# Patient Record
Sex: FEMALE | Race: WHITE | Employment: OTHER | URBAN - METROPOLITAN AREA
[De-identification: names, ages, dates, MRNs, and addresses within clinical notes are randomized per-mention and may not be internally consistent; named-entity substitution may affect disease eponyms.]

---

## 2017-11-28 PROBLEM — M25.561 CHRONIC PAIN OF BOTH KNEES: Status: ACTIVE | Noted: 2017-11-28

## 2017-11-28 PROBLEM — Z00.00 PHYSICAL EXAM: Status: ACTIVE | Noted: 2017-11-28

## 2017-11-28 PROBLEM — G89.29 CHRONIC PAIN OF BOTH KNEES: Status: ACTIVE | Noted: 2017-11-28

## 2017-11-28 PROBLEM — R53.83 FATIGUE: Status: ACTIVE | Noted: 2017-11-28

## 2017-11-28 PROBLEM — M25.562 CHRONIC PAIN OF BOTH KNEES: Status: ACTIVE | Noted: 2017-11-28

## 2017-11-28 PROBLEM — E78.2 MIXED HYPERLIPIDEMIA: Status: ACTIVE | Noted: 2017-11-28

## 2017-12-12 PROBLEM — E55.9 VITAMIN D DEFICIENCY: Status: ACTIVE | Noted: 2017-12-12

## 2017-12-12 PROBLEM — N39.0 URINARY TRACT INFECTION WITH HEMATURIA: Status: ACTIVE | Noted: 2017-12-12

## 2017-12-12 PROBLEM — R31.9 URINARY TRACT INFECTION WITH HEMATURIA: Status: ACTIVE | Noted: 2017-12-12

## 2018-02-13 PROBLEM — Z12.39 SCREENING FOR BREAST CANCER: Status: ACTIVE | Noted: 2018-02-13

## 2018-03-22 ENCOUNTER — HOSPITAL ENCOUNTER (OUTPATIENT)
Dept: MAMMOGRAPHY | Age: 58
Discharge: HOME | End: 2018-03-24
Payer: MEDICAID

## 2018-03-22 DIAGNOSIS — Z12.39 SCREENING FOR BREAST CANCER: ICD-10-CM

## 2018-04-11 ENCOUNTER — OFFICE VISIT (OUTPATIENT)
Dept: ORTHOPEDIC SURGERY | Age: 58
End: 2018-04-11
Payer: MEDICAID

## 2018-04-11 VITALS — WEIGHT: 290 LBS | BODY MASS INDEX: 51.38 KG/M2 | HEIGHT: 63 IN

## 2018-04-11 DIAGNOSIS — M17.0 BILATERAL PRIMARY OSTEOARTHRITIS OF KNEE: Primary | ICD-10-CM

## 2018-04-11 DIAGNOSIS — E66.01 MORBID OBESITY (HCC): ICD-10-CM

## 2018-04-11 PROBLEM — Z12.39 SCREENING FOR BREAST CANCER: Status: RESOLVED | Noted: 2018-02-13 | Resolved: 2018-04-11

## 2018-04-11 PROCEDURE — 3017F COLORECTAL CA SCREEN DOC REV: CPT | Performed by: ORTHOPAEDIC SURGERY

## 2018-04-11 PROCEDURE — 20610 DRAIN/INJ JOINT/BURSA W/O US: CPT | Performed by: ORTHOPAEDIC SURGERY

## 2018-04-11 PROCEDURE — 1036F TOBACCO NON-USER: CPT | Performed by: ORTHOPAEDIC SURGERY

## 2018-04-11 PROCEDURE — G8417 CALC BMI ABV UP PARAM F/U: HCPCS | Performed by: ORTHOPAEDIC SURGERY

## 2018-04-11 PROCEDURE — 99214 OFFICE O/P EST MOD 30 MIN: CPT | Performed by: ORTHOPAEDIC SURGERY

## 2018-04-11 PROCEDURE — 3014F SCREEN MAMMO DOC REV: CPT | Performed by: ORTHOPAEDIC SURGERY

## 2018-04-11 PROCEDURE — G8427 DOCREV CUR MEDS BY ELIG CLIN: HCPCS | Performed by: ORTHOPAEDIC SURGERY

## 2018-04-11 RX ORDER — TRIAMCINOLONE ACETONIDE 40 MG/ML
40 INJECTION, SUSPENSION INTRA-ARTICULAR; INTRAMUSCULAR ONCE
Status: COMPLETED | OUTPATIENT
Start: 2018-04-11 | End: 2018-04-11

## 2018-04-11 RX ADMIN — TRIAMCINOLONE ACETONIDE 40 MG: 40 INJECTION, SUSPENSION INTRA-ARTICULAR; INTRAMUSCULAR at 15:45

## 2018-04-12 PROBLEM — Z00.00 PHYSICAL EXAM: Status: RESOLVED | Noted: 2017-11-28 | Resolved: 2018-04-12

## 2018-06-14 ENCOUNTER — TELEPHONE (OUTPATIENT)
Dept: ADMINISTRATIVE | Age: 58
End: 2018-06-14

## 2018-08-07 ENCOUNTER — TELEPHONE (OUTPATIENT)
Dept: ADMINISTRATIVE | Age: 58
End: 2018-08-07

## 2018-08-07 NOTE — TELEPHONE ENCOUNTER
Patient called in and is inquiring about wether or not her gel injections are approved. Hasn't heard and was last seen in April. She states he and  did discuss them. Please call her at [phone]. Thanks.

## 2018-08-09 ENCOUNTER — TELEPHONE (OUTPATIENT)
Dept: ORTHOPEDIC SURGERY | Age: 58
End: 2018-08-09

## 2018-08-16 ENCOUNTER — TELEPHONE (OUTPATIENT)
Dept: ORTHOPEDIC SURGERY | Age: 58
End: 2018-08-16

## 2018-08-16 NOTE — TELEPHONE ENCOUNTER
Left a message on voicemail in regards to Bilateral knee Euflexxa is approved and to call the office to schedule an appointment with

## 2018-09-06 ENCOUNTER — OFFICE VISIT (OUTPATIENT)
Dept: ORTHOPEDIC SURGERY | Age: 58
End: 2018-09-06
Payer: MEDICAID

## 2018-09-06 DIAGNOSIS — M17.0 BILATERAL PRIMARY OSTEOARTHRITIS OF KNEE: Primary | ICD-10-CM

## 2018-09-06 PROCEDURE — 20610 DRAIN/INJ JOINT/BURSA W/O US: CPT | Performed by: ORTHOPAEDIC SURGERY

## 2018-09-06 RX ORDER — HYALURONATE SODIUM 10 MG/ML
20 SYRINGE (ML) INTRAARTICULAR ONCE
Status: COMPLETED | OUTPATIENT
Start: 2018-09-06 | End: 2018-09-06

## 2018-09-06 RX ADMIN — Medication 20 MG: at 16:40

## 2018-09-14 ENCOUNTER — OFFICE VISIT (OUTPATIENT)
Dept: ORTHOPEDIC SURGERY | Age: 58
End: 2018-09-14
Payer: MEDICAID

## 2018-09-14 DIAGNOSIS — M17.0 BILATERAL PRIMARY OSTEOARTHRITIS OF KNEE: Primary | ICD-10-CM

## 2018-09-14 PROCEDURE — 20610 DRAIN/INJ JOINT/BURSA W/O US: CPT | Performed by: ORTHOPAEDIC SURGERY

## 2018-09-14 RX ORDER — HYALURONATE SODIUM 10 MG/ML
20 SYRINGE (ML) INTRAARTICULAR ONCE
Status: COMPLETED | OUTPATIENT
Start: 2018-09-14 | End: 2018-09-14

## 2018-09-14 RX ADMIN — Medication 20 MG: at 08:47

## 2018-10-12 DIAGNOSIS — E78.2 MIXED HYPERLIPIDEMIA: ICD-10-CM

## 2018-10-12 DIAGNOSIS — I10 ESSENTIAL HYPERTENSION: Primary | ICD-10-CM

## 2018-10-15 ENCOUNTER — HOSPITAL ENCOUNTER (OUTPATIENT)
Age: 58
Discharge: HOME | End: 2018-10-15
Payer: MEDICAID

## 2018-10-15 LAB
ALBUMIN SERPL-MCNC: 4.2 G/DL (ref 3.5–5.2)
ALP BLD-CCNC: 79 U/L (ref 35–104)
ALT SERPL-CCNC: 48 U/L (ref 0–32)
ANION GAP SERPL CALCULATED.3IONS-SCNC: 14 MMOL/L (ref 7–16)
ANISOCYTOSIS: ABNORMAL
AST SERPL-CCNC: 49 U/L (ref 0–31)
BASOPHILS ABSOLUTE: 0.15 E9/L (ref 0–0.2)
BASOPHILS RELATIVE PERCENT: 1.7 % (ref 0–2)
BILIRUB SERPL-MCNC: 0.4 MG/DL (ref 0–1.2)
BUN BLDV-MCNC: 20 MG/DL (ref 6–20)
CALCIUM SERPL-MCNC: 9.8 MG/DL (ref 8.6–10.2)
CHLORIDE BLD-SCNC: 103 MMOL/L (ref 98–107)
CHOLESTEROL, FASTING: 192 MG/DL (ref 0–199)
CO2: 24 MMOL/L (ref 22–29)
CREAT SERPL-MCNC: 0.8 MG/DL (ref 0.5–1)
EOSINOPHILS ABSOLUTE: 0.23 E9/L (ref 0.05–0.5)
EOSINOPHILS RELATIVE PERCENT: 2.6 % (ref 0–6)
GFR AFRICAN AMERICAN: >60
GFR NON-AFRICAN AMERICAN: >60 ML/MIN/1.73
GLUCOSE FASTING: 96 MG/DL (ref 74–109)
HBA1C MFR BLD: 5.5 % (ref 4–5.6)
HCT VFR BLD CALC: 41 % (ref 34–48)
HDLC SERPL-MCNC: 37 MG/DL
HEMOGLOBIN: 12.8 G/DL (ref 11.5–15.5)
LDL CHOLESTEROL CALCULATED: 106 MG/DL (ref 0–99)
LYMPHOCYTES ABSOLUTE: 2.07 E9/L (ref 1.5–4)
LYMPHOCYTES RELATIVE PERCENT: 22.6 % (ref 20–42)
MCH RBC QN AUTO: 27.2 PG (ref 26–35)
MCHC RBC AUTO-ENTMCNC: 31.2 % (ref 32–34.5)
MCV RBC AUTO: 87 FL (ref 80–99.9)
MONOCYTES ABSOLUTE: 0.45 E9/L (ref 0.1–0.95)
MONOCYTES RELATIVE PERCENT: 5.2 % (ref 2–12)
NEUTROPHILS ABSOLUTE: 6.12 E9/L (ref 1.8–7.3)
NEUTROPHILS RELATIVE PERCENT: 67.8 % (ref 43–80)
PDW BLD-RTO: 14.6 FL (ref 11.5–15)
PLATELET # BLD: 164 E9/L (ref 130–450)
PMV BLD AUTO: 11.4 FL (ref 7–12)
POLYCHROMASIA: ABNORMAL
POTASSIUM SERPL-SCNC: 4 MMOL/L (ref 3.5–5)
RBC # BLD: 4.71 E12/L (ref 3.5–5.5)
SODIUM BLD-SCNC: 141 MMOL/L (ref 132–146)
TOTAL PROTEIN: 7.7 G/DL (ref 6.4–8.3)
TRIGLYCERIDE, FASTING: 245 MG/DL (ref 0–149)
TSH SERPL DL<=0.05 MIU/L-ACNC: 3.52 UIU/ML (ref 0.27–4.2)
VLDLC SERPL CALC-MCNC: 49 MG/DL
WBC # BLD: 9 E9/L (ref 4.5–11.5)

## 2018-10-15 PROCEDURE — 36415 COLL VENOUS BLD VENIPUNCTURE: CPT

## 2018-10-15 PROCEDURE — 84443 ASSAY THYROID STIM HORMONE: CPT

## 2018-10-15 PROCEDURE — 83036 HEMOGLOBIN GLYCOSYLATED A1C: CPT

## 2018-10-15 PROCEDURE — 85025 COMPLETE CBC W/AUTO DIFF WBC: CPT

## 2018-10-15 PROCEDURE — 80061 LIPID PANEL: CPT

## 2018-10-15 PROCEDURE — 80053 COMPREHEN METABOLIC PANEL: CPT

## 2018-10-16 ENCOUNTER — HOSPITAL ENCOUNTER (OUTPATIENT)
Age: 58
Discharge: HOME | End: 2018-10-16
Payer: MEDICAID

## 2018-10-16 LAB
EKG ATRIAL RATE: 60 BPM
EKG P AXIS: 32 DEGREES
EKG P-R INTERVAL: 158 MS
EKG Q-T INTERVAL: 438 MS
EKG QRS DURATION: 96 MS
EKG QTC CALCULATION (BAZETT): 438 MS
EKG R AXIS: 29 DEGREES
EKG T AXIS: 37 DEGREES
EKG VENTRICULAR RATE: 60 BPM

## 2018-10-16 PROCEDURE — 93005 ELECTROCARDIOGRAM TRACING: CPT

## 2018-10-17 ENCOUNTER — OFFICE VISIT (OUTPATIENT)
Dept: FAMILY MEDICINE CLINIC | Age: 58
End: 2018-10-17
Payer: MEDICAID

## 2018-10-17 VITALS
WEIGHT: 290 LBS | DIASTOLIC BLOOD PRESSURE: 74 MMHG | HEART RATE: 76 BPM | OXYGEN SATURATION: 98 % | HEIGHT: 63 IN | SYSTOLIC BLOOD PRESSURE: 132 MMHG | BODY MASS INDEX: 51.38 KG/M2

## 2018-10-17 DIAGNOSIS — M17.11 OSTEOARTHRITIS OF RIGHT KNEE, UNSPECIFIED OSTEOARTHRITIS TYPE: ICD-10-CM

## 2018-10-17 DIAGNOSIS — F32.A DEPRESSION, UNSPECIFIED DEPRESSION TYPE: ICD-10-CM

## 2018-10-17 DIAGNOSIS — E78.2 MIXED HYPERLIPIDEMIA: ICD-10-CM

## 2018-10-17 DIAGNOSIS — M25.562 CHRONIC PAIN OF BOTH KNEES: ICD-10-CM

## 2018-10-17 DIAGNOSIS — G89.29 CHRONIC PAIN OF BOTH KNEES: ICD-10-CM

## 2018-10-17 DIAGNOSIS — Z23 IMMUNIZATION DUE: ICD-10-CM

## 2018-10-17 DIAGNOSIS — I10 ESSENTIAL HYPERTENSION: ICD-10-CM

## 2018-10-17 DIAGNOSIS — Z01.818 PRE-OP EXAM: Primary | ICD-10-CM

## 2018-10-17 DIAGNOSIS — M25.561 CHRONIC PAIN OF BOTH KNEES: ICD-10-CM

## 2018-10-17 PROCEDURE — 3017F COLORECTAL CA SCREEN DOC REV: CPT | Performed by: FAMILY MEDICINE

## 2018-10-17 PROCEDURE — 99214 OFFICE O/P EST MOD 30 MIN: CPT | Performed by: FAMILY MEDICINE

## 2018-10-17 PROCEDURE — 1036F TOBACCO NON-USER: CPT | Performed by: FAMILY MEDICINE

## 2018-10-17 PROCEDURE — G8417 CALC BMI ABV UP PARAM F/U: HCPCS | Performed by: FAMILY MEDICINE

## 2018-10-17 PROCEDURE — G8482 FLU IMMUNIZE ORDER/ADMIN: HCPCS | Performed by: FAMILY MEDICINE

## 2018-10-17 PROCEDURE — G8427 DOCREV CUR MEDS BY ELIG CLIN: HCPCS | Performed by: FAMILY MEDICINE

## 2018-10-17 RX ORDER — ALLOPURINOL 100 MG/1
200 TABLET ORAL
COMMUNITY
Start: 2018-06-19 | End: 2021-02-12 | Stop reason: ALTCHOICE

## 2018-10-17 RX ORDER — FERROUS SULFATE 325(65) MG
TABLET ORAL
COMMUNITY
Start: 2018-08-27 | End: 2019-05-01 | Stop reason: CLARIF

## 2018-10-17 NOTE — PATIENT INSTRUCTIONS
for heart disease, heart attack, and stroke. HDL is the \"good\" cholesterol. High HDL is linked with a lower risk for heart disease, heart attack, and stroke. Your cholesterol levels help your doctor find out your risk for having a heart attack or stroke. How can you prevent high cholesterol? A heart-healthy lifestyle can help you prevent high cholesterol. This lifestyle helps lower your risk for a heart attack and stroke. · Eat heart-healthy foods. ¨ Eat fruits, vegetables, whole grains (like oatmeal), dried beans and peas, nuts and seeds, soy products (like tofu), and fat-free or low-fat dairy products. ¨ Replace butter, margarine, and hydrogenated or partially hydrogenated oils with olive and canola oils. (Canola oil margarine without trans fat is fine.)  ¨ Replace red meat with fish, poultry, and soy protein (like tofu). ¨ Limit processed and packaged foods like chips, crackers, and cookies. · Be active. Exercise can improve your cholesterol level. Get at least 30 minutes of exercise on most days of the week. Walking is a good choice. You also may want to do other activities, such as running, swimming, cycling, or playing tennis or team sports. · Stay at a healthy weight. Lose weight if you need to. · Don't smoke. If you need help quitting, talk to your doctor about stop-smoking programs and medicines. These can increase your chances of quitting for good. How is high cholesterol treated? The goal of treatment is to reduce your chances of having a heart attack or stroke. The goal is not to lower your cholesterol numbers only. · You may make lifestyle changes, such as eating healthy foods, not smoking, losing weight, and being more active. · You may have to take medicine. Follow-up care is a key part of your treatment and safety. Be sure to make and go to all appointments, and call your doctor if you are having problems.  It's also a good idea to know your test results and keep a list of the medicines

## 2018-10-23 ASSESSMENT — ENCOUNTER SYMPTOMS
STRIDOR: 0
HEARTBURN: 0
EYE PAIN: 0
BLOOD IN STOOL: 0
SHORTNESS OF BREATH: 0
SINUS PAIN: 0
EYE DISCHARGE: 0
ABDOMINAL PAIN: 0
DIARRHEA: 0
BACK PAIN: 0
VOMITING: 0
WHEEZING: 0
SPUTUM PRODUCTION: 0
BLURRED VISION: 0
GASTROINTESTINAL NEGATIVE: 1
ORTHOPNEA: 0
HEMOPTYSIS: 0
NAUSEA: 0
EYES NEGATIVE: 1
PHOTOPHOBIA: 0
EYE REDNESS: 0
SORE THROAT: 0
CONSTIPATION: 0
COUGH: 0
DOUBLE VISION: 0
RESPIRATORY NEGATIVE: 1

## 2018-10-23 NOTE — PROGRESS NOTES
file as of 10/17/2018.         Return for after specialist.        Reviewed recent labs related to Sharla's current problems      Discussed importance of regular Health Maintenance follow up  Health Maintenance   Topic    Hepatitis C screen     HIV screen     Cervical cancer screen     Breast cancer screen     Shingles Vaccine (1 of 2 - 2 Dose Series)    Colon cancer screen colonoscopy     Flu vaccine (1)    Potassium monitoring     Creatinine monitoring     Diabetes screen     Lipid screen     DTaP/Tdap/Td vaccine (2 - Td)

## 2018-10-24 PROCEDURE — 90471 IMMUNIZATION ADMIN: CPT | Performed by: FAMILY MEDICINE

## 2018-10-24 PROCEDURE — 90686 IIV4 VACC NO PRSV 0.5 ML IM: CPT | Performed by: FAMILY MEDICINE

## 2018-11-19 DIAGNOSIS — I10 ESSENTIAL HYPERTENSION: ICD-10-CM

## 2018-11-19 RX ORDER — LISINOPRIL 40 MG/1
40 TABLET ORAL DAILY
Qty: 90 TABLET | Refills: 1 | Status: SHIPPED | OUTPATIENT
Start: 2018-11-19 | End: 2019-06-05 | Stop reason: SDUPTHER

## 2018-11-19 RX ORDER — METOPROLOL SUCCINATE 100 MG/1
100 TABLET, EXTENDED RELEASE ORAL DAILY
Qty: 90 TABLET | Refills: 1 | Status: SHIPPED | OUTPATIENT
Start: 2018-11-19 | End: 2019-06-05 | Stop reason: SDUPTHER

## 2019-02-11 DIAGNOSIS — F32.A DEPRESSION, UNSPECIFIED DEPRESSION TYPE: ICD-10-CM

## 2019-02-11 DIAGNOSIS — I10 ESSENTIAL HYPERTENSION: ICD-10-CM

## 2019-02-11 RX ORDER — CITALOPRAM 20 MG/1
20 TABLET ORAL DAILY
Qty: 30 TABLET | Refills: 1 | Status: SHIPPED | OUTPATIENT
Start: 2019-02-11 | End: 2019-02-11 | Stop reason: SDUPTHER

## 2019-02-11 RX ORDER — HYDROCHLOROTHIAZIDE 25 MG/1
25 TABLET ORAL DAILY
Qty: 30 TABLET | Refills: 1 | Status: SHIPPED | OUTPATIENT
Start: 2019-02-11 | End: 2019-04-23 | Stop reason: SDUPTHER

## 2019-02-11 RX ORDER — HYDROCHLOROTHIAZIDE 25 MG/1
25 TABLET ORAL DAILY
Qty: 30 TABLET | Refills: 1 | Status: SHIPPED | OUTPATIENT
Start: 2019-02-11 | End: 2019-02-11 | Stop reason: SDUPTHER

## 2019-02-11 RX ORDER — CITALOPRAM 20 MG/1
20 TABLET ORAL DAILY
Qty: 30 TABLET | Refills: 1 | Status: SHIPPED | OUTPATIENT
Start: 2019-02-11 | End: 2019-04-23 | Stop reason: SDUPTHER

## 2019-05-01 ENCOUNTER — OFFICE VISIT (OUTPATIENT)
Dept: FAMILY MEDICINE CLINIC | Age: 59
End: 2019-05-01
Payer: MEDICAID

## 2019-05-01 VITALS
HEIGHT: 63 IN | TEMPERATURE: 97.5 F | DIASTOLIC BLOOD PRESSURE: 78 MMHG | RESPIRATION RATE: 18 BRPM | SYSTOLIC BLOOD PRESSURE: 136 MMHG | BODY MASS INDEX: 48.73 KG/M2 | WEIGHT: 275 LBS | OXYGEN SATURATION: 94 % | HEART RATE: 61 BPM

## 2019-05-01 DIAGNOSIS — M17.11 OSTEOARTHRITIS OF RIGHT KNEE, UNSPECIFIED OSTEOARTHRITIS TYPE: ICD-10-CM

## 2019-05-01 DIAGNOSIS — M25.561 CHRONIC PAIN OF BOTH KNEES: ICD-10-CM

## 2019-05-01 DIAGNOSIS — M25.562 CHRONIC PAIN OF BOTH KNEES: ICD-10-CM

## 2019-05-01 DIAGNOSIS — G89.29 CHRONIC PAIN OF BOTH KNEES: ICD-10-CM

## 2019-05-01 DIAGNOSIS — Z01.818 PRE-OP EXAM: Primary | ICD-10-CM

## 2019-05-01 DIAGNOSIS — I10 ESSENTIAL HYPERTENSION: ICD-10-CM

## 2019-05-01 DIAGNOSIS — Z12.11 SCREENING FOR COLON CANCER: ICD-10-CM

## 2019-05-01 DIAGNOSIS — Z12.39 SCREENING FOR BREAST CANCER: ICD-10-CM

## 2019-05-01 PROCEDURE — 3017F COLORECTAL CA SCREEN DOC REV: CPT | Performed by: FAMILY MEDICINE

## 2019-05-01 PROCEDURE — G8427 DOCREV CUR MEDS BY ELIG CLIN: HCPCS | Performed by: FAMILY MEDICINE

## 2019-05-01 PROCEDURE — G8417 CALC BMI ABV UP PARAM F/U: HCPCS | Performed by: FAMILY MEDICINE

## 2019-05-01 PROCEDURE — 1036F TOBACCO NON-USER: CPT | Performed by: FAMILY MEDICINE

## 2019-05-01 PROCEDURE — 99214 OFFICE O/P EST MOD 30 MIN: CPT | Performed by: FAMILY MEDICINE

## 2019-05-01 RX ORDER — IBUPROFEN 800 MG/1
TABLET ORAL
Refills: 0 | COMMUNITY
Start: 2019-03-21 | End: 2020-01-15 | Stop reason: SDUPTHER

## 2019-05-02 ASSESSMENT — ENCOUNTER SYMPTOMS
BACK PAIN: 0
CONSTIPATION: 0
DIARRHEA: 0
ORTHOPNEA: 0
SORE THROAT: 0
EYE DISCHARGE: 0
SPUTUM PRODUCTION: 0
ABDOMINAL PAIN: 0
DOUBLE VISION: 0
BLOOD IN STOOL: 0
EYE REDNESS: 0
EYES NEGATIVE: 1
PHOTOPHOBIA: 0
EYE PAIN: 0
HEARTBURN: 0
HEMOPTYSIS: 0
WHEEZING: 0
RESPIRATORY NEGATIVE: 1
COUGH: 0
STRIDOR: 0
VOMITING: 0
SINUS PAIN: 0
GASTROINTESTINAL NEGATIVE: 1
BLURRED VISION: 0
NAUSEA: 0
SHORTNESS OF BREATH: 0

## 2019-05-03 NOTE — PROGRESS NOTES
Mckenzie Hamilton is a 59 y.o. female. HPI/Chief C/O:  Chief Complaint   Patient presents with    Pre-op Exam     Pt here today for pre-op exam for knee replacement 5/14     No Known Allergies  This 59year old female presents for pre-op. Pt has chronic pain pamela. Knees, to have total knee replacement right knee on 5/14/2019 by Dr. Kumar Giron. Pt has hypertension, and hyperlipidemia. Pt denies SI and HI.  ROS:  Review of Systems   Constitutional: Positive for malaise/fatigue. Negative for chills, diaphoresis, fever and weight loss. HENT: Negative. Negative for congestion, ear discharge, ear pain, hearing loss, nosebleeds, sinus pain, sore throat and tinnitus. Eyes: Negative. Negative for blurred vision, double vision, photophobia, pain, discharge and redness. Respiratory: Negative. Negative for cough, hemoptysis, sputum production, shortness of breath, wheezing and stridor. Cardiovascular: Negative for chest pain, palpitations, orthopnea, claudication, leg swelling and PND. Pt has hypertension, and hyperlipidemia. Gastrointestinal: Negative. Negative for abdominal pain, blood in stool, constipation, diarrhea, heartburn, melena, nausea and vomiting. Genitourinary: Negative. Negative for dysuria, flank pain, frequency, hematuria and urgency. Musculoskeletal: Positive for joint pain and myalgias. Negative for back pain, falls and neck pain. Skin: Negative. Negative for itching and rash. Neurological: Positive for tingling and focal weakness. Negative for dizziness, tremors, sensory change, speech change, seizures, loss of consciousness, weakness and headaches. Endo/Heme/Allergies: Negative. Negative for environmental allergies and polydipsia. Does not bruise/bleed easily. Psychiatric/Behavioral: Positive for depression. Negative for hallucinations, memory loss, substance abuse and suicidal ideas. The patient is nervous/anxious. The patient does not have insomnia. Kristopher Gordillo is a 59 y.o. female. HPI/Chief C/O:  Chief Complaint   Patient presents with    Pre-op Exam     Pt here today for pre-op exam for knee replacement 5/14     No Known Allergies  This 59year old female presents for pre-op. Pt has chronic pain pamela. Knees, to have total knee replacement right knee on 5/14/2019 by Dr. Gretchen Parham. Pt has hypertension, and hyperlipidemia. Pt denies SI and HI.  ROS:  Review of Systems   Constitutional: Positive for malaise/fatigue. Negative for chills, diaphoresis, fever and weight loss. HENT: Negative. Negative for congestion, ear discharge, ear pain, hearing loss, nosebleeds, sinus pain, sore throat and tinnitus. Eyes: Negative. Negative for blurred vision, double vision, photophobia, pain, discharge and redness. Respiratory: Negative. Negative for cough, hemoptysis, sputum production, shortness of breath, wheezing and stridor. Cardiovascular: Negative for chest pain, palpitations, orthopnea, claudication, leg swelling and PND. Pt has hypertension, and hyperlipidemia. Gastrointestinal: Negative. Negative for abdominal pain, blood in stool, constipation, diarrhea, heartburn, melena, nausea and vomiting. Genitourinary: Negative. Negative for dysuria, flank pain, frequency, hematuria and urgency. Musculoskeletal: Positive for joint pain and myalgias. Negative for back pain, falls and neck pain. Skin: Negative. Negative for itching and rash. Neurological: Positive for tingling and focal weakness. Negative for dizziness, tremors, sensory change, speech change, seizures, loss of consciousness, weakness and headaches. Endo/Heme/Allergies: Negative. Negative for environmental allergies and polydipsia. Does not bruise/bleed easily. Psychiatric/Behavioral: Positive for depression. Negative for hallucinations, memory loss, substance abuse and suicidal ideas. The patient is not nervous/anxious and does not have insomnia. Past Medical/Surgical Hx;  Reviewed with patient      Diagnosis Date    Hypertension      Past Surgical History:   Procedure Laterality Date    HYSTERECTOMY         Past Family Hx:  Reviewed with patient      Family history unknown: Yes       Social Hx:  Reviewed with patient  Social History     Tobacco Use    Smoking status: Never Smoker    Smokeless tobacco: Never Used   Substance Use Topics    Alcohol use: Yes       OBJECTIVE  /78   Pulse 61   Temp 97.5 °F (36.4 °C)   Resp 18   Ht 5' 2.99\" (1.6 m)   Wt 275 lb (124.7 kg)   SpO2 94%   Breastfeeding? No   BMI 48.73 kg/m²     Problem List:  Sharla does not have any pertinent problems on file. PHYS EX:  Physical Exam   Constitutional: She is oriented to person, place, and time. She appears well-developed and well-nourished. No distress. HENT:   Head: Normocephalic and atraumatic. Right Ear: External ear normal.   Left Ear: External ear normal.   Nose: Nose normal.   Mouth/Throat: Oropharynx is clear and moist. No oropharyngeal exudate. Eyes: Pupils are equal, round, and reactive to light. Conjunctivae and EOM are normal. Right eye exhibits no discharge. Left eye exhibits no discharge. No scleral icterus. Neck: Normal range of motion. Neck supple. No JVD present. No tracheal deviation present. No thyromegaly present. Cardiovascular: Normal rate, regular rhythm, normal heart sounds and intact distal pulses. Exam reveals no gallop and no friction rub. No murmur heard. Pulmonary/Chest: Effort normal and breath sounds normal. No stridor. No respiratory distress. She has no wheezes. She has no rales. She exhibits no tenderness. Abdominal: Soft. Bowel sounds are normal. She exhibits no distension and no mass. There is no tenderness. There is no rebound and no guarding. No hernia. Musculoskeletal: She exhibits tenderness. She exhibits no edema or deformity. Pain and decreased ROM pamela. Knees.    Lymphadenopathy:     She has no cervical adenopathy. Neurological: She is alert and oriented to person, place, and time. She has normal reflexes. She displays normal reflexes. No cranial nerve deficit. She exhibits normal muscle tone. Coordination normal.   Skin: Skin is warm. No rash noted. She is not diaphoretic. No erythema. No pallor. Psychiatric: She has a normal mood and affect. Her behavior is normal. Judgment and thought content normal.   Nursing note and vitals reviewed. ASSESSMENT/PLAN  Sharla was seen today for knee pain. Diagnoses and all orders for this visit:    Pre-op exam  Pre-op exam. Lab, EKG. Chronic pain of both knees  Not controlled. Pt states specialist will go over medication. Mixed hyperlipidemia  Not controlled. Low chol. Diet. Osteoarthritis of right knee, unspecified osteoarthritis type  Not controlled. Specialist.  Essential hypertension  Controlled. HCTZ, ace, BB, low salt diet. Depression. Controlled. Celexa. Pt instructed if any worse go ED ASAP. Outpatient Encounter Medications as of 5/1/2019   Medication Sig Dispense Refill    citalopram (CELEXA) 20 MG tablet Take 1 tablet by mouth daily 90 tablet 0    hydrochlorothiazide (HYDRODIURIL) 25 MG tablet Take 1 tablet by mouth daily 90 tablet 0    lisinopril (PRINIVIL;ZESTRIL) 40 MG tablet Take 1 tablet by mouth daily 90 tablet 1    metoprolol succinate (TOPROL XL) 100 MG extended release tablet Take 1 tablet by mouth daily 90 tablet 1    allopurinol (ZYLOPRIM) 100 MG tablet Take 100 mg by mouth      vitamin D (ERGOCALCIFEROL) 87213 units CAPS capsule Take 1 capsule by mouth once a week 4 capsule 3    ibuprofen (ADVIL;MOTRIN) 800 MG tablet take 1 tablet by mouth three times a day with food  0    [DISCONTINUED] meloxicam (MOBIC) 15 MG tablet take 1 tablet by mouth once daily with food 30 tablet 2    [DISCONTINUED] ferrous sulfate 325 (65 Fe) MG tablet        No facility-administered encounter medications on file as of 5/1/2019. Return for recheck after surgery. Reviewed recent labs related to Shrala's current problems      Discussed importance of regular Health Maintenance follow up  Health Maintenance   Topic    Hepatitis C screen     HIV screen     Cervical cancer screen     Breast cancer screen     Shingles Vaccine (1 of 2)    Colon cancer screen colonoscopy     Potassium monitoring     Creatinine monitoring     Diabetes screen     Lipid screen     DTaP/Tdap/Td vaccine (2 - Td)    Flu vaccine     Pneumococcal 0-64 years Vaccine                                              SUBJECTIVE  Sharla Mir is a 59 y.o. female. HPI/Chief C/O:  Chief Complaint   Patient presents with    Pre-op Exam     Pt here today for pre-op exam for knee replacement 5/14     No Known Allergies  This 59year old female presents for pre-op. Pt has chronic pain pamela. Knees, to have total knee replacement right knee on 5/14/2019 by Dr, Sheryle Poke. Pt has hypertension, and hyperlipidemia. Pt denies SI and HI.  ROS:  Review of Systems   Constitutional: Positive for malaise/fatigue. Negative for chills, diaphoresis, fever and weight loss. HENT: Negative. Negative for congestion, ear discharge, ear pain, hearing loss, nosebleeds, sinus pain, sore throat and tinnitus. Eyes: Negative. Negative for blurred vision, double vision, photophobia, pain, discharge and redness. Respiratory: Negative. Negative for cough, hemoptysis, sputum production, shortness of breath, wheezing and stridor. Cardiovascular: Negative for chest pain, palpitations, orthopnea, claudication, leg swelling and PND. Pt has hypertension, and hyperlipidemia. Gastrointestinal: Negative. Negative for abdominal pain, blood in stool, constipation, diarrhea, heartburn, melena, nausea and vomiting. Genitourinary: Negative. Negative for dysuria, flank pain, frequency, hematuria and urgency. Musculoskeletal: Positive for joint pain and myalgias.  Negative for release tablet Take 1 tablet by mouth daily 90 tablet 1    allopurinol (ZYLOPRIM) 100 MG tablet Take 100 mg by mouth      vitamin D (ERGOCALCIFEROL) 04819 units CAPS capsule Take 1 capsule by mouth once a week 4 capsule 3    ibuprofen (ADVIL;MOTRIN) 800 MG tablet take 1 tablet by mouth three times a day with food  0    [DISCONTINUED] meloxicam (MOBIC) 15 MG tablet take 1 tablet by mouth once daily with food 30 tablet 2    [DISCONTINUED] ferrous sulfate 325 (65 Fe) MG tablet        No facility-administered encounter medications on file as of 5/1/2019. Return for recheck after surgery. Reviewed recent labs related to Sharla's current problems      Discussed importance of regular Health Maintenance follow up  Health Maintenance   Topic    Hepatitis C screen     HIV screen     Cervical cancer screen     Breast cancer screen     Shingles Vaccine (1 of 2)    Colon cancer screen colonoscopy     Potassium monitoring     Creatinine monitoring     Diabetes screen     Lipid screen     DTaP/Tdap/Td vaccine (2 - Td)    Flu vaccine     Pneumococcal 0-64 years Vaccine                                                    Past Medical/Surgical Hx;  Reviewed with patient      Diagnosis Date    Hypertension      Past Surgical History:   Procedure Laterality Date    HYSTERECTOMY         Past Family Hx:  Reviewed with patient      Family history unknown: Yes       Social Hx:  Reviewed with patient  Social History     Tobacco Use    Smoking status: Never Smoker    Smokeless tobacco: Never Used   Substance Use Topics    Alcohol use: Yes       OBJECTIVE  /78   Pulse 61   Temp 97.5 °F (36.4 °C)   Resp 18   Ht 5' 2.99\" (1.6 m)   Wt 275 lb (124.7 kg)   SpO2 94%   Breastfeeding? No   BMI 48.73 kg/m²     Problem List:  Sharla does not have any pertinent problems on file. PHYS EX:  Physical Exam   Constitutional: She is oriented to person, place, and time.  She appears well-developed and well-nourished. No distress. HENT:   Head: Normocephalic and atraumatic. Right Ear: External ear normal.   Left Ear: External ear normal.   Nose: Nose normal.   Mouth/Throat: Oropharynx is clear and moist. No oropharyngeal exudate. Eyes: Pupils are equal, round, and reactive to light. Conjunctivae and EOM are normal. Right eye exhibits no discharge. Left eye exhibits no discharge. No scleral icterus. Neck: Normal range of motion. Neck supple. No JVD present. No tracheal deviation present. No thyromegaly present. Cardiovascular: Normal rate, regular rhythm, normal heart sounds and intact distal pulses. Exam reveals no gallop and no friction rub. No murmur heard. Pulmonary/Chest: Effort normal and breath sounds normal. No stridor. No respiratory distress. She has no wheezes. She has no rales. She exhibits no tenderness. Abdominal: Soft. Bowel sounds are normal. She exhibits no distension and no mass. There is no tenderness. There is no rebound and no guarding. No hernia. Musculoskeletal: She exhibits tenderness. She exhibits no edema or deformity. Pain and decreased ROM pamela. Knees. Pt in wheelchair. Lymphadenopathy:     She has no cervical adenopathy. Neurological: She is alert and oriented to person, place, and time. She has normal reflexes. She displays normal reflexes. No cranial nerve deficit. She exhibits normal muscle tone. Coordination normal.   Skin: Skin is warm. No rash noted. She is not diaphoretic. No erythema. No pallor. Psychiatric: She has a normal mood and affect. Her behavior is normal. Judgment and thought content normal.   Nursing note and vitals reviewed. ASSESSMENT/PLAN  Sharla was seen today for knee pain. Diagnoses and all orders for this visit:    Pre-op exam  Pre-op exam. Lab, EKG. Chronic pain of both knees  Not controlled. Pt states specialist will go over medication. Mixed hyperlipidemia  Not controlled. Low chol. Diet.   Osteoarthritis of right knee, unspecified osteoarthritis type  Not controlled. Specialist.  Essential hypertension  Controlled. HCTZ, ace, BB, low salt diet. Depression. Controlled. Celexa. Pt instructed if any worse go ED ASAP. Outpatient Encounter Medications as of 5/1/2019   Medication Sig Dispense Refill    citalopram (CELEXA) 20 MG tablet Take 1 tablet by mouth daily 90 tablet 0    hydrochlorothiazide (HYDRODIURIL) 25 MG tablet Take 1 tablet by mouth daily 90 tablet 0    lisinopril (PRINIVIL;ZESTRIL) 40 MG tablet Take 1 tablet by mouth daily 90 tablet 1    metoprolol succinate (TOPROL XL) 100 MG extended release tablet Take 1 tablet by mouth daily 90 tablet 1    allopurinol (ZYLOPRIM) 100 MG tablet Take 100 mg by mouth      vitamin D (ERGOCALCIFEROL) 26063 units CAPS capsule Take 1 capsule by mouth once a week 4 capsule 3    ibuprofen (ADVIL;MOTRIN) 800 MG tablet take 1 tablet by mouth three times a day with food  0    [DISCONTINUED] meloxicam (MOBIC) 15 MG tablet take 1 tablet by mouth once daily with food 30 tablet 2    [DISCONTINUED] ferrous sulfate 325 (65 Fe) MG tablet        No facility-administered encounter medications on file as of 5/1/2019. Return for recheck after surgery. Reviewed recent labs related to Sharla's current problems      Discussed importance of regular Health Maintenance follow up  Health Maintenance   Topic    Hepatitis C screen     HIV screen     Cervical cancer screen     Breast cancer screen     Shingles Vaccine (1 of 2)    Colon cancer screen colonoscopy     Potassium monitoring     Creatinine monitoring     Diabetes screen     Lipid screen     DTaP/Tdap/Td vaccine (2 - Td)    Flu vaccine     Pneumococcal 0-64 years Vaccine                                                Review of Systems   Constitutional: Positive for malaise/fatigue. Negative for chills, diaphoresis, fever and weight loss. HENT: Negative.   Negative for congestion, ear discharge, ear pain, hearing loss, nosebleeds, sinus pain, sore throat and tinnitus. Eyes: Negative. Negative for blurred vision, double vision, photophobia, pain, discharge and redness. Respiratory: Negative. Negative for cough, hemoptysis, sputum production, shortness of breath, wheezing and stridor. Cardiovascular: Negative for chest pain, palpitations, orthopnea, claudication, leg swelling and PND. Pt has hypertension, and hyperlipidemia. Gastrointestinal: Negative. Negative for abdominal pain, blood in stool, constipation, diarrhea, heartburn, melena, nausea and vomiting. Endocrine: Negative for polydipsia. Genitourinary: Negative. Negative for dysuria, flank pain, frequency, hematuria and urgency. Musculoskeletal: Positive for joint pain and myalgias. Negative for back pain, falls and neck pain. Skin: Negative. Negative for itching and rash. Allergic/Immunologic: Negative for environmental allergies. Neurological: Positive for tingling and focal weakness. Negative for dizziness, tremors, sensory change, speech change, seizures, loss of consciousness, weakness and headaches. Hematological: Does not bruise/bleed easily. Psychiatric/Behavioral: Positive for depression. Negative for hallucinations, memory loss, substance abuse and suicidal ideas. The patient is nervous/anxious. The patient does not have insomnia.

## 2019-06-05 DIAGNOSIS — I10 ESSENTIAL HYPERTENSION: ICD-10-CM

## 2019-06-05 RX ORDER — METOPROLOL SUCCINATE 100 MG/1
TABLET, EXTENDED RELEASE ORAL
Qty: 90 TABLET | Refills: 1 | Status: SHIPPED | OUTPATIENT
Start: 2019-06-05 | End: 2019-12-26 | Stop reason: SDUPTHER

## 2019-06-05 RX ORDER — LISINOPRIL 40 MG/1
TABLET ORAL
Qty: 90 TABLET | Refills: 1 | Status: SHIPPED | OUTPATIENT
Start: 2019-06-05 | End: 2019-12-26 | Stop reason: SDUPTHER

## 2019-07-18 DIAGNOSIS — Z12.11 SCREENING FOR COLON CANCER: ICD-10-CM

## 2019-07-18 LAB
CONTROL: NORMAL
HEMOCCULT STL QL: POSITIVE

## 2019-07-18 PROCEDURE — 82274 ASSAY TEST FOR BLOOD FECAL: CPT | Performed by: FAMILY MEDICINE

## 2019-07-19 DIAGNOSIS — R19.5 POSITIVE FIT (FECAL IMMUNOCHEMICAL TEST): Primary | ICD-10-CM

## 2019-08-02 DIAGNOSIS — K62.5 BLOOD ON RECTAL EXAM: Primary | ICD-10-CM

## 2019-10-07 ENCOUNTER — OFFICE VISIT (OUTPATIENT)
Dept: FAMILY MEDICINE CLINIC | Age: 59
End: 2019-10-07
Payer: MEDICAID

## 2019-10-07 VITALS
BODY MASS INDEX: 53.92 KG/M2 | SYSTOLIC BLOOD PRESSURE: 122 MMHG | HEIGHT: 62 IN | DIASTOLIC BLOOD PRESSURE: 74 MMHG | OXYGEN SATURATION: 96 % | TEMPERATURE: 98.6 F | HEART RATE: 61 BPM | WEIGHT: 293 LBS | RESPIRATION RATE: 18 BRPM

## 2019-10-07 DIAGNOSIS — Z23 IMMUNIZATION DUE: ICD-10-CM

## 2019-10-07 DIAGNOSIS — R53.83 FATIGUE, UNSPECIFIED TYPE: ICD-10-CM

## 2019-10-07 DIAGNOSIS — N20.0 NEPHROLITHIASIS: ICD-10-CM

## 2019-10-07 DIAGNOSIS — R73.01 IFG (IMPAIRED FASTING GLUCOSE): ICD-10-CM

## 2019-10-07 DIAGNOSIS — R73.09 ELEVATED HEMOGLOBIN A1C: ICD-10-CM

## 2019-10-07 DIAGNOSIS — F32.A DEPRESSION, UNSPECIFIED DEPRESSION TYPE: ICD-10-CM

## 2019-10-07 DIAGNOSIS — E55.9 VITAMIN D DEFICIENCY: ICD-10-CM

## 2019-10-07 DIAGNOSIS — E78.2 MIXED HYPERLIPIDEMIA: ICD-10-CM

## 2019-10-07 DIAGNOSIS — I10 ESSENTIAL HYPERTENSION: Primary | ICD-10-CM

## 2019-10-07 LAB
CHP ED QC CHECK: NORMAL
GLUCOSE BLD-MCNC: 121 MG/DL
HBA1C MFR BLD: 5.9 %

## 2019-10-07 PROCEDURE — 82962 GLUCOSE BLOOD TEST: CPT | Performed by: FAMILY MEDICINE

## 2019-10-07 PROCEDURE — 83036 HEMOGLOBIN GLYCOSYLATED A1C: CPT | Performed by: FAMILY MEDICINE

## 2019-10-07 PROCEDURE — G8417 CALC BMI ABV UP PARAM F/U: HCPCS | Performed by: FAMILY MEDICINE

## 2019-10-07 PROCEDURE — 1036F TOBACCO NON-USER: CPT | Performed by: FAMILY MEDICINE

## 2019-10-07 PROCEDURE — G8482 FLU IMMUNIZE ORDER/ADMIN: HCPCS | Performed by: FAMILY MEDICINE

## 2019-10-07 PROCEDURE — 99214 OFFICE O/P EST MOD 30 MIN: CPT | Performed by: FAMILY MEDICINE

## 2019-10-07 PROCEDURE — 90686 IIV4 VACC NO PRSV 0.5 ML IM: CPT | Performed by: FAMILY MEDICINE

## 2019-10-07 PROCEDURE — 3017F COLORECTAL CA SCREEN DOC REV: CPT | Performed by: FAMILY MEDICINE

## 2019-10-07 PROCEDURE — 90471 IMMUNIZATION ADMIN: CPT | Performed by: FAMILY MEDICINE

## 2019-10-07 PROCEDURE — G8427 DOCREV CUR MEDS BY ELIG CLIN: HCPCS | Performed by: FAMILY MEDICINE

## 2019-10-07 RX ORDER — GLUCOSAMINE HCL/CHONDROITIN SU 500-400 MG
CAPSULE ORAL
Qty: 200 STRIP | Refills: 3 | Status: SHIPPED
Start: 2019-10-07 | End: 2021-02-12 | Stop reason: ALTCHOICE

## 2019-10-07 RX ORDER — BLOOD-GLUCOSE METER
KIT MISCELLANEOUS
Qty: 1 KIT | Refills: 0 | Status: SHIPPED
Start: 2019-10-07 | End: 2021-02-12 | Stop reason: ALTCHOICE

## 2019-10-07 RX ORDER — CITALOPRAM 20 MG/1
20 TABLET ORAL DAILY
Qty: 90 TABLET | Refills: 1 | Status: SHIPPED
Start: 2019-10-07 | End: 2020-04-11 | Stop reason: SDUPTHER

## 2019-10-07 RX ORDER — HYDROCHLOROTHIAZIDE 25 MG/1
25 TABLET ORAL DAILY
Qty: 90 TABLET | Refills: 1 | Status: SHIPPED
Start: 2019-10-07 | End: 2020-04-11 | Stop reason: SDUPTHER

## 2019-10-07 RX ORDER — LANCETS 30 GAUGE
EACH MISCELLANEOUS
Qty: 200 EACH | Refills: 3 | Status: SHIPPED
Start: 2019-10-07 | End: 2021-02-12 | Stop reason: ALTCHOICE

## 2019-10-07 RX ORDER — POTASSIUM CITRATE 10 MEQ/1
1 TABLET, EXTENDED RELEASE ORAL 2 TIMES DAILY
Refills: 0 | COMMUNITY
Start: 2019-09-20 | End: 2021-02-19

## 2019-10-08 ENCOUNTER — HOSPITAL ENCOUNTER (OUTPATIENT)
Age: 59
Discharge: HOME | End: 2019-10-10
Payer: MEDICAID

## 2019-10-08 DIAGNOSIS — R73.01 IFG (IMPAIRED FASTING GLUCOSE): ICD-10-CM

## 2019-10-08 DIAGNOSIS — E55.9 VITAMIN D DEFICIENCY: ICD-10-CM

## 2019-10-08 DIAGNOSIS — I10 ESSENTIAL HYPERTENSION: ICD-10-CM

## 2019-10-08 DIAGNOSIS — Z23 IMMUNIZATION DUE: ICD-10-CM

## 2019-10-08 DIAGNOSIS — E78.2 MIXED HYPERLIPIDEMIA: ICD-10-CM

## 2019-10-08 DIAGNOSIS — R53.83 FATIGUE, UNSPECIFIED TYPE: ICD-10-CM

## 2019-10-08 DIAGNOSIS — R73.09 ELEVATED HEMOGLOBIN A1C: ICD-10-CM

## 2019-10-08 LAB
ALBUMIN SERPL-MCNC: 4.5 G/DL (ref 3.5–5.2)
ALP BLD-CCNC: 105 U/L (ref 35–104)
ALT SERPL-CCNC: 61 U/L (ref 0–32)
ANION GAP SERPL CALCULATED.3IONS-SCNC: 17 MMOL/L (ref 7–16)
AST SERPL-CCNC: 87 U/L (ref 0–31)
BASOPHILS ABSOLUTE: 0.07 E9/L (ref 0–0.2)
BASOPHILS RELATIVE PERCENT: 0.9 % (ref 0–2)
BILIRUB SERPL-MCNC: 0.5 MG/DL (ref 0–1.2)
BUN BLDV-MCNC: 23 MG/DL (ref 8–23)
BURR CELLS: ABNORMAL
CALCIUM SERPL-MCNC: 9.9 MG/DL (ref 8.6–10.2)
CHLORIDE BLD-SCNC: 106 MMOL/L (ref 98–107)
CHOLESTEROL, TOTAL: 245 MG/DL (ref 0–199)
CO2: 23 MMOL/L (ref 22–29)
CREAT SERPL-MCNC: 1 MG/DL (ref 0.5–1)
CREATININE URINE: 503 MG/DL (ref 29–226)
EOSINOPHILS ABSOLUTE: 0.21 E9/L (ref 0.05–0.5)
EOSINOPHILS RELATIVE PERCENT: 2.6 % (ref 0–6)
GFR AFRICAN AMERICAN: >60
GFR NON-AFRICAN AMERICAN: 56 ML/MIN/1.73
GLUCOSE BLD-MCNC: 123 MG/DL (ref 74–99)
HCT VFR BLD CALC: 45.9 % (ref 34–48)
HDLC SERPL-MCNC: 33 MG/DL
HEMOGLOBIN: 13 G/DL (ref 11.5–15.5)
LDL CHOLESTEROL CALCULATED: 168 MG/DL (ref 0–99)
LYMPHOCYTES ABSOLUTE: 3.03 E9/L (ref 1.5–4)
LYMPHOCYTES RELATIVE PERCENT: 36.5 % (ref 20–42)
MCH RBC QN AUTO: 28.4 PG (ref 26–35)
MCHC RBC AUTO-ENTMCNC: 28.3 % (ref 32–34.5)
MCV RBC AUTO: 100.2 FL (ref 80–99.9)
METAMYELOCYTES RELATIVE PERCENT: 0.9 % (ref 0–1)
MICROALBUMIN UR-MCNC: 41.2 MG/L
MICROALBUMIN/CREAT UR-RTO: 8.2 (ref 0–30)
MONOCYTES ABSOLUTE: 0.41 E9/L (ref 0.1–0.95)
MONOCYTES RELATIVE PERCENT: 5.2 % (ref 2–12)
MYELOCYTE PERCENT: 0.9 % (ref 0–0)
NEUTROPHILS ABSOLUTE: 4.51 E9/L (ref 1.8–7.3)
NEUTROPHILS RELATIVE PERCENT: 53 % (ref 43–80)
PDW BLD-RTO: 16.6 FL (ref 11.5–15)
PLATELET # BLD: 194 E9/L (ref 130–450)
PMV BLD AUTO: 12.1 FL (ref 7–12)
POIKILOCYTES: ABNORMAL
POLYCHROMASIA: ABNORMAL
POTASSIUM SERPL-SCNC: 4.8 MMOL/L (ref 3.5–5)
RBC # BLD: 4.58 E12/L (ref 3.5–5.5)
SODIUM BLD-SCNC: 146 MMOL/L (ref 132–146)
TOTAL PROTEIN: 8.3 G/DL (ref 6.4–8.3)
TRIGL SERPL-MCNC: 221 MG/DL (ref 0–149)
TSH SERPL DL<=0.05 MIU/L-ACNC: 7.5 UIU/ML (ref 0.27–4.2)
VITAMIN D 25-HYDROXY: 16 NG/ML (ref 30–100)
VLDLC SERPL CALC-MCNC: 44 MG/DL
WBC # BLD: 8.2 E9/L (ref 4.5–11.5)

## 2019-10-08 PROCEDURE — 80061 LIPID PANEL: CPT

## 2019-10-08 PROCEDURE — 82570 ASSAY OF URINE CREATININE: CPT

## 2019-10-08 PROCEDURE — 82306 VITAMIN D 25 HYDROXY: CPT

## 2019-10-08 PROCEDURE — 85025 COMPLETE CBC W/AUTO DIFF WBC: CPT

## 2019-10-08 PROCEDURE — 84443 ASSAY THYROID STIM HORMONE: CPT

## 2019-10-08 PROCEDURE — 82044 UR ALBUMIN SEMIQUANTITATIVE: CPT

## 2019-10-08 PROCEDURE — 80053 COMPREHEN METABOLIC PANEL: CPT

## 2019-10-10 PROBLEM — R73.09 ELEVATED HEMOGLOBIN A1C: Status: ACTIVE | Noted: 2019-10-10

## 2019-10-10 ASSESSMENT — ENCOUNTER SYMPTOMS
BACK PAIN: 0
WHEEZING: 0
ANAL BLEEDING: 0
COUGH: 0
SINUS PRESSURE: 0
ABDOMINAL DISTENTION: 0
PHOTOPHOBIA: 0
CHOKING: 0
EYES NEGATIVE: 1
EYE DISCHARGE: 0
CONSTIPATION: 0
RESPIRATORY NEGATIVE: 1
FACIAL SWELLING: 0
COLOR CHANGE: 0
EYE ITCHING: 0
SINUS PAIN: 0
SHORTNESS OF BREATH: 0
CHEST TIGHTNESS: 0
VOICE CHANGE: 0
DIARRHEA: 0
EYE REDNESS: 0
NAUSEA: 0
EYE PAIN: 0
SORE THROAT: 0
BLOOD IN STOOL: 1
ABDOMINAL PAIN: 0
VOMITING: 0
TROUBLE SWALLOWING: 0
RHINORRHEA: 0
RECTAL PAIN: 0
ALLERGIC/IMMUNOLOGIC NEGATIVE: 1
STRIDOR: 0

## 2019-11-04 ENCOUNTER — TELEPHONE (OUTPATIENT)
Dept: FAMILY MEDICINE CLINIC | Age: 59
End: 2019-11-04

## 2019-12-05 ENCOUNTER — APPOINTMENT (OUTPATIENT)
Dept: GENERAL RADIOLOGY | Age: 59
End: 2019-12-05
Payer: MEDICAID

## 2019-12-05 ENCOUNTER — HOSPITAL ENCOUNTER (OUTPATIENT)
Age: 59
Setting detail: OBSERVATION
Discharge: HOME | End: 2019-12-07
Attending: EMERGENCY MEDICINE | Admitting: INTERNAL MEDICINE
Payer: MEDICAID

## 2019-12-05 ENCOUNTER — TELEPHONE (OUTPATIENT)
Dept: ADMINISTRATIVE | Age: 59
End: 2019-12-05

## 2019-12-05 DIAGNOSIS — R07.9 CHEST PAIN, UNSPECIFIED TYPE: ICD-10-CM

## 2019-12-05 DIAGNOSIS — R00.2 PALPITATIONS: Primary | ICD-10-CM

## 2019-12-05 LAB
ANION GAP SERPL CALCULATED.3IONS-SCNC: 17 MMOL/L (ref 7–16)
BASOPHILS ABSOLUTE: 0.03 E9/L (ref 0–0.2)
BASOPHILS RELATIVE PERCENT: 0.3 % (ref 0–2)
BUN BLDV-MCNC: 21 MG/DL (ref 8–23)
CALCIUM SERPL-MCNC: 9.8 MG/DL (ref 8.6–10.2)
CHLORIDE BLD-SCNC: 99 MMOL/L (ref 98–107)
CO2: 24 MMOL/L (ref 22–29)
CREAT SERPL-MCNC: 0.9 MG/DL (ref 0.5–1)
EOSINOPHILS ABSOLUTE: 0.13 E9/L (ref 0.05–0.5)
EOSINOPHILS RELATIVE PERCENT: 1.2 % (ref 0–6)
GFR AFRICAN AMERICAN: >60
GFR NON-AFRICAN AMERICAN: >60 ML/MIN/1.73
GLUCOSE BLD-MCNC: 127 MG/DL (ref 74–99)
HCT VFR BLD CALC: 41.8 % (ref 34–48)
HEMOGLOBIN: 13.3 G/DL (ref 11.5–15.5)
IMMATURE GRANULOCYTES #: 0.06 E9/L
IMMATURE GRANULOCYTES %: 0.6 % (ref 0–5)
LYMPHOCYTES ABSOLUTE: 2.6 E9/L (ref 1.5–4)
LYMPHOCYTES RELATIVE PERCENT: 24.9 % (ref 20–42)
MAGNESIUM: 2.2 MG/DL (ref 1.6–2.6)
MCH RBC QN AUTO: 28.6 PG (ref 26–35)
MCHC RBC AUTO-ENTMCNC: 31.8 % (ref 32–34.5)
MCV RBC AUTO: 89.9 FL (ref 80–99.9)
MONOCYTES ABSOLUTE: 0.79 E9/L (ref 0.1–0.95)
MONOCYTES RELATIVE PERCENT: 7.6 % (ref 2–12)
NEUTROPHILS ABSOLUTE: 6.85 E9/L (ref 1.8–7.3)
NEUTROPHILS RELATIVE PERCENT: 65.4 % (ref 43–80)
PDW BLD-RTO: 15.1 FL (ref 11.5–15)
PLATELET # BLD: 195 E9/L (ref 130–450)
PMV BLD AUTO: 10.9 FL (ref 7–12)
POTASSIUM SERPL-SCNC: 4.1 MMOL/L (ref 3.5–5)
RBC # BLD: 4.65 E12/L (ref 3.5–5.5)
SODIUM BLD-SCNC: 140 MMOL/L (ref 132–146)
TROPONIN: <0.01 NG/ML (ref 0–0.03)
TSH SERPL DL<=0.05 MIU/L-ACNC: 5.79 UIU/ML (ref 0.27–4.2)
WBC # BLD: 10.5 E9/L (ref 4.5–11.5)

## 2019-12-05 PROCEDURE — 6370000000 HC RX 637 (ALT 250 FOR IP): Performed by: INTERNAL MEDICINE

## 2019-12-05 PROCEDURE — G0378 HOSPITAL OBSERVATION PER HR: HCPCS

## 2019-12-05 PROCEDURE — 84484 ASSAY OF TROPONIN QUANT: CPT

## 2019-12-05 PROCEDURE — 80048 BASIC METABOLIC PNL TOTAL CA: CPT

## 2019-12-05 PROCEDURE — 84443 ASSAY THYROID STIM HORMONE: CPT

## 2019-12-05 PROCEDURE — 71045 X-RAY EXAM CHEST 1 VIEW: CPT

## 2019-12-05 PROCEDURE — 36415 COLL VENOUS BLD VENIPUNCTURE: CPT

## 2019-12-05 PROCEDURE — 99285 EMERGENCY DEPT VISIT HI MDM: CPT

## 2019-12-05 PROCEDURE — 83735 ASSAY OF MAGNESIUM: CPT

## 2019-12-05 PROCEDURE — 85025 COMPLETE CBC W/AUTO DIFF WBC: CPT

## 2019-12-05 PROCEDURE — 93005 ELECTROCARDIOGRAM TRACING: CPT | Performed by: STUDENT IN AN ORGANIZED HEALTH CARE EDUCATION/TRAINING PROGRAM

## 2019-12-05 PROCEDURE — 6370000000 HC RX 637 (ALT 250 FOR IP): Performed by: STUDENT IN AN ORGANIZED HEALTH CARE EDUCATION/TRAINING PROGRAM

## 2019-12-05 RX ORDER — IBUPROFEN 800 MG/1
800 TABLET ORAL 3 TIMES DAILY PRN
Status: DISCONTINUED | OUTPATIENT
Start: 2019-12-05 | End: 2019-12-07 | Stop reason: HOSPADM

## 2019-12-05 RX ORDER — ALLOPURINOL 100 MG/1
200 TABLET ORAL DAILY
Status: DISCONTINUED | OUTPATIENT
Start: 2019-12-06 | End: 2019-12-07 | Stop reason: HOSPADM

## 2019-12-05 RX ORDER — ASPIRIN 81 MG/1
324 TABLET, CHEWABLE ORAL ONCE
Status: COMPLETED | OUTPATIENT
Start: 2019-12-05 | End: 2019-12-05

## 2019-12-05 RX ORDER — LISINOPRIL 20 MG/1
40 TABLET ORAL DAILY
Status: DISCONTINUED | OUTPATIENT
Start: 2019-12-06 | End: 2019-12-07 | Stop reason: HOSPADM

## 2019-12-05 RX ORDER — POTASSIUM CITRATE 10 MEQ/1
10 TABLET, EXTENDED RELEASE ORAL 2 TIMES DAILY
Status: DISCONTINUED | OUTPATIENT
Start: 2019-12-05 | End: 2019-12-07 | Stop reason: HOSPADM

## 2019-12-05 RX ORDER — CITALOPRAM 20 MG/1
20 TABLET ORAL DAILY
Status: DISCONTINUED | OUTPATIENT
Start: 2019-12-06 | End: 2019-12-07 | Stop reason: HOSPADM

## 2019-12-05 RX ORDER — HYDROCHLOROTHIAZIDE 25 MG/1
25 TABLET ORAL DAILY
Status: DISCONTINUED | OUTPATIENT
Start: 2019-12-06 | End: 2019-12-07 | Stop reason: HOSPADM

## 2019-12-05 RX ORDER — METOPROLOL SUCCINATE 100 MG/1
100 TABLET, EXTENDED RELEASE ORAL DAILY
Status: DISCONTINUED | OUTPATIENT
Start: 2019-12-06 | End: 2019-12-07 | Stop reason: HOSPADM

## 2019-12-05 RX ADMIN — POTASSIUM CITRATE 10 MEQ: 10 TABLET ORAL at 23:15

## 2019-12-05 RX ADMIN — ASPIRIN 81 MG 324 MG: 81 TABLET ORAL at 20:45

## 2019-12-05 ASSESSMENT — ENCOUNTER SYMPTOMS
NAUSEA: 0
WHEEZING: 0
SINUS PRESSURE: 0
BACK PAIN: 0
COUGH: 0
DIARRHEA: 0
SORE THROAT: 0
EYE REDNESS: 0
ABDOMINAL DISTENTION: 0
SHORTNESS OF BREATH: 0
VOMITING: 0
EYE PAIN: 0
EYE DISCHARGE: 0

## 2019-12-05 ASSESSMENT — PAIN SCALES - GENERAL: PAINLEVEL_OUTOF10: 0

## 2019-12-06 LAB
ALBUMIN SERPL-MCNC: 3.9 G/DL (ref 3.5–5.2)
ALP BLD-CCNC: 99 U/L (ref 35–104)
ALT SERPL-CCNC: 83 U/L (ref 0–32)
ANION GAP SERPL CALCULATED.3IONS-SCNC: 13 MMOL/L (ref 7–16)
AST SERPL-CCNC: 110 U/L (ref 0–31)
BASOPHILS ABSOLUTE: 0.04 E9/L (ref 0–0.2)
BASOPHILS RELATIVE PERCENT: 0.4 % (ref 0–2)
BILIRUB SERPL-MCNC: 0.8 MG/DL (ref 0–1.2)
BUN BLDV-MCNC: 24 MG/DL (ref 8–23)
CALCIUM SERPL-MCNC: 9.6 MG/DL (ref 8.6–10.2)
CHLORIDE BLD-SCNC: 98 MMOL/L (ref 98–107)
CHOLESTEROL, TOTAL: 201 MG/DL (ref 0–199)
CK MB: 1.1 NG/ML (ref 0–4.3)
CK MB: 1.4 NG/ML (ref 0–4.3)
CK MB: <1 NG/ML (ref 0–4.3)
CO2: 26 MMOL/L (ref 22–29)
CREAT SERPL-MCNC: 0.9 MG/DL (ref 0.5–1)
EKG ATRIAL RATE: 53 BPM
EKG ATRIAL RATE: 67 BPM
EKG P AXIS: 36 DEGREES
EKG P AXIS: 62 DEGREES
EKG P-R INTERVAL: 164 MS
EKG P-R INTERVAL: 200 MS
EKG Q-T INTERVAL: 412 MS
EKG Q-T INTERVAL: 434 MS
EKG QRS DURATION: 88 MS
EKG QRS DURATION: 90 MS
EKG QTC CALCULATION (BAZETT): 407 MS
EKG QTC CALCULATION (BAZETT): 435 MS
EKG R AXIS: -6 DEGREES
EKG R AXIS: 88 DEGREES
EKG T AXIS: -9 DEGREES
EKG T AXIS: 18 DEGREES
EKG VENTRICULAR RATE: 53 BPM
EKG VENTRICULAR RATE: 67 BPM
EOSINOPHILS ABSOLUTE: 0.12 E9/L (ref 0.05–0.5)
EOSINOPHILS RELATIVE PERCENT: 1.3 % (ref 0–6)
GFR AFRICAN AMERICAN: >60
GFR NON-AFRICAN AMERICAN: >60 ML/MIN/1.73
GLUCOSE BLD-MCNC: 136 MG/DL (ref 74–99)
HBA1C MFR BLD: 6.1 % (ref 4–5.6)
HCT VFR BLD CALC: 39.7 % (ref 34–48)
HDLC SERPL-MCNC: 31 MG/DL
HEMOGLOBIN: 12.7 G/DL (ref 11.5–15.5)
IMMATURE GRANULOCYTES #: 0.05 E9/L
IMMATURE GRANULOCYTES %: 0.5 % (ref 0–5)
LDL CHOLESTEROL CALCULATED: 125 MG/DL (ref 0–99)
LV EF: 57 %
LVEF MODALITY: NORMAL
LYMPHOCYTES ABSOLUTE: 2.14 E9/L (ref 1.5–4)
LYMPHOCYTES RELATIVE PERCENT: 23.5 % (ref 20–42)
MAGNESIUM: 1.9 MG/DL (ref 1.6–2.6)
MCH RBC QN AUTO: 28.8 PG (ref 26–35)
MCHC RBC AUTO-ENTMCNC: 32 % (ref 32–34.5)
MCV RBC AUTO: 90 FL (ref 80–99.9)
MONOCYTES ABSOLUTE: 0.73 E9/L (ref 0.1–0.95)
MONOCYTES RELATIVE PERCENT: 8 % (ref 2–12)
NEUTROPHILS ABSOLUTE: 6.02 E9/L (ref 1.8–7.3)
NEUTROPHILS RELATIVE PERCENT: 66.3 % (ref 43–80)
PDW BLD-RTO: 15.2 FL (ref 11.5–15)
PHOSPHORUS: 4.5 MG/DL (ref 2.5–4.5)
PLATELET # BLD: 187 E9/L (ref 130–450)
PMV BLD AUTO: 11 FL (ref 7–12)
POTASSIUM SERPL-SCNC: 4 MMOL/L (ref 3.5–5)
RBC # BLD: 4.41 E12/L (ref 3.5–5.5)
SEDIMENTATION RATE, ERYTHROCYTE: 27 MM/HR (ref 0–20)
SODIUM BLD-SCNC: 137 MMOL/L (ref 132–146)
T4 FREE: 0.84 NG/DL (ref 0.93–1.7)
TOTAL CK: 46 U/L (ref 20–180)
TOTAL CK: 49 U/L (ref 20–180)
TOTAL CK: 56 U/L (ref 20–180)
TOTAL PROTEIN: 7.5 G/DL (ref 6.4–8.3)
TRIGL SERPL-MCNC: 223 MG/DL (ref 0–149)
TROPONIN: <0.01 NG/ML (ref 0–0.03)
VLDLC SERPL CALC-MCNC: 45 MG/DL
WBC # BLD: 9.1 E9/L (ref 4.5–11.5)

## 2019-12-06 PROCEDURE — G0378 HOSPITAL OBSERVATION PER HR: HCPCS

## 2019-12-06 PROCEDURE — 36415 COLL VENOUS BLD VENIPUNCTURE: CPT

## 2019-12-06 PROCEDURE — 84100 ASSAY OF PHOSPHORUS: CPT

## 2019-12-06 PROCEDURE — 6370000000 HC RX 637 (ALT 250 FOR IP): Performed by: INTERNAL MEDICINE

## 2019-12-06 PROCEDURE — C8929 TTE W OR WO FOL WCON,DOPPLER: HCPCS

## 2019-12-06 PROCEDURE — 80061 LIPID PANEL: CPT

## 2019-12-06 PROCEDURE — 83036 HEMOGLOBIN GLYCOSYLATED A1C: CPT

## 2019-12-06 PROCEDURE — 93005 ELECTROCARDIOGRAM TRACING: CPT | Performed by: INTERNAL MEDICINE

## 2019-12-06 PROCEDURE — 85025 COMPLETE CBC W/AUTO DIFF WBC: CPT

## 2019-12-06 PROCEDURE — 96372 THER/PROPH/DIAG INJ SC/IM: CPT

## 2019-12-06 PROCEDURE — 6360000004 HC RX CONTRAST MEDICATION: Performed by: INTERNAL MEDICINE

## 2019-12-06 PROCEDURE — 82550 ASSAY OF CK (CPK): CPT

## 2019-12-06 PROCEDURE — 84439 ASSAY OF FREE THYROXINE: CPT

## 2019-12-06 PROCEDURE — 80053 COMPREHEN METABOLIC PANEL: CPT

## 2019-12-06 PROCEDURE — 82553 CREATINE MB FRACTION: CPT

## 2019-12-06 PROCEDURE — 84484 ASSAY OF TROPONIN QUANT: CPT

## 2019-12-06 PROCEDURE — 83735 ASSAY OF MAGNESIUM: CPT

## 2019-12-06 PROCEDURE — 85651 RBC SED RATE NONAUTOMATED: CPT

## 2019-12-06 PROCEDURE — 6360000002 HC RX W HCPCS: Performed by: INTERNAL MEDICINE

## 2019-12-06 RX ORDER — LEVOTHYROXINE SODIUM 0.07 MG/1
75 TABLET ORAL DAILY
Status: DISCONTINUED | OUTPATIENT
Start: 2019-12-06 | End: 2019-12-07 | Stop reason: HOSPADM

## 2019-12-06 RX ORDER — ASPIRIN 81 MG/1
81 TABLET, CHEWABLE ORAL DAILY
Status: DISCONTINUED | OUTPATIENT
Start: 2019-12-06 | End: 2019-12-07 | Stop reason: HOSPADM

## 2019-12-06 RX ADMIN — HYDROCHLOROTHIAZIDE 25 MG: 25 TABLET ORAL at 09:35

## 2019-12-06 RX ADMIN — POTASSIUM CITRATE 10 MEQ: 10 TABLET ORAL at 19:50

## 2019-12-06 RX ADMIN — ENOXAPARIN SODIUM 40 MG: 40 INJECTION SUBCUTANEOUS at 09:36

## 2019-12-06 RX ADMIN — IBUPROFEN 800 MG: 800 TABLET, FILM COATED ORAL at 16:28

## 2019-12-06 RX ADMIN — PERFLUTREN 2.2 MG: 6.52 INJECTION, SUSPENSION INTRAVENOUS at 12:36

## 2019-12-06 RX ADMIN — ASPIRIN 81 MG 81 MG: 81 TABLET ORAL at 09:32

## 2019-12-06 RX ADMIN — LISINOPRIL 40 MG: 20 TABLET ORAL at 09:32

## 2019-12-06 RX ADMIN — CITALOPRAM HYDROBROMIDE 20 MG: 20 TABLET ORAL at 09:35

## 2019-12-06 RX ADMIN — ALLOPURINOL 200 MG: 100 TABLET ORAL at 09:38

## 2019-12-06 RX ADMIN — METOPROLOL SUCCINATE 100 MG: 100 TABLET, EXTENDED RELEASE ORAL at 09:32

## 2019-12-06 RX ADMIN — POTASSIUM CITRATE 10 MEQ: 10 TABLET ORAL at 09:32

## 2019-12-06 ASSESSMENT — PAIN SCALES - GENERAL
PAINLEVEL_OUTOF10: 2
PAINLEVEL_OUTOF10: 0
PAINLEVEL_OUTOF10: 0

## 2019-12-07 ENCOUNTER — APPOINTMENT (OUTPATIENT)
Dept: NUCLEAR MEDICINE | Age: 59
End: 2019-12-07
Payer: MEDICAID

## 2019-12-07 VITALS
RESPIRATION RATE: 18 BRPM | SYSTOLIC BLOOD PRESSURE: 151 MMHG | BODY MASS INDEX: 51.51 KG/M2 | DIASTOLIC BLOOD PRESSURE: 82 MMHG | HEART RATE: 63 BPM | TEMPERATURE: 97.8 F | OXYGEN SATURATION: 96 % | HEIGHT: 63 IN | WEIGHT: 290.7 LBS

## 2019-12-07 LAB
ANION GAP SERPL CALCULATED.3IONS-SCNC: 12 MMOL/L (ref 7–16)
BASOPHILS ABSOLUTE: 0.05 E9/L (ref 0–0.2)
BASOPHILS RELATIVE PERCENT: 0.5 % (ref 0–2)
BUN BLDV-MCNC: 25 MG/DL (ref 8–23)
CALCIUM SERPL-MCNC: 9.7 MG/DL (ref 8.6–10.2)
CHLORIDE BLD-SCNC: 99 MMOL/L (ref 98–107)
CO2: 29 MMOL/L (ref 22–29)
CREAT SERPL-MCNC: 1 MG/DL (ref 0.5–1)
EOSINOPHILS ABSOLUTE: 0.11 E9/L (ref 0.05–0.5)
EOSINOPHILS RELATIVE PERCENT: 1.2 % (ref 0–6)
GFR AFRICAN AMERICAN: >60
GFR NON-AFRICAN AMERICAN: 56 ML/MIN/1.73
GLUCOSE BLD-MCNC: 131 MG/DL (ref 74–99)
HCT VFR BLD CALC: 40 % (ref 34–48)
HEMOGLOBIN: 13.1 G/DL (ref 11.5–15.5)
IMMATURE GRANULOCYTES #: 0.06 E9/L
IMMATURE GRANULOCYTES %: 0.6 % (ref 0–5)
LV EF: 79 %
LVEF MODALITY: NORMAL
LYMPHOCYTES ABSOLUTE: 1.73 E9/L (ref 1.5–4)
LYMPHOCYTES RELATIVE PERCENT: 18.2 % (ref 20–42)
MCH RBC QN AUTO: 29.4 PG (ref 26–35)
MCHC RBC AUTO-ENTMCNC: 32.8 % (ref 32–34.5)
MCV RBC AUTO: 89.9 FL (ref 80–99.9)
MONOCYTES ABSOLUTE: 0.63 E9/L (ref 0.1–0.95)
MONOCYTES RELATIVE PERCENT: 6.6 % (ref 2–12)
NEUTROPHILS ABSOLUTE: 6.94 E9/L (ref 1.8–7.3)
NEUTROPHILS RELATIVE PERCENT: 72.9 % (ref 43–80)
PDW BLD-RTO: 14.9 FL (ref 11.5–15)
PLATELET # BLD: 184 E9/L (ref 130–450)
PMV BLD AUTO: 11.2 FL (ref 7–12)
POTASSIUM SERPL-SCNC: 4.1 MMOL/L (ref 3.5–5)
RBC # BLD: 4.45 E12/L (ref 3.5–5.5)
SODIUM BLD-SCNC: 140 MMOL/L (ref 132–146)
WBC # BLD: 9.5 E9/L (ref 4.5–11.5)

## 2019-12-07 PROCEDURE — 80048 BASIC METABOLIC PNL TOTAL CA: CPT

## 2019-12-07 PROCEDURE — 36415 COLL VENOUS BLD VENIPUNCTURE: CPT

## 2019-12-07 PROCEDURE — 6360000002 HC RX W HCPCS: Performed by: INTERNAL MEDICINE

## 2019-12-07 PROCEDURE — A9500 TC99M SESTAMIBI: HCPCS | Performed by: RADIOLOGY

## 2019-12-07 PROCEDURE — 85025 COMPLETE CBC W/AUTO DIFF WBC: CPT

## 2019-12-07 PROCEDURE — G0378 HOSPITAL OBSERVATION PER HR: HCPCS

## 2019-12-07 PROCEDURE — 3430000000 HC RX DIAGNOSTIC RADIOPHARMACEUTICAL: Performed by: RADIOLOGY

## 2019-12-07 PROCEDURE — 93017 CV STRESS TEST TRACING ONLY: CPT

## 2019-12-07 PROCEDURE — 78452 HT MUSCLE IMAGE SPECT MULT: CPT

## 2019-12-07 RX ORDER — LEVOTHYROXINE SODIUM 0.07 MG/1
75 TABLET ORAL DAILY
Qty: 30 TABLET | Refills: 3 | Status: SHIPPED | OUTPATIENT
Start: 2019-12-08 | End: 2020-04-13 | Stop reason: SDUPTHER

## 2019-12-07 RX ADMIN — Medication 30 MILLICURIE: at 11:24

## 2019-12-07 RX ADMIN — REGADENOSON 0.4 MG: 0.08 INJECTION, SOLUTION INTRAVENOUS at 10:10

## 2019-12-07 RX ADMIN — Medication 10 MILLICURIE: at 08:18

## 2019-12-07 ASSESSMENT — PAIN SCALES - GENERAL: PAINLEVEL_OUTOF10: 0

## 2020-01-13 ENCOUNTER — OFFICE VISIT (OUTPATIENT)
Dept: FAMILY MEDICINE CLINIC | Age: 60
End: 2020-01-13
Payer: MEDICAID

## 2020-01-13 ENCOUNTER — HOSPITAL ENCOUNTER (OUTPATIENT)
Age: 60
Discharge: HOME | End: 2020-01-15
Payer: MEDICAID

## 2020-01-13 VITALS
RESPIRATION RATE: 18 BRPM | TEMPERATURE: 98.7 F | HEART RATE: 64 BPM | BODY MASS INDEX: 51.21 KG/M2 | WEIGHT: 289 LBS | SYSTOLIC BLOOD PRESSURE: 124 MMHG | OXYGEN SATURATION: 94 % | DIASTOLIC BLOOD PRESSURE: 84 MMHG | HEIGHT: 63 IN

## 2020-01-13 LAB
CHP ED QC CHECK: NORMAL
GLUCOSE BLD-MCNC: 142 MG/DL
TSH SERPL DL<=0.05 MIU/L-ACNC: 2.98 UIU/ML (ref 0.27–4.2)

## 2020-01-13 PROCEDURE — 1036F TOBACCO NON-USER: CPT | Performed by: FAMILY MEDICINE

## 2020-01-13 PROCEDURE — 99214 OFFICE O/P EST MOD 30 MIN: CPT | Performed by: FAMILY MEDICINE

## 2020-01-13 PROCEDURE — G8482 FLU IMMUNIZE ORDER/ADMIN: HCPCS | Performed by: FAMILY MEDICINE

## 2020-01-13 PROCEDURE — 3017F COLORECTAL CA SCREEN DOC REV: CPT | Performed by: FAMILY MEDICINE

## 2020-01-13 PROCEDURE — G8417 CALC BMI ABV UP PARAM F/U: HCPCS | Performed by: FAMILY MEDICINE

## 2020-01-13 PROCEDURE — G8427 DOCREV CUR MEDS BY ELIG CLIN: HCPCS | Performed by: FAMILY MEDICINE

## 2020-01-13 PROCEDURE — 84443 ASSAY THYROID STIM HORMONE: CPT

## 2020-01-13 PROCEDURE — 80074 ACUTE HEPATITIS PANEL: CPT

## 2020-01-13 PROCEDURE — 82962 GLUCOSE BLOOD TEST: CPT | Performed by: FAMILY MEDICINE

## 2020-01-13 RX ORDER — IBUPROFEN 800 MG/1
TABLET ORAL
Qty: 120 TABLET | Refills: 0 | Status: CANCELLED | OUTPATIENT
Start: 2020-01-13

## 2020-01-13 RX ORDER — ERGOCALCIFEROL 1.25 MG/1
50000 CAPSULE ORAL WEEKLY
Qty: 4 CAPSULE | Refills: 3 | Status: SHIPPED
Start: 2020-01-13 | End: 2020-06-13 | Stop reason: SDUPTHER

## 2020-01-14 ENCOUNTER — HOSPITAL ENCOUNTER (OUTPATIENT)
Dept: ULTRASOUND IMAGING | Age: 60
Discharge: HOME | End: 2020-01-14
Payer: MEDICAID

## 2020-01-14 LAB
HAV IGM SER IA-ACNC: NORMAL
HEPATITIS B CORE IGM ANTIBODY: NORMAL
HEPATITIS B SURFACE ANTIGEN INTERPRETATION: NORMAL
HEPATITIS C ANTIBODY INTERPRETATION: NORMAL

## 2020-01-14 PROCEDURE — 76705 ECHO EXAM OF ABDOMEN: CPT

## 2020-01-15 NOTE — TELEPHONE ENCOUNTER
Pt called asking if IBU 800mg may be refilled. Please advise.     Electronically signed by Georges Raya MA on 1/15/20 at 4:01 PM

## 2020-01-16 RX ORDER — IBUPROFEN 800 MG/1
TABLET ORAL
Qty: 120 TABLET | Refills: 0 | Status: SHIPPED
Start: 2020-01-16 | End: 2020-04-11 | Stop reason: SDUPTHER

## 2020-01-17 ENCOUNTER — TELEPHONE (OUTPATIENT)
Dept: FAMILY MEDICINE CLINIC | Age: 60
End: 2020-01-17

## 2020-01-17 PROBLEM — E66.01 MORBID OBESITY (HCC): Status: ACTIVE | Noted: 2020-01-17

## 2020-01-17 PROBLEM — M19.90 OSTEOARTHRITIS: Status: ACTIVE | Noted: 2020-01-17

## 2020-01-17 PROBLEM — E03.9 ACQUIRED HYPOTHYROIDISM: Status: ACTIVE | Noted: 2020-01-17

## 2020-01-17 PROBLEM — R74.8 ELEVATED LIVER ENZYMES: Status: ACTIVE | Noted: 2020-01-17

## 2020-01-17 ASSESSMENT — ENCOUNTER SYMPTOMS
VOMITING: 0
RESPIRATORY NEGATIVE: 1
NAUSEA: 0
WHEEZING: 0
EYE ITCHING: 0
SINUS PRESSURE: 0
CHEST TIGHTNESS: 0
EYES NEGATIVE: 1
TROUBLE SWALLOWING: 0
ABDOMINAL PAIN: 0
SORE THROAT: 0
COLOR CHANGE: 0
EYE DISCHARGE: 0
BACK PAIN: 0
BLOOD IN STOOL: 1
FACIAL SWELLING: 0
ANAL BLEEDING: 0
DIARRHEA: 0
COUGH: 0
EYE REDNESS: 0
SINUS PAIN: 0
RHINORRHEA: 0
CONSTIPATION: 0
VOICE CHANGE: 0
RECTAL PAIN: 0
PHOTOPHOBIA: 0
EYE PAIN: 0
CHOKING: 0
ABDOMINAL DISTENTION: 0
ALLERGIC/IMMUNOLOGIC NEGATIVE: 1
STRIDOR: 0
SHORTNESS OF BREATH: 0

## 2020-01-17 NOTE — PROGRESS NOTES
diarrhea, nausea, rectal pain and vomiting. Endocrine: Negative. Negative for cold intolerance, heat intolerance, polydipsia, polyphagia and polyuria. Genitourinary: Positive for frequency and urgency. Negative for decreased urine volume, difficulty urinating, dysuria, flank pain, genital sores, hematuria, menstrual problem and pelvic pain. Musculoskeletal: Positive for arthralgias and myalgias. Negative for back pain, gait problem, joint swelling, neck pain and neck stiffness. Skin: Negative. Negative for color change, pallor, rash and wound. Allergic/Immunologic: Negative. Neurological: Negative. Negative for dizziness, tremors, seizures, syncope, facial asymmetry, speech difficulty, weakness, light-headedness, numbness and headaches. Hematological: Negative. Negative for adenopathy. Does not bruise/bleed easily. Psychiatric/Behavioral: Positive for sleep disturbance. Negative for agitation, behavioral problems, confusion, decreased concentration, dysphoric mood, hallucinations, self-injury and suicidal ideas. The patient is nervous/anxious. The patient is not hyperactive. Past Medical/Surgical Hx;  Reviewed with patient      Diagnosis Date    Hx of cardiovascular stress test 12/17/2019    Lexiscan stress test    Hypertension      Past Surgical History:   Procedure Laterality Date    HYSTERECTOMY         Past Family Hx:  Reviewed with patient      Family history unknown: Yes       Social Hx:  Reviewed with patient  Social History     Tobacco Use    Smoking status: Never Smoker    Smokeless tobacco: Never Used   Substance Use Topics    Alcohol use: Yes       OBJECTIVE  /84   Pulse 64   Temp 98.7 °F (37.1 °C) (Temporal)   Resp 18   Ht 5' 3\" (1.6 m)   Wt 289 lb (131.1 kg)   LMP  (LMP Unknown)   SpO2 94%   Breastfeeding? No   BMI 51.19 kg/m²     Problem List:  Sharla does not have any pertinent problems on file.     PHYS EX:  Physical Exam  Vitals signs and nursing note symptoms of irregular blood glucose. (Patient not taking: Reported on 1/13/2020) 200 each 3    blood glucose monitor strips Test 2 times a day & as needed for symptoms of irregular blood glucose. (Patient not taking: Reported on 1/13/2020) 200 strip 3    glucose monitoring kit (FREESTYLE) monitoring kit Test 2 times a day & as needed for symptoms of irregular blood glucose. (Patient not taking: Reported on 1/13/2020) 1 kit 0     No facility-administered encounter medications on file as of 1/13/2020.         Return in about 6 weeks (around 2/24/2020) for after specialist.        Reviewed recent labs related to Sharla's current problems      Discussed importance of regular Health Maintenance follow up  Health Maintenance   Topic    HIV screen     Cervical cancer screen     Breast cancer screen     Shingles Vaccine (1 of 2)    Colon Cancer Screen FIT/FOBT     A1C test (Diabetic or Prediabetic)     Potassium monitoring     Creatinine monitoring     TSH testing     Lipid screen     DTaP/Tdap/Td vaccine (3 - Td)    Flu vaccine     Hepatitis C screen     Pneumococcal 0-64 years Vaccine

## 2020-01-20 NOTE — TELEPHONE ENCOUNTER
Patient called back to the office. Advised she needs to see a surgeon regarding gallbladder. Patient is amenable. Referral placed. Also advised Dr. Dupont Officer is not able to sign off on her plasma donation due to her not following with GI for her positive FIT test.  Patient states she will call the GI specialist now and reschedule an appointment. This MA offered the phone number. Patient declined stating she has the number.   Electronically signed by Angelique Cordova on 1/20/2020 at 11:38 AM

## 2020-01-20 NOTE — TELEPHONE ENCOUNTER
Left message on voicemail for patient to return call to the office regarding forms and test results.   Electronically signed by Ki Rivera on 1/20/2020 at 9:46 AM

## 2020-01-27 ENCOUNTER — TELEPHONE (OUTPATIENT)
Dept: FAMILY MEDICINE CLINIC | Age: 60
End: 2020-01-27

## 2020-01-27 ENCOUNTER — PATIENT MESSAGE (OUTPATIENT)
Dept: FAMILY MEDICINE CLINIC | Age: 60
End: 2020-01-27

## 2020-01-27 NOTE — TELEPHONE ENCOUNTER
From: Jonnie Mir  To: Wendelyn Bumpers, DO  Sent: 1/27/2020 11:02 AM EST  Subject: Test Results Question    On my creatinine,Ur mine was 503mg and standard range is 226 is there any concern this being that high?   Thank you

## 2020-01-27 NOTE — TELEPHONE ENCOUNTER
Referral placed. Patient has been notified by my chart.   Electronically signed by Yolis Douglass on 1/27/2020 at 2:51 PM

## 2020-01-27 NOTE — TELEPHONE ENCOUNTER
----- Message from Minesh España DO sent at 1/27/2020  1:40 PM EST -----  Set up nephrology abnormal kidney tests

## 2020-02-10 ENCOUNTER — TELEPHONE (OUTPATIENT)
Dept: SURGERY | Age: 60
End: 2020-02-10

## 2020-02-10 ENCOUNTER — OFFICE VISIT (OUTPATIENT)
Dept: SURGERY | Age: 60
End: 2020-02-10
Payer: MEDICAID

## 2020-02-10 VITALS
WEIGHT: 280 LBS | HEIGHT: 63 IN | TEMPERATURE: 96.9 F | SYSTOLIC BLOOD PRESSURE: 192 MMHG | BODY MASS INDEX: 49.61 KG/M2 | HEART RATE: 57 BPM | OXYGEN SATURATION: 93 % | DIASTOLIC BLOOD PRESSURE: 84 MMHG

## 2020-02-10 PROCEDURE — 3017F COLORECTAL CA SCREEN DOC REV: CPT | Performed by: SURGERY

## 2020-02-10 PROCEDURE — 1036F TOBACCO NON-USER: CPT | Performed by: SURGERY

## 2020-02-10 PROCEDURE — G8482 FLU IMMUNIZE ORDER/ADMIN: HCPCS | Performed by: SURGERY

## 2020-02-10 PROCEDURE — 99204 OFFICE O/P NEW MOD 45 MIN: CPT | Performed by: SURGERY

## 2020-02-10 PROCEDURE — G8427 DOCREV CUR MEDS BY ELIG CLIN: HCPCS | Performed by: SURGERY

## 2020-02-10 PROCEDURE — G8417 CALC BMI ABV UP PARAM F/U: HCPCS | Performed by: SURGERY

## 2020-02-10 NOTE — TELEPHONE ENCOUNTER
Per Dr. Jasson Loco, patient is scheduled for laparoscopic possible robotic cholecystectomy with intraoperative cholangiogram at [facility] on 3/5/20. Surgery scheduling form faxed with confirmation, surgeon's calendar updated. Dr. Jasson Loco to enter orders. Follow up appointment scheduled. Per [facility] provider portal,no prior authorization is required for scheduled outpatient procedure (cpt 56545). Decision ID: [id number].   Electronically signed by Price Malone RN on 2/10/2020 at 2:07 PM

## 2020-02-24 RX ORDER — SODIUM CHLORIDE 0.9 % (FLUSH) 0.9 %
10 SYRINGE (ML) INJECTION EVERY 12 HOURS SCHEDULED
Status: CANCELLED | OUTPATIENT
Start: 2020-02-24

## 2020-02-24 RX ORDER — SODIUM CHLORIDE 9 MG/ML
INJECTION, SOLUTION INTRAVENOUS CONTINUOUS
Status: CANCELLED | OUTPATIENT
Start: 2020-02-24

## 2020-02-24 RX ORDER — SODIUM CHLORIDE 0.9 % (FLUSH) 0.9 %
10 SYRINGE (ML) INJECTION PRN
Status: CANCELLED | OUTPATIENT
Start: 2020-02-24

## 2020-02-28 ENCOUNTER — TELEPHONE (OUTPATIENT)
Dept: SURGERY | Age: 60
End: 2020-02-28

## 2020-02-28 NOTE — TELEPHONE ENCOUNTER
Patient phoned the office to reschedule her surgery. Patient is currently on antibiotics for two infected teeth. Patient will be having teeth pulled on 3.6.2020 and has rescheduled to 3.26.2020. Call placed to surgery scheduling to reschedule surgery, I spoke with Riri Rodriguez. Patients post op follow up appointment also reschedule.   Electronically signed by Howard Yanes on 2/28/20 at 2:46 PM

## 2020-03-17 ENCOUNTER — HOSPITAL ENCOUNTER (EMERGENCY)
Age: 60
Discharge: HOME | End: 2020-03-17
Attending: EMERGENCY MEDICINE
Payer: MEDICAID

## 2020-03-17 ENCOUNTER — APPOINTMENT (OUTPATIENT)
Dept: GENERAL RADIOLOGY | Age: 60
End: 2020-03-17
Payer: MEDICAID

## 2020-03-17 VITALS
BODY MASS INDEX: 49.88 KG/M2 | HEIGHT: 63 IN | OXYGEN SATURATION: 96 % | HEART RATE: 60 BPM | WEIGHT: 281.5 LBS | RESPIRATION RATE: 18 BRPM | DIASTOLIC BLOOD PRESSURE: 88 MMHG | TEMPERATURE: 98.4 F | SYSTOLIC BLOOD PRESSURE: 145 MMHG

## 2020-03-17 LAB
ANION GAP SERPL CALCULATED.3IONS-SCNC: 16 MMOL/L (ref 7–16)
BASOPHILS ABSOLUTE: 0.05 E9/L (ref 0–0.2)
BASOPHILS RELATIVE PERCENT: 0.6 % (ref 0–2)
BUN BLDV-MCNC: 21 MG/DL (ref 8–23)
CALCIUM SERPL-MCNC: 9.7 MG/DL (ref 8.6–10.2)
CHLORIDE BLD-SCNC: 98 MMOL/L (ref 98–107)
CO2: 23 MMOL/L (ref 22–29)
CREAT SERPL-MCNC: 0.8 MG/DL (ref 0.5–1)
EKG ATRIAL RATE: 78 BPM
EKG P AXIS: 63 DEGREES
EKG P-R INTERVAL: 196 MS
EKG Q-T INTERVAL: 392 MS
EKG QRS DURATION: 86 MS
EKG QTC CALCULATION (BAZETT): 446 MS
EKG R AXIS: -20 DEGREES
EKG T AXIS: 1 DEGREES
EKG VENTRICULAR RATE: 78 BPM
EOSINOPHILS ABSOLUTE: 0.18 E9/L (ref 0.05–0.5)
EOSINOPHILS RELATIVE PERCENT: 2.1 % (ref 0–6)
GFR AFRICAN AMERICAN: >60
GFR NON-AFRICAN AMERICAN: >60 ML/MIN/1.73
GLUCOSE BLD-MCNC: 210 MG/DL (ref 74–99)
HCT VFR BLD CALC: 39.5 % (ref 34–48)
HEMOGLOBIN: 12.7 G/DL (ref 11.5–15.5)
IMMATURE GRANULOCYTES #: 0.06 E9/L
IMMATURE GRANULOCYTES %: 0.7 % (ref 0–5)
LYMPHOCYTES ABSOLUTE: 2.75 E9/L (ref 1.5–4)
LYMPHOCYTES RELATIVE PERCENT: 31.9 % (ref 20–42)
MCH RBC QN AUTO: 29.3 PG (ref 26–35)
MCHC RBC AUTO-ENTMCNC: 32.2 % (ref 32–34.5)
MCV RBC AUTO: 91 FL (ref 80–99.9)
MONOCYTES ABSOLUTE: 0.81 E9/L (ref 0.1–0.95)
MONOCYTES RELATIVE PERCENT: 9.4 % (ref 2–12)
NEUTROPHILS ABSOLUTE: 4.76 E9/L (ref 1.8–7.3)
NEUTROPHILS RELATIVE PERCENT: 55.3 % (ref 43–80)
PDW BLD-RTO: 15 FL (ref 11.5–15)
PLATELET # BLD: 193 E9/L (ref 130–450)
PMV BLD AUTO: 11.3 FL (ref 7–12)
POTASSIUM REFLEX MAGNESIUM: 3.9 MMOL/L (ref 3.5–5)
RBC # BLD: 4.34 E12/L (ref 3.5–5.5)
SODIUM BLD-SCNC: 137 MMOL/L (ref 132–146)
TROPONIN: <0.01 NG/ML (ref 0–0.03)
TROPONIN: <0.01 NG/ML (ref 0–0.03)
WBC # BLD: 8.6 E9/L (ref 4.5–11.5)

## 2020-03-17 PROCEDURE — 80048 BASIC METABOLIC PNL TOTAL CA: CPT

## 2020-03-17 PROCEDURE — 2500000003 HC RX 250 WO HCPCS: Performed by: EMERGENCY MEDICINE

## 2020-03-17 PROCEDURE — 84484 ASSAY OF TROPONIN QUANT: CPT

## 2020-03-17 PROCEDURE — 36415 COLL VENOUS BLD VENIPUNCTURE: CPT

## 2020-03-17 PROCEDURE — 96374 THER/PROPH/DIAG INJ IV PUSH: CPT

## 2020-03-17 PROCEDURE — 93005 ELECTROCARDIOGRAM TRACING: CPT | Performed by: EMERGENCY MEDICINE

## 2020-03-17 PROCEDURE — 99283 EMERGENCY DEPT VISIT LOW MDM: CPT

## 2020-03-17 PROCEDURE — 71046 X-RAY EXAM CHEST 2 VIEWS: CPT

## 2020-03-17 PROCEDURE — 85025 COMPLETE CBC W/AUTO DIFF WBC: CPT

## 2020-03-17 RX ORDER — METOPROLOL TARTRATE 5 MG/5ML
5 INJECTION INTRAVENOUS ONCE
Status: COMPLETED | OUTPATIENT
Start: 2020-03-17 | End: 2020-03-17

## 2020-03-17 RX ADMIN — METOPROLOL TARTRATE 5 MG: 5 INJECTION INTRAVENOUS at 04:43

## 2020-03-17 NOTE — ED PROVIDER NOTES
WBC 8.6 4.5 - 11.5 E9/L    RBC 4.34 3.50 - 5.50 E12/L    Hemoglobin 12.7 11.5 - 15.5 g/dL    Hematocrit 39.5 34.0 - 48.0 %    MCV 91.0 80.0 - 99.9 fL    MCH 29.3 26.0 - 35.0 pg    MCHC 32.2 32.0 - 34.5 %    RDW 15.0 11.5 - 15.0 fL    Platelets 070 912 - 410 E9/L    MPV 11.3 7.0 - 12.0 fL    Neutrophils % 55.3 43.0 - 80.0 %    Immature Granulocytes % 0.7 0.0 - 5.0 %    Lymphocytes % 31.9 20.0 - 42.0 %    Monocytes % 9.4 2.0 - 12.0 %    Eosinophils % 2.1 0.0 - 6.0 %    Basophils % 0.6 0.0 - 2.0 %    Neutrophils Absolute 4.76 1.80 - 7.30 E9/L    Immature Granulocytes # 0.06 E9/L    Lymphocytes Absolute 2.75 1.50 - 4.00 E9/L    Monocytes Absolute 0.81 0.10 - 0.95 E9/L    Eosinophils Absolute 0.18 0.05 - 0.50 E9/L    Basophils Absolute 0.05 0.00 - 0.20 O8/J   Basic Metabolic Panel w/ Reflex to MG   Result Value Ref Range    Sodium 137 132 - 146 mmol/L    Potassium reflex Magnesium 3.9 3.5 - 5.0 mmol/L    Chloride 98 98 - 107 mmol/L    CO2 23 22 - 29 mmol/L    Anion Gap 16 7 - 16 mmol/L    Glucose 210 (H) 74 - 99 mg/dL    BUN 21 8 - 23 mg/dL    CREATININE 0.8 0.5 - 1.0 mg/dL    GFR Non-African American >60 >=60 mL/min/1.73    GFR African American >60     Calcium 9.7 8.6 - 10.2 mg/dL   Troponin   Result Value Ref Range    Troponin <0.01 0.00 - 0.03 ng/mL   Troponin   Result Value Ref Range    Troponin <0.01 0.00 - 0.03 ng/mL   EKG 12 Lead   Result Value Ref Range    Ventricular Rate 78 BPM    Atrial Rate 78 BPM    P-R Interval 196 ms    QRS Duration 86 ms    Q-T Interval 392 ms    QTc Calculation (Bazett) 446 ms    P Axis 63 degrees    R Axis -20 degrees    T Axis 1 degrees       RADIOLOGY:  Interpreted by Radiologist.  XR CHEST STANDARD (2 VW)   Final Result   1. No acute findings.                EKG Interpretation  Interpreted by emergency department physician    Rhythm: normal sinus   Rate: normal  Axis: normal  Conduction: normal  ST Segments: normal  T Waves: no acute change    Clinical Impression: no acute

## 2020-04-11 ENCOUNTER — PATIENT MESSAGE (OUTPATIENT)
Dept: FAMILY MEDICINE CLINIC | Age: 60
End: 2020-04-11

## 2020-04-13 RX ORDER — LEVOTHYROXINE SODIUM 0.07 MG/1
75 TABLET ORAL DAILY
Qty: 90 TABLET | Refills: 0 | Status: SHIPPED
Start: 2020-04-13 | End: 2020-07-09 | Stop reason: SDUPTHER

## 2020-04-28 ENCOUNTER — TELEPHONE (OUTPATIENT)
Dept: SURGERY | Age: 60
End: 2020-04-28

## 2020-05-04 ENCOUNTER — TELEPHONE (OUTPATIENT)
Dept: SURGERY | Age: 60
End: 2020-05-04

## 2020-05-04 ENCOUNTER — HOSPITAL ENCOUNTER (OUTPATIENT)
Dept: PREADMISSION TESTING | Age: 60
Discharge: HOME | End: 2020-05-04
Payer: MEDICAID

## 2020-05-04 VITALS
BODY MASS INDEX: 51.91 KG/M2 | WEIGHT: 293 LBS | HEIGHT: 63 IN | OXYGEN SATURATION: 95 % | HEART RATE: 77 BPM | TEMPERATURE: 96.8 F | SYSTOLIC BLOOD PRESSURE: 159 MMHG | RESPIRATION RATE: 20 BRPM | DIASTOLIC BLOOD PRESSURE: 77 MMHG

## 2020-05-04 LAB
ALBUMIN SERPL-MCNC: 4.3 G/DL (ref 3.5–5.2)
ALP BLD-CCNC: 125 U/L (ref 35–104)
ALT SERPL-CCNC: 82 U/L (ref 0–32)
ANION GAP SERPL CALCULATED.3IONS-SCNC: 14 MMOL/L (ref 7–16)
AST SERPL-CCNC: 100 U/L (ref 0–31)
BILIRUB SERPL-MCNC: 0.5 MG/DL (ref 0–1.2)
BUN BLDV-MCNC: 23 MG/DL (ref 8–23)
CALCIUM SERPL-MCNC: 9.9 MG/DL (ref 8.6–10.2)
CHLORIDE BLD-SCNC: 99 MMOL/L (ref 98–107)
CO2: 24 MMOL/L (ref 22–29)
CREAT SERPL-MCNC: 0.9 MG/DL (ref 0.5–1)
GFR AFRICAN AMERICAN: >60
GFR NON-AFRICAN AMERICAN: >60 ML/MIN/1.73
GLUCOSE BLD-MCNC: 201 MG/DL (ref 74–99)
HCT VFR BLD CALC: 41 % (ref 34–48)
HEMOGLOBIN: 13.3 G/DL (ref 11.5–15.5)
MCH RBC QN AUTO: 29.2 PG (ref 26–35)
MCHC RBC AUTO-ENTMCNC: 32.4 % (ref 32–34.5)
MCV RBC AUTO: 89.9 FL (ref 80–99.9)
PDW BLD-RTO: 14.7 FL (ref 11.5–15)
PLATELET # BLD: 196 E9/L (ref 130–450)
PMV BLD AUTO: 11.6 FL (ref 7–12)
POTASSIUM REFLEX MAGNESIUM: 4.2 MMOL/L (ref 3.5–5)
RBC # BLD: 4.56 E12/L (ref 3.5–5.5)
SODIUM BLD-SCNC: 137 MMOL/L (ref 132–146)
TOTAL PROTEIN: 8.1 G/DL (ref 6.4–8.3)
WBC # BLD: 9.3 E9/L (ref 4.5–11.5)

## 2020-05-04 PROCEDURE — 80053 COMPREHEN METABOLIC PANEL: CPT

## 2020-05-04 PROCEDURE — 36415 COLL VENOUS BLD VENIPUNCTURE: CPT

## 2020-05-04 PROCEDURE — 85027 COMPLETE CBC AUTOMATED: CPT

## 2020-05-04 NOTE — PROGRESS NOTES
Returned call to pt. She missed her Covid testing on Saturday 5/2/2020. Informed her that her surgery will have to be rescheduled. Called [facility] at Dr Elías Powell and notified of this. They will reschedule and contact mine Robles  5/4/2020  8:41 AM

## 2020-05-05 ENCOUNTER — HOSPITAL ENCOUNTER (OUTPATIENT)
Age: 60
Setting detail: OUTPATIENT SURGERY
Discharge: HOME | End: 2020-05-18
Attending: SURGERY
Payer: MEDICAID

## 2020-05-05 ENCOUNTER — HOSPITAL ENCOUNTER (OUTPATIENT)
Age: 60
Discharge: HOME | End: 2020-05-07
Payer: MEDICAID

## 2020-05-05 PROCEDURE — U0003 INFECTIOUS AGENT DETECTION BY NUCLEIC ACID (DNA OR RNA); SEVERE ACUTE RESPIRATORY SYNDROME CORONAVIRUS 2 (SARS-COV-2) (CORONAVIRUS DISEASE [COVID-19]), AMPLIFIED PROBE TECHNIQUE, MAKING USE OF HIGH THROUGHPUT TECHNOLOGIES AS DESCRIBED BY CMS-2020-01-R: HCPCS

## 2020-05-08 ENCOUNTER — TELEPHONE (OUTPATIENT)
Dept: SURGERY | Age: 60
End: 2020-05-08

## 2020-05-09 LAB
SARS-COV-2: NOT DETECTED
SOURCE: NORMAL

## 2020-05-11 NOTE — TELEPHONE ENCOUNTER
Third call placed to patient to discuss rescheduling procedure. Patient not available, message left for patient to please return my call to discuss. Letter also mailed to patient.   Electronically signed by Malka Marte on 5/11/20 at 2:52 PM EDT

## 2020-05-11 NOTE — TELEPHONE ENCOUNTER
Per the order of Dr. Farhan Minor, patient has been scheduled for [facility] on 5.18.2020 with Dr. Yvette Bernabe. Patient provided with verbal instructions over the phone and also informed of the possible need to have COVID repeat testing done. Patient instructed to please contact our office with any questions. Patient scheduled for post op follow up on 6. 3.2020 with Dr. Tami Castle. Surgery scheduling form faxed to [facility] surgery scheduling and fax confirmation received. Dr. Tami Castle to enter orders. Patient has [facility] insurance and pre cert is not required for CPT [id number].   Electronically signed by Ana Murray on 5/11/20 at 3:26 PM EDT

## 2020-05-13 ENCOUNTER — TELEPHONE (OUTPATIENT)
Dept: SURGERY | Age: 60
End: 2020-05-13

## 2020-05-14 NOTE — TELEPHONE ENCOUNTER
Patient returned call. Patient verified her phone number and is not sure why it is not receiving calls. Patient will check with her phone provider. Patient informed that she must have COVID testing repeated for surgery. Patient aware that this must be done tomorrow between the hours of 6-10 am and what location she is to go to. Patient verbalized understanding and has no further questions. Call placed to PAT to inform them that she has been informed.   Electronically signed by Nancy Montes De Oca on 5/14/20 at 1:11 PM EDT

## 2020-05-15 ENCOUNTER — ANESTHESIA EVENT (OUTPATIENT)
Dept: OPERATING ROOM | Age: 60
End: 2020-05-15
Payer: MEDICAID

## 2020-05-15 ENCOUNTER — HOSPITAL ENCOUNTER (OUTPATIENT)
Age: 60
Setting detail: SPECIMEN
Discharge: HOME | End: 2020-05-15
Payer: MEDICAID

## 2020-05-15 PROCEDURE — U0003 INFECTIOUS AGENT DETECTION BY NUCLEIC ACID (DNA OR RNA); SEVERE ACUTE RESPIRATORY SYNDROME CORONAVIRUS 2 (SARS-COV-2) (CORONAVIRUS DISEASE [COVID-19]), AMPLIFIED PROBE TECHNIQUE, MAKING USE OF HIGH THROUGHPUT TECHNOLOGIES AS DESCRIBED BY CMS-2020-01-R: HCPCS

## 2020-05-17 LAB
SARS-COV-2: NOT DETECTED
SOURCE: NORMAL

## 2020-05-18 ENCOUNTER — ANESTHESIA (OUTPATIENT)
Dept: OPERATING ROOM | Age: 60
End: 2020-05-18
Payer: MEDICAID

## 2020-05-18 ENCOUNTER — APPOINTMENT (OUTPATIENT)
Dept: GENERAL RADIOLOGY | Age: 60
End: 2020-05-18
Attending: SURGERY
Payer: MEDICAID

## 2020-05-18 VITALS
WEIGHT: 280.38 LBS | DIASTOLIC BLOOD PRESSURE: 57 MMHG | OXYGEN SATURATION: 90 % | SYSTOLIC BLOOD PRESSURE: 122 MMHG | TEMPERATURE: 96.8 F | RESPIRATION RATE: 16 BRPM | HEIGHT: 63 IN | HEART RATE: 61 BPM | BODY MASS INDEX: 49.68 KG/M2

## 2020-05-18 VITALS
OXYGEN SATURATION: 91 % | RESPIRATION RATE: 7 BRPM | TEMPERATURE: 98.1 F | DIASTOLIC BLOOD PRESSURE: 91 MMHG | SYSTOLIC BLOOD PRESSURE: 162 MMHG

## 2020-05-18 PROCEDURE — 2580000003 HC RX 258: Performed by: SURGERY

## 2020-05-18 PROCEDURE — 6360000002 HC RX W HCPCS: Performed by: ANESTHESIOLOGY

## 2020-05-18 PROCEDURE — 2500000003 HC RX 250 WO HCPCS: Performed by: SURGERY

## 2020-05-18 PROCEDURE — 6360000002 HC RX W HCPCS: Performed by: SURGERY

## 2020-05-18 PROCEDURE — 74300 X-RAY BILE DUCTS/PANCREAS: CPT

## 2020-05-18 PROCEDURE — 3700000000 HC ANESTHESIA ATTENDED CARE: Performed by: SURGERY

## 2020-05-18 PROCEDURE — 47563 LAPARO CHOLECYSTECTOMY/GRAPH: CPT | Performed by: SURGERY

## 2020-05-18 PROCEDURE — C1894 INTRO/SHEATH, NON-LASER: HCPCS | Performed by: SURGERY

## 2020-05-18 PROCEDURE — 3700000001 HC ADD 15 MINUTES (ANESTHESIA): Performed by: SURGERY

## 2020-05-18 PROCEDURE — 6360000004 HC RX CONTRAST MEDICATION: Performed by: SURGERY

## 2020-05-18 PROCEDURE — 6370000000 HC RX 637 (ALT 250 FOR IP): Performed by: ANESTHESIOLOGY

## 2020-05-18 PROCEDURE — 2500000003 HC RX 250 WO HCPCS

## 2020-05-18 PROCEDURE — 7100000000 HC PACU RECOVERY - FIRST 15 MIN: Performed by: SURGERY

## 2020-05-18 PROCEDURE — 6370000000 HC RX 637 (ALT 250 FOR IP)

## 2020-05-18 PROCEDURE — 7100000010 HC PHASE II RECOVERY - FIRST 15 MIN: Performed by: SURGERY

## 2020-05-18 PROCEDURE — 2709999900 HC NON-CHARGEABLE SUPPLY: Performed by: SURGERY

## 2020-05-18 PROCEDURE — 88304 TISSUE EXAM BY PATHOLOGIST: CPT

## 2020-05-18 PROCEDURE — 7100000011 HC PHASE II RECOVERY - ADDTL 15 MIN: Performed by: SURGERY

## 2020-05-18 PROCEDURE — 3600000014 HC SURGERY LEVEL 4 ADDTL 15MIN: Performed by: SURGERY

## 2020-05-18 PROCEDURE — 6360000002 HC RX W HCPCS

## 2020-05-18 PROCEDURE — 7100000001 HC PACU RECOVERY - ADDTL 15 MIN: Performed by: SURGERY

## 2020-05-18 PROCEDURE — 3600000004 HC SURGERY LEVEL 4 BASE: Performed by: SURGERY

## 2020-05-18 RX ORDER — ROCURONIUM BROMIDE 10 MG/ML
INJECTION, SOLUTION INTRAVENOUS PRN
Status: DISCONTINUED | OUTPATIENT
Start: 2020-05-18 | End: 2020-05-18 | Stop reason: SDUPTHER

## 2020-05-18 RX ORDER — OXYCODONE HYDROCHLORIDE AND ACETAMINOPHEN 5; 325 MG/1; MG/1
1 TABLET ORAL ONCE
Status: COMPLETED | OUTPATIENT
Start: 2020-05-18 | End: 2020-05-18

## 2020-05-18 RX ORDER — ALBUTEROL SULFATE 90 UG/1
AEROSOL, METERED RESPIRATORY (INHALATION) PRN
Status: DISCONTINUED | OUTPATIENT
Start: 2020-05-18 | End: 2020-05-18 | Stop reason: SDUPTHER

## 2020-05-18 RX ORDER — SODIUM CHLORIDE 0.9 % (FLUSH) 0.9 %
10 SYRINGE (ML) INJECTION PRN
Status: DISCONTINUED | OUTPATIENT
Start: 2020-05-18 | End: 2020-05-18 | Stop reason: HOSPADM

## 2020-05-18 RX ORDER — GLYCOPYRROLATE 1 MG/5 ML
SYRINGE (ML) INTRAVENOUS PRN
Status: DISCONTINUED | OUTPATIENT
Start: 2020-05-18 | End: 2020-05-18 | Stop reason: SDUPTHER

## 2020-05-18 RX ORDER — FENTANYL CITRATE 50 UG/ML
25 INJECTION, SOLUTION INTRAMUSCULAR; INTRAVENOUS EVERY 5 MIN PRN
Status: DISCONTINUED | OUTPATIENT
Start: 2020-05-18 | End: 2020-05-18 | Stop reason: HOSPADM

## 2020-05-18 RX ORDER — MEPERIDINE HYDROCHLORIDE 25 MG/ML
25 INJECTION INTRAMUSCULAR; INTRAVENOUS; SUBCUTANEOUS EVERY 5 MIN PRN
Status: DISCONTINUED | OUTPATIENT
Start: 2020-05-18 | End: 2020-05-18 | Stop reason: HOSPADM

## 2020-05-18 RX ORDER — ONDANSETRON 2 MG/ML
4 INJECTION INTRAMUSCULAR; INTRAVENOUS
Status: DISCONTINUED | OUTPATIENT
Start: 2020-05-18 | End: 2020-05-18 | Stop reason: HOSPADM

## 2020-05-18 RX ORDER — SODIUM CHLORIDE 0.9 % (FLUSH) 0.9 %
10 SYRINGE (ML) INJECTION EVERY 12 HOURS SCHEDULED
Status: DISCONTINUED | OUTPATIENT
Start: 2020-05-18 | End: 2020-05-18 | Stop reason: HOSPADM

## 2020-05-18 RX ORDER — PROPOFOL 10 MG/ML
INJECTION, EMULSION INTRAVENOUS PRN
Status: DISCONTINUED | OUTPATIENT
Start: 2020-05-18 | End: 2020-05-18 | Stop reason: SDUPTHER

## 2020-05-18 RX ORDER — IBUPROFEN 800 MG/1
800 TABLET ORAL EVERY 6 HOURS PRN
Qty: 90 TABLET | Refills: 1 | Status: SHIPPED | OUTPATIENT
Start: 2020-05-18 | End: 2020-11-18 | Stop reason: SDUPTHER

## 2020-05-18 RX ORDER — MIDAZOLAM HYDROCHLORIDE 1 MG/ML
INJECTION INTRAMUSCULAR; INTRAVENOUS PRN
Status: DISCONTINUED | OUTPATIENT
Start: 2020-05-18 | End: 2020-05-18 | Stop reason: SDUPTHER

## 2020-05-18 RX ORDER — NEOSTIGMINE METHYLSULFATE 1 MG/ML
INJECTION, SOLUTION INTRAVENOUS PRN
Status: DISCONTINUED | OUTPATIENT
Start: 2020-05-18 | End: 2020-05-18 | Stop reason: SDUPTHER

## 2020-05-18 RX ORDER — DEXAMETHASONE SODIUM PHOSPHATE 10 MG/ML
INJECTION, SOLUTION INTRAMUSCULAR; INTRAVENOUS PRN
Status: DISCONTINUED | OUTPATIENT
Start: 2020-05-18 | End: 2020-05-18 | Stop reason: SDUPTHER

## 2020-05-18 RX ORDER — OXYCODONE HYDROCHLORIDE AND ACETAMINOPHEN 5; 325 MG/1; MG/1
1 TABLET ORAL EVERY 6 HOURS PRN
Qty: 18 TABLET | Refills: 0 | Status: SHIPPED | OUTPATIENT
Start: 2020-05-18 | End: 2020-05-23

## 2020-05-18 RX ORDER — DOCUSATE SODIUM 100 MG/1
100 CAPSULE, LIQUID FILLED ORAL 2 TIMES DAILY
Qty: 30 CAPSULE | Refills: 0 | Status: SHIPPED | OUTPATIENT
Start: 2020-05-18 | End: 2020-06-02

## 2020-05-18 RX ORDER — SODIUM CHLORIDE 9 MG/ML
INJECTION, SOLUTION INTRAVENOUS CONTINUOUS
Status: DISCONTINUED | OUTPATIENT
Start: 2020-05-18 | End: 2020-05-18 | Stop reason: HOSPADM

## 2020-05-18 RX ORDER — LIDOCAINE HYDROCHLORIDE 20 MG/ML
INJECTION, SOLUTION INFILTRATION; PERINEURAL PRN
Status: DISCONTINUED | OUTPATIENT
Start: 2020-05-18 | End: 2020-05-18 | Stop reason: SDUPTHER

## 2020-05-18 RX ORDER — BUPIVACAINE HYDROCHLORIDE AND EPINEPHRINE 2.5; 5 MG/ML; UG/ML
INJECTION, SOLUTION EPIDURAL; INFILTRATION; INTRACAUDAL; PERINEURAL PRN
Status: DISCONTINUED | OUTPATIENT
Start: 2020-05-18 | End: 2020-05-18 | Stop reason: ALTCHOICE

## 2020-05-18 RX ORDER — FENTANYL CITRATE 50 UG/ML
50 INJECTION, SOLUTION INTRAMUSCULAR; INTRAVENOUS EVERY 5 MIN PRN
Status: DISCONTINUED | OUTPATIENT
Start: 2020-05-18 | End: 2020-05-18 | Stop reason: HOSPADM

## 2020-05-18 RX ORDER — FENTANYL CITRATE 50 UG/ML
INJECTION, SOLUTION INTRAMUSCULAR; INTRAVENOUS
Status: DISPENSED
Start: 2020-05-18 | End: 2020-05-18

## 2020-05-18 RX ORDER — FENTANYL CITRATE 50 UG/ML
INJECTION, SOLUTION INTRAMUSCULAR; INTRAVENOUS PRN
Status: DISCONTINUED | OUTPATIENT
Start: 2020-05-18 | End: 2020-05-18 | Stop reason: SDUPTHER

## 2020-05-18 RX ORDER — ONDANSETRON 2 MG/ML
INJECTION INTRAMUSCULAR; INTRAVENOUS PRN
Status: DISCONTINUED | OUTPATIENT
Start: 2020-05-18 | End: 2020-05-18 | Stop reason: SDUPTHER

## 2020-05-18 RX ADMIN — Medication 0.6 MG: at 08:32

## 2020-05-18 RX ADMIN — LIDOCAINE HYDROCHLORIDE 80 MG: 20 INJECTION, SOLUTION INFILTRATION; PERINEURAL at 07:50

## 2020-05-18 RX ADMIN — MIDAZOLAM 2 MG: 1 INJECTION INTRAMUSCULAR; INTRAVENOUS at 07:43

## 2020-05-18 RX ADMIN — ONDANSETRON 4 MG: 2 INJECTION INTRAMUSCULAR; INTRAVENOUS at 08:06

## 2020-05-18 RX ADMIN — ALBUTEROL SULFATE 4 PUFF: 90 AEROSOL, METERED RESPIRATORY (INHALATION) at 08:14

## 2020-05-18 RX ADMIN — DEXTROSE MONOHYDRATE 3 G: 50 INJECTION, SOLUTION INTRAVENOUS at 07:56

## 2020-05-18 RX ADMIN — Medication 3 MG: at 08:32

## 2020-05-18 RX ADMIN — FENTANYL CITRATE 100 MCG: 50 INJECTION, SOLUTION INTRAMUSCULAR; INTRAVENOUS at 07:50

## 2020-05-18 RX ADMIN — ROCURONIUM BROMIDE 40 MG: 10 SOLUTION INTRAVENOUS at 07:50

## 2020-05-18 RX ADMIN — FENTANYL CITRATE 50 MCG: 50 INJECTION, SOLUTION INTRAMUSCULAR; INTRAVENOUS at 08:52

## 2020-05-18 RX ADMIN — DEXAMETHASONE SODIUM PHOSPHATE 10 MG: 10 INJECTION, SOLUTION INTRAMUSCULAR; INTRAVENOUS at 07:50

## 2020-05-18 RX ADMIN — OXYCODONE HYDROCHLORIDE AND ACETAMINOPHEN 1 TABLET: 5; 325 TABLET ORAL at 10:47

## 2020-05-18 RX ADMIN — PROPOFOL 200 MG: 10 INJECTION, EMULSION INTRAVENOUS at 07:50

## 2020-05-18 RX ADMIN — SODIUM CHLORIDE: 9 INJECTION, SOLUTION INTRAVENOUS at 07:42

## 2020-05-18 RX ADMIN — FENTANYL CITRATE 50 MCG: 50 INJECTION, SOLUTION INTRAMUSCULAR; INTRAVENOUS at 09:01

## 2020-05-18 ASSESSMENT — PULMONARY FUNCTION TESTS
PIF_VALUE: 25
PIF_VALUE: 40
PIF_VALUE: 35
PIF_VALUE: 31
PIF_VALUE: 11
PIF_VALUE: 35
PIF_VALUE: 29
PIF_VALUE: 35
PIF_VALUE: 12
PIF_VALUE: 1
PIF_VALUE: 35
PIF_VALUE: 4
PIF_VALUE: 35
PIF_VALUE: 28
PIF_VALUE: 8
PIF_VALUE: 28
PIF_VALUE: 33
PIF_VALUE: 35
PIF_VALUE: 35
PIF_VALUE: 0
PIF_VALUE: 35
PIF_VALUE: 1
PIF_VALUE: 35
PIF_VALUE: 2
PIF_VALUE: 37
PIF_VALUE: 35
PIF_VALUE: 35
PIF_VALUE: 28
PIF_VALUE: 35
PIF_VALUE: 28
PIF_VALUE: 35
PIF_VALUE: 36
PIF_VALUE: 28
PIF_VALUE: 35
PIF_VALUE: 35
PIF_VALUE: 1
PIF_VALUE: 9
PIF_VALUE: 35
PIF_VALUE: 35
PIF_VALUE: 30
PIF_VALUE: 2
PIF_VALUE: 36

## 2020-05-18 ASSESSMENT — PAIN DESCRIPTION - ONSET: ONSET: GRADUAL

## 2020-05-18 ASSESSMENT — PAIN SCALES - GENERAL
PAINLEVEL_OUTOF10: 5
PAINLEVEL_OUTOF10: 7
PAINLEVEL_OUTOF10: 6
PAINLEVEL_OUTOF10: 6
PAINLEVEL_OUTOF10: 4
PAINLEVEL_OUTOF10: 8
PAINLEVEL_OUTOF10: 3

## 2020-05-18 ASSESSMENT — PAIN DESCRIPTION - DESCRIPTORS
DESCRIPTORS: ACHING
DESCRIPTORS: ACHING;PRESSURE
DESCRIPTORS: ACHING;DISCOMFORT
DESCRIPTORS: DISCOMFORT

## 2020-05-18 ASSESSMENT — PAIN - FUNCTIONAL ASSESSMENT: PAIN_FUNCTIONAL_ASSESSMENT: 0-10

## 2020-05-18 ASSESSMENT — PAIN DESCRIPTION - PAIN TYPE
TYPE: SURGICAL PAIN

## 2020-05-18 ASSESSMENT — PAIN DESCRIPTION - FREQUENCY
FREQUENCY: CONTINUOUS
FREQUENCY: CONTINUOUS

## 2020-05-18 ASSESSMENT — PAIN DESCRIPTION - LOCATION
LOCATION: ABDOMEN

## 2020-05-18 ASSESSMENT — PAIN DESCRIPTION - ORIENTATION: ORIENTATION: RIGHT

## 2020-05-18 NOTE — ANESTHESIA PRE PROCEDURE
Current medications:    Current Facility-Administered Medications   Medication Dose Route Frequency Provider Last Rate Last Dose    0.9 % sodium chloride infusion   Intravenous Continuous Megan Valdes MD        ceFAZolin (ANCEF) 3 g in dextrose 5 % 100 mL IVPB  3 g Intravenous On Call to Lisa Kwong MD        sodium chloride flush 0.9 % injection 10 mL  10 mL Intravenous 2 times per day Megan Valdes MD        sodium chloride flush 0.9 % injection 10 mL  10 mL Intravenous PRN Megan Valdes MD           Allergies:  No Known Allergies    Problem List:    Patient Active Problem List   Diagnosis Code    Hypertension I10    Depressed F32.9    Mixed hyperlipidemia E78.2    Fatigue R53.83    Chronic pain of both knees M25.561, M25.562, G89.29    Vitamin D deficiency E55.9    Urinary tract infection with hematuria N39.0, R31.9    Elevated hemoglobin A1c R73.09    Chest pain R07.9    Elevated liver enzymes R74.8    Osteoarthritis M19.90    Acquired hypothyroidism E03.9    Morbid obesity ([address] E66.01       Past Medical History:        Diagnosis Date    History of blood transfusion 5589    at [facility] complication after kidney stone surgery    History of kidney stones     Hx of cardiovascular stress test 12/17/2019    Lexiscan stress test    Hypertension     Thyroid disease        Past Surgical History:        Procedure Laterality Date    HERNIA REPAIR      mesh    HYSTERECTOMY      JOINT REPLACEMENT Right 2019    knee  [facility]    KIDNEY STONE SURGERY      incisional removal       Social History:    Social History     Tobacco Use    Smoking status: Never Smoker    Smokeless tobacco: Never Used   Substance Use Topics    Alcohol use: Not Currently                                Counseling given: Not Answered      Vital Signs (Current):   Vitals:    05/18/20 0655   BP: (!) 159/86   Pulse: 68   Resp: 16   Temp: 95 °F (35 °C)   TempSrc: Oral   SpO2: 95%   Weight: 280 lb 6 oz (127.2 hypertension:, hyperlipidemia      ECG reviewed  Rhythm: regular  Rate: normal  Echocardiogram reviewed         Beta Blocker:  Dose within 24 Hrs         Neuro/Psych:   (+) psychiatric history:            GI/Hepatic/Renal:   (+) renal disease: kidney stones, morbid obesity          Endo/Other:    (+) DiabetesType II DM, , hypothyroidism: arthritis:., .                 Abdominal:   (+) obese,     Abdomen: soft. Vascular:                                        Anesthesia Plan      general     ASA 3       Induction: intravenous. Anesthetic plan and risks discussed with patient. Plan discussed with CRNA.                   Jose J Burleson MD   5/18/2020

## 2020-05-18 NOTE — H&P
negative obtundation, AMS  ENT ROS: negative rhinorrhea, epistaxis  Allergy and Immunology ROS: negative itchy/watery eyes or nasal congestion  Hematological and Lymphatic ROS: negative spontaneous bleeding or bruising  Endocrine ROS: negative  lethargy, mood swings, palpitations or polydipsia/polyuria  Respiratory ROS: negative sputum changes, stridor, tachypnea or wheezing  Cardiovascular ROS: negative for - loss of consciousness, murmur or orthopnea  Gastrointestinal ROS: negative for - hematochezia or hematemesis  Genito-Urinary ROS: negative for -  genital discharge or hematuria  Musculoskeletal ROS: negative for - focal weakness, gangrene  Psych/Neuro ROS: negative for - visual or auditory hallucinations, suicidal ideation    Physical exam:   BP (!) 159/86   Pulse 68   Temp 95 °F (35 °C) (Oral)   Resp 16   Ht 5' 3\" (1.6 m)   Wt 280 lb 6 oz (127.2 kg)   LMP  (LMP Unknown)   SpO2 95%   BMI 49.67 kg/m²   General appearance:  NAD, appears stated age  Head: NCAT, PERRLA, EOMI, red conjunctiva  Neck: supple, no masses, trachea midline  Lungs: Equal chest rise bilateral, no retractions, no wheezing  Heart: Reg rate  Abdomen: soft, obese  Skin; warm and dry, no cyanosis  Gu: no cva tenderness  Extremities: atraumatic, no focal motor deficits, no open wounds  Psych: No tremor, visual hallucinations        Radiology: I reviewed relevant abdominal imaging from this admission and that available in the EMR including RUQ US. My assessment is gallstones, fatty liver disease      Assessment:  62 y.o. female with symptomatic cholelithiasis    Plan: To OR for laparoscopic possible robotic cholecystectomy with intraoperative cholangiogram  Discussed risks of injury to liver, common bile duct, hepatic duct, surrounding vascular structures, small bowel, stomach. Risk for further surgery to correct complications.   Plan for laparoscopic, possible open cholecystectomy with possible intraoperative cholangiogram. Patient agrees and all questions answered to their and family's satisfaction    The patient was counseled at length about the risks of andi Covid-19 during their perioperative period and any recovery window from their procedure. The patient was made aware that andi Covid-19  may worsen their prognosis for recovering from their procedure  and lend to a higher morbidity and/or mortality risk. All material risks, benefits, and reasonable alternatives including postponing the procedure were discussed. The patient does wish to proceed with the procedure at this time.         Lian Ba MD  7:39 AM  5/18/2020

## 2020-06-03 ENCOUNTER — VIRTUAL VISIT (OUTPATIENT)
Dept: SURGERY | Age: 60
End: 2020-06-03

## 2020-06-03 PROCEDURE — 99024 POSTOP FOLLOW-UP VISIT: CPT | Performed by: SURGERY

## 2020-06-13 ENCOUNTER — PATIENT MESSAGE (OUTPATIENT)
Dept: FAMILY MEDICINE CLINIC | Age: 60
End: 2020-06-13

## 2020-06-15 RX ORDER — ERGOCALCIFEROL 1.25 MG/1
50000 CAPSULE ORAL WEEKLY
Qty: 4 CAPSULE | Refills: 0 | Status: SHIPPED
Start: 2020-06-15 | End: 2021-01-27 | Stop reason: CLARIF

## 2020-06-15 NOTE — TELEPHONE ENCOUNTER
From: Chen Mir  To: Denny Kehr, DO  Sent: 6/13/2020 11:37 AM EDT  Subject: Non-Urgent Medical Question    I'm in need of a shower chair prescription I'm having back pain and having trouble standing in shower  Thank you

## 2020-06-30 RX ORDER — LISINOPRIL 40 MG/1
TABLET ORAL
Qty: 90 TABLET | Refills: 1 | Status: SHIPPED
Start: 2020-06-30 | End: 2021-01-04 | Stop reason: SDUPTHER

## 2020-06-30 RX ORDER — METOPROLOL SUCCINATE 100 MG/1
TABLET, EXTENDED RELEASE ORAL
Qty: 90 TABLET | Refills: 1 | Status: SHIPPED
Start: 2020-06-30 | End: 2021-01-04 | Stop reason: SDUPTHER

## 2020-07-09 ENCOUNTER — VIRTUAL VISIT (OUTPATIENT)
Dept: FAMILY MEDICINE CLINIC | Age: 60
End: 2020-07-09
Payer: MEDICAID

## 2020-07-09 PROCEDURE — G8427 DOCREV CUR MEDS BY ELIG CLIN: HCPCS | Performed by: FAMILY MEDICINE

## 2020-07-09 PROCEDURE — G8417 CALC BMI ABV UP PARAM F/U: HCPCS | Performed by: FAMILY MEDICINE

## 2020-07-09 PROCEDURE — 3017F COLORECTAL CA SCREEN DOC REV: CPT | Performed by: FAMILY MEDICINE

## 2020-07-09 PROCEDURE — 1036F TOBACCO NON-USER: CPT | Performed by: FAMILY MEDICINE

## 2020-07-09 PROCEDURE — 99214 OFFICE O/P EST MOD 30 MIN: CPT | Performed by: FAMILY MEDICINE

## 2020-07-09 RX ORDER — HYDROCHLOROTHIAZIDE 25 MG/1
25 TABLET ORAL DAILY
Qty: 90 TABLET | Refills: 1 | Status: SHIPPED
Start: 2020-07-09 | End: 2021-01-26

## 2020-07-09 RX ORDER — CITALOPRAM 20 MG/1
20 TABLET ORAL DAILY
Qty: 90 TABLET | Refills: 1 | Status: SHIPPED
Start: 2020-07-09 | End: 2021-01-26

## 2020-07-09 RX ORDER — LEVOTHYROXINE SODIUM 0.07 MG/1
75 TABLET ORAL DAILY
Qty: 90 TABLET | Refills: 1 | Status: SHIPPED
Start: 2020-07-09 | End: 2021-01-26

## 2020-07-09 NOTE — PROGRESS NOTES
TeleMedicine Video Visit    This visit was performed as a virtual video visit using a synchronous, two-way, audio-video telehealth technology platform. Patient identification was verified at the start of the visit, including the patient's telephone number and physical location. I discussed with the patient the nature of our telehealth visits, that:     1. Due to the nature of an audio- video modality, the only components of a physical exam that could be done are the elements supported by direct observation. 2. I would evaluate the patient and recommend diagnostics and treatments based on my assessment. 3. If it was felt that the patient should be evaluated in clinic or an emergency room setting, then they would be directed there. 4. Our sessions are not being recorded and that personal health information is protected. 5. Our team would provide follow up care in person if/when the patient needs it. Patient does agree to proceed with telemedicine consultation. Patient's location: home address in [facility]  Physician  location other address in [facility] other people involved in call none. Time spent: Greater than Not billed by time    This visit was completed virtually using [employer]. me      Family Medicine Outpatient        SUBJECTIVE:  CC: had concerns including Blood Sugar Problem (Patient has been noticing high blood sugars (412, 345, 191)) and Other (Vitamin D deficiency; she was only given 4 capsules of Vit D 27790 units;  please advise if we should refill?). HPI:Sharla Mir presented to the clinic for a routine visit. Courtney Chaparro is a 62year old female presenting for a virtual visit today. She notes that she wasn't feeling well the other night. She describes her symptoms as feeling hot in the face and having heaviness in her head. She denies any weakness, sick contacts, f/c, n/v. She last ate [employer] Hot dogs and a  cherry pie. Her last a1c was 6.1% 12/2019.  She reports when she checked her sugars last night her glucose was 412, then 345, and when she checked it this morning it was 191 mg/dl. She notes her blood pressure has been good. Review of Systems   Constitutional: Negative for appetite change, fatigue and fever. HENT: Negative for congestion, rhinorrhea and sneezing. Eyes: Negative for photophobia and visual disturbance. Respiratory: Negative for cough, shortness of breath and wheezing. Cardiovascular: Negative for chest pain and palpitations. Gastrointestinal: Negative for abdominal pain, constipation, diarrhea, nausea and vomiting. Endocrine: Positive for heat intolerance. Negative for polyuria. Neurological: Negative for dizziness, syncope, weakness, light-headedness and headaches. Outpatient Medications Marked as Taking for the 7/9/20 encounter (Virtual Visit) with Meghan Majano MD   Medication Sig Dispense Refill    hydroCHLOROthiazide (HYDRODIURIL) 25 MG tablet Take 1 tablet by mouth daily 90 tablet 1    levothyroxine (SYNTHROID) 75 MCG tablet Take 1 tablet by mouth Daily 90 tablet 1    citalopram (CELEXA) 20 MG tablet Take 1 tablet by mouth daily 90 tablet 1    lisinopril (PRINIVIL;ZESTRIL) 40 MG tablet take 1 tablet by mouth once daily 90 tablet 1    metoprolol succinate (TOPROL XL) 100 MG extended release tablet take 1 tablet by mouth once daily 90 tablet 1    vitamin D (ERGOCALCIFEROL) 1.25 MG (50377 UT) CAPS capsule Take 1 capsule by mouth once a week 4 capsule 0    ibuprofen (IBU) 800 MG tablet Take 1 tablet by mouth every 6 hours as needed for Pain 90 tablet 1    potassium citrate (UROCIT-K) 10 MEQ (1080 MG) extended release tablet Take 1 tablet by mouth 2 times daily  0    Lancets MISC Test 2 times a day & as needed for symptoms of irregular blood glucose. 200 each 3    blood glucose monitor strips Test 2 times a day & as needed for symptoms of irregular blood glucose.  200 strip 3    glucose monitoring kit (FREESTYLE) monitoring kit Test 2 times a day & as needed for symptoms of irregular blood glucose. 1 kit 0    allopurinol (ZYLOPRIM) 100 MG tablet Take 200 mg by mouth          I have reviewed all pertinent PMHx, PSHx, FamHx, SocialHx, medications, and allergies and updated history as appropriate. OBJECTIVE    VS: LMP  (LMP Unknown)   Physical Exam  Constitutional:       General: She is not in acute distress. Appearance: Normal appearance. She is obese. She is not ill-appearing. Neurological:      Mental Status: She is oriented to person, place, and time. Psychiatric:         Mood and Affect: Mood normal.         Behavior: Behavior normal.         ASSESSMENT/PLAN:  1. Prediabetes  - Basic Metabolic Panel; Future  - Hemoglobin A1C; Future    2. Hypothyroidism, unspecified type  - levothyroxine (SYNTHROID) 75 MCG tablet; Take 1 tablet by mouth Daily  Dispense: 90 tablet; Refill: 1  - TSH without Reflex; Future    3. Essential hypertension  - hydroCHLOROthiazide (HYDRODIURIL) 25 MG tablet; Take 1 tablet by mouth daily  Dispense: 90 tablet; Refill: 1    4. Depression, unspecified depression type  - citalopram (CELEXA) 20 MG tablet; Take 1 tablet by mouth daily  Dispense: 90 tablet; Refill: 1    5. Vitamin D deficiency  - Vitamin D 25 Hydroxy; Future      I have reviewed my findings and recommendations with Nico Rodrigeuz MD  7/15/2020 2:34 PM     Counseled regarding above diagnosis, including possible risks and complications, especially if left uncontrolled. Patient counseled on red flag symptoms and if they occur to go to the ED. Discussed medications risk/benefits and possible side effects and alternatives to treatment. Patient and/or guardian verbalizes understanding, agrees, feels comfortable with and wishes to proceed with above treatment plan.       Advised patient regarding importance of keeping up with recommended health maintenance and to schedule as soon as possible if overdue, as this is important in assessing for undiagnosed pathology, especially cancer, as well as protecting against potentially harmful/life threatening disease. Patient and/or guardian verbalizes understanding and agrees with above counseling, assessment and plan. All questions answered. Please note this report has been partially produced using speech recognition software  and may contain errors related to that system including grammar, punctuation and spelling as well as words and phrases that may seem inappropriate. If there are questions or concerns please feel free to contact me to clarify.

## 2020-07-09 NOTE — PATIENT INSTRUCTIONS
Patient Education        Learning About Diabetes Food Guidelines  Your Care Instructions     Meal planning is important to manage diabetes. It helps keep your blood sugar at a target level (which you set with your doctor). You don't have to eat special foods. You can eat what your family eats, including sweets once in a while. But you do have to pay attention to how often you eat and how much you eat of certain foods. You may want to work with a dietitian or a certified diabetes educator (CDE) to help you plan meals and snacks. A dietitian or CDE can also help you lose weight if that is one of your goals. What should you know about eating carbs? Managing the amount of carbohydrate (carbs) you eat is an important part of healthy meals when you have diabetes. Carbohydrate is found in many foods. · Learn which foods have carbs. And learn the amounts of carbs in different foods. ? Bread, cereal, pasta, and rice have about 15 grams of carbs in a serving. A serving is 1 slice of bread (1 ounce), ½ cup of cooked cereal, or 1/3 cup of cooked pasta or rice. ? Fruits have 15 grams of carbs in a serving. A serving is 1 small fresh fruit, such as an apple or orange; ½ of a banana; ½ cup of cooked or canned fruit; ½ cup of fruit juice; 1 cup of melon or raspberries; or 2 tablespoons of dried fruit. ? Milk and no-sugar-added yogurt have 15 grams of carbs in a serving. A serving is 1 cup of milk or 2/3 cup of no-sugar-added yogurt. ? Starchy vegetables have 15 grams of carbs in a serving. A serving is ½ cup of mashed potatoes or sweet potato; 1 cup winter squash; ½ of a small baked potato; ½ cup of cooked beans; or ½ cup cooked corn or green peas. · Learn how much carbs to eat each day and at each meal. A dietitian or CDE can teach you how to keep track of the amount of carbs you eat. This is called carbohydrate counting. · If you are not sure how to count carbohydrate grams, use the Plate Method to plan meals.  It is a good, quick way to make sure that you have a balanced meal. It also helps you spread carbs throughout the day. ? Divide your plate by types of foods. Put non-starchy vegetables on half the plate, meat or other protein food on one-quarter of the plate, and a grain or starchy vegetable in the final quarter of the plate. To this you can add a small piece of fruit and 1 cup of milk or yogurt, depending on how many carbs you are supposed to eat at a meal.  · Try to eat about the same amount of carbs at each meal. Do not \"save up\" your daily allowance of carbs to eat at one meal.  · Proteins have very little or no carbs per serving. Examples of proteins are beef, chicken, turkey, fish, eggs, tofu, cheese, cottage cheese, and peanut butter. A serving size of meat is 3 ounces, which is about the size of a deck of cards. Examples of meat substitute serving sizes (equal to 1 ounce of meat) are 1/4 cup of cottage cheese, 1 egg, 1 tablespoon of peanut butter, and ½ cup of tofu. How can you eat out and still eat healthy? · Learn to estimate the serving sizes of foods that have carbohydrate. If you measure food at home, it will be easier to estimate the amount in a serving of restaurant food. · If the meal you order has too much carbohydrate (such as potatoes, corn, or baked beans), ask to have a low-carbohydrate food instead. Ask for a salad or green vegetables. · If you use insulin, check your blood sugar before and after eating out to help you plan how much to eat in the future. · If you eat more carbohydrate at a meal than you had planned, take a walk or do other exercise. This will help lower your blood sugar. What else should you know? · Limit saturated fat, such as the fat from meat and dairy products. This is a healthy choice because people who have diabetes are at higher risk of heart disease. So choose lean cuts of meat and nonfat or low-fat dairy products.  Use olive or canola oil instead of butter or shortening when cooking. · Don't skip meals. Your blood sugar may drop too low if you skip meals and take insulin or certain medicines for diabetes. · Check with your doctor before you drink alcohol. Alcohol can cause your blood sugar to drop too low. Alcohol can also cause a bad reaction if you take certain diabetes medicines. Follow-up care is a key part of your treatment and safety. Be sure to make and go to all appointments, and call your doctor if you are having problems. It's also a good idea to know your test results and keep a list of the medicines you take. Where can you learn more? Go to https://[employer]. org and sign in to your [employer] account. Enter T821 in the [employer] box to learn more about \"Learning About Diabetes Food Guidelines. \"     If you do not have an account, please click on the \"Sign Up Now\" link. Current as of: December 20, 2019               Content Version: 12.5  © 5803-7413 [employer]wise, [employer]. Care instructions adapted under license by [facility] ([facility]). If you have questions about a medical condition or this instruction, always ask your healthcare professional. [address] any warranty or liability for your use of this information.

## 2020-07-15 ENCOUNTER — HOSPITAL ENCOUNTER (OUTPATIENT)
Age: 60
Discharge: HOME | End: 2020-07-15
Payer: MEDICAID

## 2020-07-15 LAB
ANION GAP SERPL CALCULATED.3IONS-SCNC: 12 MMOL/L (ref 7–16)
BUN BLDV-MCNC: 21 MG/DL (ref 8–23)
CALCIUM SERPL-MCNC: 10 MG/DL (ref 8.6–10.2)
CHLORIDE BLD-SCNC: 99 MMOL/L (ref 98–107)
CO2: 27 MMOL/L (ref 22–29)
CREAT SERPL-MCNC: 0.9 MG/DL (ref 0.5–1)
GFR AFRICAN AMERICAN: >60
GFR NON-AFRICAN AMERICAN: >60 ML/MIN/1.73
GLUCOSE BLD-MCNC: 158 MG/DL (ref 74–99)
HBA1C MFR BLD: 8 % (ref 4–5.6)
POTASSIUM SERPL-SCNC: 4.4 MMOL/L (ref 3.5–5)
SODIUM BLD-SCNC: 138 MMOL/L (ref 132–146)
TSH SERPL DL<=0.05 MIU/L-ACNC: 3.84 UIU/ML (ref 0.27–4.2)
VITAMIN D 25-HYDROXY: 29 NG/ML (ref 30–100)

## 2020-07-15 PROCEDURE — 80048 BASIC METABOLIC PNL TOTAL CA: CPT

## 2020-07-15 PROCEDURE — 36415 COLL VENOUS BLD VENIPUNCTURE: CPT

## 2020-07-15 PROCEDURE — 84443 ASSAY THYROID STIM HORMONE: CPT

## 2020-07-15 PROCEDURE — 82306 VITAMIN D 25 HYDROXY: CPT

## 2020-07-15 PROCEDURE — 83036 HEMOGLOBIN GLYCOSYLATED A1C: CPT

## 2020-07-15 ASSESSMENT — ENCOUNTER SYMPTOMS
VOMITING: 0
RHINORRHEA: 0
PHOTOPHOBIA: 0
ABDOMINAL PAIN: 0
SHORTNESS OF BREATH: 0
NAUSEA: 0
DIARRHEA: 0
WHEEZING: 0
CONSTIPATION: 0
COUGH: 0

## 2020-08-28 ENCOUNTER — TELEPHONE (OUTPATIENT)
Dept: ADMINISTRATIVE | Age: 60
End: 2020-08-28

## 2020-11-02 ENCOUNTER — HOSPITAL ENCOUNTER (EMERGENCY)
Age: 60
Discharge: HOME | End: 2020-11-02
Payer: MEDICAID

## 2020-11-02 ENCOUNTER — APPOINTMENT (OUTPATIENT)
Dept: GENERAL RADIOLOGY | Age: 60
End: 2020-11-02
Payer: MEDICAID

## 2020-11-02 ENCOUNTER — NURSE TRIAGE (OUTPATIENT)
Dept: OTHER | Facility: CLINIC | Age: 60
End: 2020-11-02

## 2020-11-02 ENCOUNTER — TELEPHONE (OUTPATIENT)
Dept: ADMINISTRATIVE | Age: 60
End: 2020-11-02

## 2020-11-02 VITALS
HEART RATE: 78 BPM | SYSTOLIC BLOOD PRESSURE: 180 MMHG | HEIGHT: 63 IN | BODY MASS INDEX: 49.61 KG/M2 | RESPIRATION RATE: 20 BRPM | TEMPERATURE: 97.6 F | DIASTOLIC BLOOD PRESSURE: 80 MMHG | WEIGHT: 280 LBS | OXYGEN SATURATION: 97 %

## 2020-11-02 LAB
BACTERIA: ABNORMAL /HPF
BILIRUBIN URINE: NEGATIVE
BLOOD, URINE: ABNORMAL
CLARITY: ABNORMAL
COLOR: YELLOW
EPITHELIAL CELLS, UA: ABNORMAL /HPF
GLUCOSE URINE: 250 MG/DL
KETONES, URINE: NEGATIVE MG/DL
LEUKOCYTE ESTERASE, URINE: ABNORMAL
NITRITE, URINE: NEGATIVE
PH UA: 5.5 (ref 5–9)
PROTEIN UA: ABNORMAL MG/DL
RBC UA: ABNORMAL /HPF (ref 0–2)
SPECIFIC GRAVITY UA: 1.02 (ref 1–1.03)
UROBILINOGEN, URINE: 0.2 E.U./DL
WBC UA: ABNORMAL /HPF (ref 0–5)

## 2020-11-02 PROCEDURE — 87077 CULTURE AEROBIC IDENTIFY: CPT

## 2020-11-02 PROCEDURE — 87186 SC STD MICRODIL/AGAR DIL: CPT

## 2020-11-02 PROCEDURE — 71046 X-RAY EXAM CHEST 2 VIEWS: CPT

## 2020-11-02 PROCEDURE — 99212 OFFICE O/P EST SF 10 MIN: CPT

## 2020-11-02 PROCEDURE — 87088 URINE BACTERIA CULTURE: CPT

## 2020-11-02 PROCEDURE — 81001 URINALYSIS AUTO W/SCOPE: CPT

## 2020-11-02 RX ORDER — CEFDINIR 300 MG/1
300 CAPSULE ORAL 2 TIMES DAILY
Qty: 20 CAPSULE | Refills: 0 | Status: SHIPPED | OUTPATIENT
Start: 2020-11-02 | End: 2020-11-12

## 2020-11-02 NOTE — TELEPHONE ENCOUNTER
Pt called to request an appointment w/ Dr Rees Samples for a possible UTI and irregular heart beat, per red flag word protocol transferred to Nurse Triage, Elton Neley RN took call.

## 2020-11-02 NOTE — ED PROVIDER NOTES
[facility]                                                                                                                                      Department of Emergency Medicine   ED  Provider Note  Admit Date/RoomTime: 11/2/2020  4:54 PM  ED Room: 02/02        HPI:  11/2/20,   Time: 5:21 PM INDIO Villanueva is a 62 y.o. female presenting to the ED for SOB and urinary symptoms, beginning few weeks ago. The complaint has been intermittent, moderate in severity, and worsened by nothing. The patient presents to the emergency room today because she was sent here by her PCP. She states that she is not able to get into his office until Friday. She has had intermittent blood in her urine as well as some mild frequency and low back pain. There is a small amount of suprapubic discomfort as well. The patient denies any fever, chills, or flank pain. She does have some shortness of breath with exertion. She states that she has had this issue off and on in the past.  Her son tells her it is because of her weight and that she is \"out of shape\" she states that she does not feel ill. Denies any cough or chest pain. She does report some \"irregular heartbeat\" which she states was worked up in the past and felt to be secondary to her thyroid disease. The patient does not have any chest pain at this time. She states the shortness of breath is only with exertion. She states that last year when she had these similar symptoms they admitted her to the hospital and told her that her heart was just fine. There is no lower extremity edema is reported. No recent surgery or travel.   She had her gallbladder out in May        ROS:     Constitutional: Negative for fever and chills  HENT: Negative for ear pain, sore throat and sinus pressure  Eyes: Negative for pain, discharge and redness  Respiratory:  See HPI  Cardiovascular: See HPI  Gastrointestinal: Negative for nausea, vomiting, diarrhea and abdominal distention  Genitourinary: See HPI  Musculoskeletal: Negative for back pain and arthralgia  Skin: Negative for rash and wound  Neurological: Negative for weakness and headaches  Hematological: Negative for adenopathy    All other systems reviewed and are negative      -------------------------------- PAST HISTORY ----------------------------------  Past Medical History:  has a past medical history of History of blood transfusion, History of kidney stones, Hx of cardiovascular stress test, Hypertension, and Thyroid disease. Past Surgical History:  has a past surgical history that includes Hysterectomy; joint replacement (Right, 2019); Kidney stone surgery; hernia repair; and Cholecystectomy, laparoscopic (N/A, 5/18/2020). Social History:  reports that she has never smoked. She has never used smokeless tobacco. She reports previous alcohol use. She reports that she does not use drugs. Family History: family history includes COPD in her father; Cancer in her mother. The patients home medications have been reviewed. Allergies: Patient has no known allergies.     --------------------------------- RESULTS ------------------------------------------  All laboratory and radiology results have been personally reviewed by myself   LABS:  Results for orders placed or performed during the hospital encounter of 11/02/20   Urinalysis   Result Value Ref Range    Color, UA Yellow Straw/Yellow    Clarity, UA SL CLOUDY Clear    Glucose, Ur 250 (A) Negative mg/dL    Bilirubin Urine Negative Negative    Ketones, Urine Negative Negative mg/dL    Specific Gravity, UA 1.025 1.005 - 1.030    Blood, Urine TRACE (A) Negative    pH, UA 5.5 5.0 - 9.0    Protein, UA TRACE Negative mg/dL    Urobilinogen, Urine 0.2 <2.0 E.U./dL    Nitrite, Urine Negative Negative    Leukocyte Esterase, Urine SMALL (A) Negative   Microscopic Urinalysis   Result Value Ref Range    WBC, UA 5-10 (A) 0 - 5 /HPF    RBC, UA 0-1 0 - 2 /HPF    Epithelial Cells, UA FEW /HPF    Bacteria, UA RARE (A) None Seen /HPF       RADIOLOGY:  Interpreted by Radiologist.  XR CHEST (2 VW)   Final Result   No pneumonia or pleural effusion.             ----------------- NURSING NOTES AND VITALS REVIEWED ---------------   The nursing notes within the ED encounter and vital signs as below have been reviewed. BP (!) 180/80 Comment: Large Adult Cuff  Pulse 78   Temp 97.6 °F (36.4 °C) (Oral)   Resp 20   Ht 5' 3\" (1.6 m)   Wt 280 lb (127 kg)   LMP  (LMP Unknown)   SpO2 97%   BMI 49.60 kg/m²   Oxygen Saturation Interpretation: Normal      --------------------------------PHYSICAL EXAM------------------------------------      Constitutional/General: Alert and oriented x3, well appearing, non toxic in NAD  Head: NC/AT  Eyes: PERRL, EOMI  Mouth: Oropharynx clear, handling secretions, no trismus  Neck: Supple, full ROM, no meningeal signs  Pulmonary: Lungs decreased but clear to auscultation bilaterally, no wheezes, rales, or rhonchi. Not in respiratory distress  Cardiovascular:  Regular rate and rhythm, no murmurs, gallops, or rubs. 2+ distal pulses  Abdomen: Soft, + BS. No distension. Nontender. No palpable rigidity, rebound or guarding  Extremities: Moves all extremities x 4. Warm and well perfused  Skin: warm and dry without rash  Neurologic: GCS 15,  Intact. No focal deficits  Psych: Normal Affect      ------------------------ ED COURSE/MEDICAL DECISION MAKING----------------------  Medications - No data to display      Medical Decision Making:    I told the patient that a complete work-up for her shortness of breath or irregular heartbeat could not be done here at urgent care. She would likely require an EKG or other labs including troponin were BNP. The patient reports that her symptoms have been going on for over a month and that she was worked up for very similar symptoms last year. She wants me to do what I can hear at urgent care for her.   Chest x-ray is

## 2020-11-02 NOTE — TELEPHONE ENCOUNTER
Per nurse advised patient urgent care for UTI, and blood in urine over a month now. Patient is also scheduled the next available with Dr Ambar Oconnell Friday 11/06.

## 2020-11-02 NOTE — TELEPHONE ENCOUNTER
Possible UTI, Blood urine, frequency, back pain both sides. Pain 0/10 now, comes and goes, worst 9/10. Symptoms started a month ago. Ibuprofen helps with pain. Irregular heartbeat, unsure of heart rate. Comes and goes. Being seen for thyroid issues possibly causing. Reason for Disposition   Side (flank) or lower back pain present    Answer Assessment - Initial Assessment Questions  1. SYMPTOM: \"What's the main symptom you're concerned about? \" (e.g., frequency, incontinence)        See above    2. ONSET: \"When did the  symptoms  start? \"        See above    3. PAIN: \"Is there any pain? \" If so, ask: \"How bad is it? \" (Scale: 1-10; mild, moderate, severe)        See above    4. CAUSE: \"What do you think is causing the symptoms? \"        UTI    5. OTHER SYMPTOMS: \"Do you have any other symptoms? \" (e.g., fever, flank pain, blood in urine, pain with urination)        See above    6. PREGNANCY: \"Is there any chance you are pregnant? \" \"When was your last menstrual period? \"        See above    Protocols used: URINARY SYMPTOMS-ADULT-[place]    Caller provided care advice and instructed to call back with worsening symptoms. Warm transfer to Araseli Rivera at [facility]. Attention Provider: Thank you for allowing me to participate in the care of your patient. The patient was connected to triage in response to information provided to the [facility]. Please do not respond through this encounter as the response is not directed to a shared pool.

## 2020-11-03 NOTE — TELEPHONE ENCOUNTER
Attempted x 2 to reach patient. Voicemail left to return call to the office for instructions.   Electronically signed by Dorothea Corado on 11/3/2020 at 9:13 AM

## 2020-11-05 LAB
ORGANISM: ABNORMAL
URINE CULTURE, ROUTINE: ABNORMAL
URINE CULTURE, ROUTINE: ABNORMAL

## 2020-11-06 ENCOUNTER — VIRTUAL VISIT (OUTPATIENT)
Dept: FAMILY MEDICINE CLINIC | Age: 60
End: 2020-11-06
Payer: MEDICAID

## 2020-11-06 PROCEDURE — 99214 OFFICE O/P EST MOD 30 MIN: CPT | Performed by: FAMILY MEDICINE

## 2020-11-06 PROCEDURE — G8427 DOCREV CUR MEDS BY ELIG CLIN: HCPCS | Performed by: FAMILY MEDICINE

## 2020-11-06 PROCEDURE — 1036F TOBACCO NON-USER: CPT | Performed by: FAMILY MEDICINE

## 2020-11-06 PROCEDURE — G8484 FLU IMMUNIZE NO ADMIN: HCPCS | Performed by: FAMILY MEDICINE

## 2020-11-06 PROCEDURE — G8417 CALC BMI ABV UP PARAM F/U: HCPCS | Performed by: FAMILY MEDICINE

## 2020-11-06 PROCEDURE — 3017F COLORECTAL CA SCREEN DOC REV: CPT | Performed by: FAMILY MEDICINE

## 2020-11-06 RX ORDER — IBUPROFEN 800 MG/1
800 TABLET ORAL EVERY 8 HOURS PRN
Qty: 90 TABLET | Refills: 0 | Status: CANCELLED | OUTPATIENT
Start: 2020-11-06 | End: 2020-12-06

## 2020-11-10 NOTE — TELEPHONE ENCOUNTER
Pt called requesting refill of . Please advise.     Electronically signed by Viv Villarreal MA on 11/10/20 at 3:45 PM EST

## 2020-11-11 RX ORDER — IBUPROFEN 800 MG/1
800 TABLET ORAL EVERY 8 HOURS PRN
OUTPATIENT
Start: 2020-11-11

## 2020-11-12 PROBLEM — R00.2 PALPITATIONS: Status: ACTIVE | Noted: 2020-11-12

## 2020-11-12 PROBLEM — R06.09 DYSPNEA ON EXERTION: Status: ACTIVE | Noted: 2020-11-12

## 2020-11-12 PROBLEM — M25.50 ARTHRALGIA: Status: ACTIVE | Noted: 2020-11-12

## 2020-11-12 ASSESSMENT — ENCOUNTER SYMPTOMS
EYES NEGATIVE: 1
CONSTIPATION: 0
SINUS PAIN: 0
EYE REDNESS: 0
CHOKING: 0
ABDOMINAL PAIN: 0
STRIDOR: 0
FACIAL SWELLING: 0
BACK PAIN: 0
ABDOMINAL DISTENTION: 0
BLOOD IN STOOL: 0
DIARRHEA: 0
VOICE CHANGE: 0
EYE DISCHARGE: 0
COUGH: 0
EYE PAIN: 0
TROUBLE SWALLOWING: 0
COLOR CHANGE: 0
NAUSEA: 0
EYE ITCHING: 0
ALLERGIC/IMMUNOLOGIC NEGATIVE: 1
SORE THROAT: 0
PHOTOPHOBIA: 0
SHORTNESS OF BREATH: 1
SINUS PRESSURE: 0
ANAL BLEEDING: 0
RHINORRHEA: 0
VOMITING: 0
RECTAL PAIN: 0
WHEEZING: 0
CHEST TIGHTNESS: 0

## 2020-11-13 NOTE — PROGRESS NOTES
Deon Bosch is a 62 y.o. female. HPI/Chief C/O:  Chief Complaint   Patient presents with    Shortness of Breath     Pt states that he has had shortness of breath for \"a while\", her children think it is due to her weight, but pt is unsure    Irregular Heart Beat     Pt also reports an irregular heart rate, states it is constant, denies chest pain    Diabetes     Pt also concerned that her sugars have been running in the high 100's, pt not able to take a glucose    Other     Pt verbally agreed to telemedicine visit, and is currently at home in [place] for the visit      No Known Allergies  This 62year old female presents for ED follow up for UTI. Pt c/o palpitations and dyspnea while walking, for a few months. Pt denies chest pain, denies nausea, denies vomiting, and denies diaphoresis. Pt has hypertension, and type 2 DM. Pt c/o of joint pain. TeleMedicine Video Visit    This visit was performed as a virtual video visit using a synchronous, two-way, audio-video telehealth technology platform. Patient identification was verified at the start of the visit, including the patient's telephone number and physical location. I discussed with the patient the nature of our telehealth visits, that:     1. Due to the nature of an audio- video modality, the only components of a physical exam that could be done are the elements supported by direct observation. 2. I would evaluate the patient and recommend diagnostics and treatments based on my assessment. 3. If it was felt that the patient should be evaluated in clinic or an emergency room setting, then they would be directed there. 4. Our sessions are not being recorded and that personal health information is protected. 5. Our team would provide follow up care in person if/when the patient needs it. Patient does agree to proceed with telemedicine consultation.     Patient's location: home address in [place]  Physician  location other address in [facility] other people involved in call Dr. Vladimir Blandon        Time spent: Greater than 25    This visit was completed virtually using D[employer]. me          ROS:  Review of Systems   Constitutional: Positive for fatigue. Negative for activity change, appetite change, chills, diaphoresis, fever and unexpected weight change. HENT: Negative. Negative for congestion, dental problem, drooling, ear discharge, ear pain, facial swelling, hearing loss, mouth sores, nosebleeds, postnasal drip, rhinorrhea, sinus pressure, sinus pain, sneezing, sore throat, tinnitus, trouble swallowing and voice change. Eyes: Negative. Negative for photophobia, pain, discharge, redness, itching and visual disturbance. Respiratory: Positive for shortness of breath. Negative for cough, choking, chest tightness, wheezing and stridor. Cardiovascular: Negative. Negative for chest pain, palpitations and leg swelling. Gastrointestinal: Negative for abdominal distention, abdominal pain, anal bleeding, blood in stool, constipation, diarrhea, nausea, rectal pain and vomiting. Endocrine: Negative. Negative for cold intolerance, heat intolerance, polydipsia, polyphagia and polyuria. Genitourinary: Positive for frequency and urgency. Negative for decreased urine volume, difficulty urinating, dysuria, flank pain, genital sores, hematuria, menstrual problem and pelvic pain. Musculoskeletal: Positive for arthralgias and myalgias. Negative for back pain, gait problem, joint swelling, neck pain and neck stiffness. Skin: Negative. Negative for color change, pallor, rash and wound. Allergic/Immunologic: Negative. Neurological: Negative. Negative for dizziness, tremors, seizures, syncope, facial asymmetry, speech difficulty, weakness, light-headedness, numbness and headaches. Hematological: Negative. Negative for adenopathy. Does not bruise/bleed easily.    Psychiatric/Behavioral: Negative for agitation, behavioral problems, confusion, decreased concentration, dysphoric mood, hallucinations, self-injury, sleep disturbance and suicidal ideas. The patient is nervous/anxious. The patient is not hyperactive. Past Medical/Surgical Hx;  Reviewed with patient      Diagnosis Date    History of blood transfusion 7353    at [facility] complication after kidney stone surgery    History of kidney stones     Hx of cardiovascular stress test 12/17/2019    Lexiscan stress test    Hypertension     Thyroid disease      Past Surgical History:   Procedure Laterality Date    CHOLECYSTECTOMY, LAPAROSCOPIC N/A 5/18/2020    LAPAROSCOPIC CHOLECYSTECTOMY WITH INTRAOPERATIVE CHOLANGIOGRAM performed by Igor Reno MD at . [address] HYSTERECTOMY      JOINT REPLACEMENT Right 2019    knee  [address]      incisional removal       Past Family Hx:  Reviewed with patient      Problem Relation Age of Onset   Belen Sullivan Cancer Mother         lung    COPD Father        Social Hx:  Reviewed with patient  Social History     Tobacco Use    Smoking status: Never Smoker    Smokeless tobacco: Never Used   Substance Use Topics    Alcohol use: Not Currently       OBJECTIVE  LMP  (LMP Unknown)     Problem List:  Sharla does not have any pertinent problems on file. PHYS EX:  Pt looks well    ASSESSMENT/PLAN  Sharla was seen today for shortness of breath, irregular heart beat, diabetes and other. Diagnoses and all orders for this visit:    Mikey Elizalde MD, Cardiology, [facility]. Arthralgia, unspecified joint  Not controlled. Zyloprim. Urinary tract infection with hematuria, site unspecified  Stable. Omnicef. Patient instructed on treatment and prevention UTI. Essential hypertension  Stable. HCTZ, ace, BB, low salt diet. Dyspnea on exertion  Stable. Cardiology. Pt instructed if any worse go ED ASAP.     Outpatient Encounter Medications as of 11/6/2020   Medication Sig Dispense Refill    monitoring     Creatinine monitoring     DTaP/Tdap/Td vaccine (3 - Td)    Hepatitis C screen     Hepatitis A vaccine     Hepatitis B vaccine     Hib vaccine     Meningococcal (ACWY) vaccine     Pneumococcal 0-64 years Vaccine

## 2020-11-17 NOTE — TELEPHONE ENCOUNTER
Pt sent [employer] message requesting IBU 800mg. Please advise.     Electronically signed by Gurwinder aBez MA on 11/17/20 at 9:34 AM EST

## 2020-11-18 RX ORDER — IBUPROFEN 800 MG/1
800 TABLET ORAL EVERY 8 HOURS PRN
Qty: 270 TABLET | Refills: 0 | Status: SHIPPED
Start: 2020-11-18 | End: 2021-06-18 | Stop reason: CLARIF

## 2020-12-28 ENCOUNTER — NURSE TRIAGE (OUTPATIENT)
Dept: OTHER | Facility: CLINIC | Age: 60
End: 2020-12-28

## 2020-12-28 ENCOUNTER — TELEPHONE (OUTPATIENT)
Dept: ADMINISTRATIVE | Age: 60
End: 2020-12-28

## 2020-12-28 NOTE — TELEPHONE ENCOUNTER
Reason for Disposition   Unable to complete triage due to phone connection issues    Protocols used: NO CONTACT OR DUPLICATE CONTACT CALL-ADULT-[place]    Call dropped before triage, writer tried to call patient back without any answer.

## 2020-12-28 NOTE — TELEPHONE ENCOUNTER
Pt called stating her blood sugar has been elevated . On Saturday it was 408 and on yesterday it was 307.  Transferred the call to [facility]  in nurse triage

## 2021-01-04 DIAGNOSIS — I10 ESSENTIAL HYPERTENSION: ICD-10-CM

## 2021-01-04 RX ORDER — METOPROLOL SUCCINATE 100 MG/1
TABLET, EXTENDED RELEASE ORAL
Qty: 90 TABLET | Refills: 1 | Status: SHIPPED
Start: 2021-01-04 | End: 2021-01-27 | Stop reason: SDUPTHER

## 2021-01-04 RX ORDER — LISINOPRIL 40 MG/1
TABLET ORAL
Qty: 90 TABLET | Refills: 1 | Status: SHIPPED
Start: 2021-01-04 | End: 2021-01-27 | Stop reason: SDUPTHER

## 2021-01-26 DIAGNOSIS — F32.A DEPRESSION, UNSPECIFIED DEPRESSION TYPE: ICD-10-CM

## 2021-01-26 DIAGNOSIS — E03.9 HYPOTHYROIDISM, UNSPECIFIED TYPE: ICD-10-CM

## 2021-01-26 DIAGNOSIS — I10 ESSENTIAL HYPERTENSION: ICD-10-CM

## 2021-01-26 RX ORDER — CITALOPRAM 20 MG/1
TABLET ORAL
Qty: 90 TABLET | Refills: 0 | Status: SHIPPED
Start: 2021-01-26 | End: 2021-01-27 | Stop reason: SDUPTHER

## 2021-01-26 RX ORDER — HYDROCHLOROTHIAZIDE 25 MG/1
TABLET ORAL
Qty: 90 TABLET | Refills: 0 | Status: SHIPPED
Start: 2021-01-26 | End: 2021-01-27 | Stop reason: SDUPTHER

## 2021-01-26 RX ORDER — LEVOTHYROXINE SODIUM 0.07 MG/1
TABLET ORAL
Qty: 90 TABLET | Refills: 0 | Status: SHIPPED
Start: 2021-01-26 | End: 2021-01-27 | Stop reason: SDUPTHER

## 2021-01-27 ENCOUNTER — OFFICE VISIT (OUTPATIENT)
Dept: FAMILY MEDICINE CLINIC | Age: 61
End: 2021-01-27
Payer: MEDICAID

## 2021-01-27 VITALS
RESPIRATION RATE: 18 BRPM | WEIGHT: 286 LBS | BODY MASS INDEX: 50.68 KG/M2 | HEART RATE: 84 BPM | SYSTOLIC BLOOD PRESSURE: 122 MMHG | TEMPERATURE: 98.6 F | DIASTOLIC BLOOD PRESSURE: 84 MMHG | HEIGHT: 63 IN | OXYGEN SATURATION: 94 %

## 2021-01-27 DIAGNOSIS — Z12.11 SCREEN FOR COLON CANCER: Primary | ICD-10-CM

## 2021-01-27 DIAGNOSIS — E78.2 MIXED HYPERLIPIDEMIA: ICD-10-CM

## 2021-01-27 DIAGNOSIS — R53.83 FATIGUE, UNSPECIFIED TYPE: ICD-10-CM

## 2021-01-27 DIAGNOSIS — E66.01 MORBID OBESITY (HCC): ICD-10-CM

## 2021-01-27 DIAGNOSIS — R73.01 IFG (IMPAIRED FASTING GLUCOSE): ICD-10-CM

## 2021-01-27 DIAGNOSIS — E03.9 HYPOTHYROIDISM, UNSPECIFIED TYPE: ICD-10-CM

## 2021-01-27 DIAGNOSIS — F32.A DEPRESSION, UNSPECIFIED DEPRESSION TYPE: ICD-10-CM

## 2021-01-27 DIAGNOSIS — Z12.12 SCREENING FOR RECTAL CANCER: ICD-10-CM

## 2021-01-27 DIAGNOSIS — L30.9 DERMATITIS: ICD-10-CM

## 2021-01-27 DIAGNOSIS — R06.02 SHORTNESS OF BREATH: ICD-10-CM

## 2021-01-27 DIAGNOSIS — Z20.822 COVID-19 RULED OUT: ICD-10-CM

## 2021-01-27 DIAGNOSIS — I10 ESSENTIAL HYPERTENSION: ICD-10-CM

## 2021-01-27 DIAGNOSIS — Z12.31 ENCOUNTER FOR SCREENING MAMMOGRAM FOR MALIGNANT NEOPLASM OF BREAST: ICD-10-CM

## 2021-01-27 PROCEDURE — G8484 FLU IMMUNIZE NO ADMIN: HCPCS | Performed by: FAMILY MEDICINE

## 2021-01-27 PROCEDURE — 99214 OFFICE O/P EST MOD 30 MIN: CPT | Performed by: FAMILY MEDICINE

## 2021-01-27 PROCEDURE — G8427 DOCREV CUR MEDS BY ELIG CLIN: HCPCS | Performed by: FAMILY MEDICINE

## 2021-01-27 PROCEDURE — G8417 CALC BMI ABV UP PARAM F/U: HCPCS | Performed by: FAMILY MEDICINE

## 2021-01-27 PROCEDURE — 3017F COLORECTAL CA SCREEN DOC REV: CPT | Performed by: FAMILY MEDICINE

## 2021-01-27 PROCEDURE — 1036F TOBACCO NON-USER: CPT | Performed by: FAMILY MEDICINE

## 2021-01-27 RX ORDER — METOPROLOL SUCCINATE 100 MG/1
TABLET, EXTENDED RELEASE ORAL
Qty: 90 TABLET | Refills: 1 | Status: SHIPPED
Start: 2021-01-27 | End: 2021-11-05 | Stop reason: SDUPTHER

## 2021-01-27 RX ORDER — ALBUTEROL SULFATE 90 UG/1
2 AEROSOL, METERED RESPIRATORY (INHALATION) EVERY 6 HOURS PRN
Qty: 1 INHALER | Refills: 3 | Status: SHIPPED
Start: 2021-01-27 | End: 2022-05-31

## 2021-01-27 RX ORDER — CITALOPRAM 20 MG/1
TABLET ORAL
Qty: 90 TABLET | Refills: 1 | Status: SHIPPED
Start: 2021-01-27 | End: 2021-11-05 | Stop reason: SDUPTHER

## 2021-01-27 RX ORDER — LEVOTHYROXINE SODIUM 0.07 MG/1
TABLET ORAL
Qty: 90 TABLET | Refills: 1 | Status: SHIPPED
Start: 2021-01-27 | End: 2021-11-05 | Stop reason: SDUPTHER

## 2021-01-27 RX ORDER — LISINOPRIL 40 MG/1
TABLET ORAL
Qty: 90 TABLET | Refills: 1 | Status: SHIPPED
Start: 2021-01-27 | End: 2021-11-05 | Stop reason: SDUPTHER

## 2021-01-27 RX ORDER — HYDROCHLOROTHIAZIDE 25 MG/1
TABLET ORAL
Qty: 90 TABLET | Refills: 1 | Status: SHIPPED
Start: 2021-01-27 | End: 2021-11-05 | Stop reason: SDUPTHER

## 2021-01-27 SDOH — ECONOMIC STABILITY: TRANSPORTATION INSECURITY
IN THE PAST 12 MONTHS, HAS LACK OF TRANSPORTATION KEPT YOU FROM MEETINGS, WORK, OR FROM GETTING THINGS NEEDED FOR DAILY LIVING?: NO

## 2021-01-27 SDOH — ECONOMIC STABILITY: FOOD INSECURITY: WITHIN THE PAST 12 MONTHS, THE FOOD YOU BOUGHT JUST DIDN'T LAST AND YOU DIDN'T HAVE MONEY TO GET MORE.: NEVER TRUE

## 2021-01-27 SDOH — ECONOMIC STABILITY: INCOME INSECURITY: HOW HARD IS IT FOR YOU TO PAY FOR THE VERY BASICS LIKE FOOD, HOUSING, MEDICAL CARE, AND HEATING?: NOT HARD AT ALL

## 2021-01-27 SDOH — ECONOMIC STABILITY: TRANSPORTATION INSECURITY
IN THE PAST 12 MONTHS, HAS THE LACK OF TRANSPORTATION KEPT YOU FROM MEDICAL APPOINTMENTS OR FROM GETTING MEDICATIONS?: NO

## 2021-01-29 DIAGNOSIS — R06.02 SHORTNESS OF BREATH: ICD-10-CM

## 2021-01-29 DIAGNOSIS — Z20.822 COVID-19 RULED OUT: ICD-10-CM

## 2021-01-29 DIAGNOSIS — R53.83 FATIGUE, UNSPECIFIED TYPE: ICD-10-CM

## 2021-01-30 LAB — SARS-COV-2, PCR: NOT DETECTED

## 2021-02-03 ASSESSMENT — ENCOUNTER SYMPTOMS
BLOOD IN STOOL: 0
RHINORRHEA: 0
CONSTIPATION: 0
EYE PAIN: 0
FACIAL SWELLING: 0
EYES NEGATIVE: 1
ABDOMINAL DISTENTION: 0
EYE DISCHARGE: 0
DIARRHEA: 1
SHORTNESS OF BREATH: 1
EYE REDNESS: 0
COUGH: 0
VOMITING: 0
RECTAL PAIN: 0
PHOTOPHOBIA: 0
CHOKING: 0
BACK PAIN: 0
SINUS PAIN: 0
STRIDOR: 0
VOICE CHANGE: 0
SINUS PRESSURE: 0
EYE ITCHING: 0
COLOR CHANGE: 0
SORE THROAT: 0
ALLERGIC/IMMUNOLOGIC NEGATIVE: 1
ANAL BLEEDING: 0
CHEST TIGHTNESS: 0
NAUSEA: 0
ABDOMINAL PAIN: 0
WHEEZING: 0
TROUBLE SWALLOWING: 0

## 2021-02-03 NOTE — PROGRESS NOTES
Jennifer Lopez is a 56 y.o. female. HPI/Chief C/O:  Chief Complaint   Patient presents with    Medication Refill     Pt here for check up and refills    Health Maintenance     Mammogram, Cologuard pended    Shortness of Breath     Pt c/o SOB, experienced it so bad on Monday that she felt like she could not breath    Rash     Pt also has rash on her abdomen and arms, denies itching    Other     patient consents to telemedicine visit and is at home in [place]     No Known Allergies  This 56year old female presents hypertension, hypothyroid, depression, morbid obesity ([address] hyperlipidemia, and fatigue. Pt c/o shortness of breath and rash on pamela. Arms, and legs. Pt denies fever, denies nausea, and vomiting. Pt states she will set up cardiology. TeleMedicine Video Visit    This visit was performed as a virtual video visit using a synchronous, two-way, audio-video telehealth technology platform. Patient identification was verified at the start of the visit, including the patient's telephone number and physical location. I discussed with the patient the nature of our telehealth visits, that:     1. Due to the nature of an audio- video modality, the only components of a physical exam that could be done are the elements supported by direct observation. 2. I would evaluate the patient and recommend diagnostics and treatments based on my assessment. 3. If it was felt that the patient should be evaluated in clinic or an emergency room setting, then they would be directed there. 4. Our sessions are not being recorded and that personal health information is protected. 5. Our team would provide follow up care in person if/when the patient needs it. Patient does agree to proceed with telemedicine consultation.     Patient's location: home address in [facility]  Physician  location other address in [facility] other people involved in call Dr. Jeff Porter      Time spent: Greater than 30 Psychiatric/Behavioral: Negative for agitation, behavioral problems, confusion, decreased concentration, dysphoric mood, hallucinations, self-injury, sleep disturbance and suicidal ideas. The patient is nervous/anxious. The patient is not hyperactive. Past Medical/Surgical Hx;  Reviewed with patient      Diagnosis Date    History of blood transfusion 0389    at [facility] complication after kidney stone surgery    History of kidney stones     Hx of cardiovascular stress test 12/17/2019    Lexiscan stress test    Hypertension     Thyroid disease      Past Surgical History:   Procedure Laterality Date    CHOLECYSTECTOMY, LAPAROSCOPIC N/A 5/18/2020    LAPAROSCOPIC CHOLECYSTECTOMY WITH INTRAOPERATIVE CHOLANGIOGRAM performed by Fco Eid MD at [place] [address], VAGINAL      JOINT REPLACEMENT Right 2019    knee  [address]      incisional removal       Past Family Hx:  Reviewed with patient      Problem Relation Age of Onset   Judahana Harshayne Cancer Mother         lung    COPD Father        Social Hx:  Reviewed with patient  Social History     Tobacco Use    Smoking status: Never Smoker    Smokeless tobacco: Never Used   Substance Use Topics    Alcohol use: Not Currently       OBJECTIVE  /84   Pulse 84   Temp 98.6 °F (37 °C) (Temporal)   Resp 18   Ht 5' 3\" (1.6 m)   Wt 286 lb (129.7 kg)   LMP  (LMP Unknown)   SpO2 94%   Breastfeeding No   BMI 50.66 kg/m²     Problem List:  Sharla does not have any pertinent problems on file. PHYS EX:  Pt is breathing well sitting    ASSESSMENT/PLAN  Sharla was seen today for medication refill, health maintenance, shortness of breath, rash and other. Diagnoses and all orders for this visit:    Screen for colon cancer  -     Cologuard; Future  -     CBC Auto Differential; Future  -     Comprehensive Metabolic Panel;  Future    Essential hypertension -     hydroCHLOROthiazide (HYDRODIURIL) 25 MG tablet; take 1 tablet by mouth once daily  -     lisinopril (PRINIVIL;ZESTRIL) 40 MG tablet; take 1 tablet by mouth once daily  -     metoprolol succinate (TOPROL XL) 100 MG extended release tablet; take 1 tablet by mouth once daily  -     CBC Auto Differential; Future  -     Comprehensive Metabolic Panel; Future    Hypothyroidism, unspecified type  -     levothyroxine (SYNTHROID) 75 MCG tablet; take 1 tablet by mouth once daily  -     CBC Auto Differential; Future  -     Comprehensive Metabolic Panel; Future  -     TSH without Reflex; Future    Depression, unspecified depression type  -     citalopram (CELEXA) 20 MG tablet; take 1 tablet by mouth once daily  -     CBC Auto Differential; Future  -     Comprehensive Metabolic Panel; Future    Screening for rectal cancer  -     Cologuard; Future  -     CBC Auto Differential; Future  -     Comprehensive Metabolic Panel; Future    Morbid obesity (HCC)  -     CBC Auto Differential; Future  -     Comprehensive Metabolic Panel; Future    Shortness of breath  -     CBC Auto Differential; Future  -     Comprehensive Metabolic Panel; Future  -     CT CHEST WO CONTRAST; Future  -     albuterol sulfate  (90 Base) MCG/ACT inhaler; Inhale 2 puffs into the lungs every 6 hours as needed for Wheezing or Shortness of Breath  -     Cancel: COVID-19 Ambulatory; Future    Mixed hyperlipidemia  -     CBC Auto Differential; Future  -     Comprehensive Metabolic Panel; Future  -     Lipid Panel; Future    IFG (impaired fasting glucose)  -     CBC Auto Differential; Future  -     Comprehensive Metabolic Panel; Future  -     Hemoglobin A1C; Future    Fatigue, unspecified type  -     CBC Auto Differential; Future  -     Comprehensive Metabolic Panel; Future  -     TSH without Reflex; Future  -     Cancel: COVID-19 Ambulatory; Future    COVID-19 ruled out  -     CBC Auto Differential; Future  -     Comprehensive Metabolic Panel;  Future -     Cancel: COVID-19 Ambulatory; Future    Dermatitis  -     CBC Auto Differential; Future  -     Comprehensive Metabolic Panel; Future  -     hydrocortisone 2.5 % cream; Apply topically 2 times daily. Encounter for screening mammogram for malignant neoplasm of breast  -     BRITTANY DIGITAL SCREEN W OR WO CAD BILATERAL; Future  -     CBC Auto Differential; Future  -     Comprehensive Metabolic Panel; Future    Please take tylenol every 6 hours as needed for temperature or aches   Hydrate with 40 to 50 oz fluids   I have sent in medication for you  Please stay in touch and let me know how you are doing  If you get worse, please call asap  You should self isolate   Pt instructed to get covid test    Pt instructed if any worse go ED ASAP. Outpatient Encounter Medications as of 1/27/2021   Medication Sig Dispense Refill    hydroCHLOROthiazide (HYDRODIURIL) 25 MG tablet take 1 tablet by mouth once daily 90 tablet 1    levothyroxine (SYNTHROID) 75 MCG tablet take 1 tablet by mouth once daily 90 tablet 1    citalopram (CELEXA) 20 MG tablet take 1 tablet by mouth once daily 90 tablet 1    lisinopril (PRINIVIL;ZESTRIL) 40 MG tablet take 1 tablet by mouth once daily 90 tablet 1    metoprolol succinate (TOPROL XL) 100 MG extended release tablet take 1 tablet by mouth once daily 90 tablet 1    albuterol sulfate  (90 Base) MCG/ACT inhaler Inhale 2 puffs into the lungs every 6 hours as needed for Wheezing or Shortness of Breath 1 Inhaler 3    hydrocortisone 2.5 % cream Apply topically 2 times daily.  1 Tube 0    ibuprofen (IBU) 800 MG tablet Take 1 tablet by mouth every 8 hours as needed for Pain 270 tablet 0    [DISCONTINUED] hydroCHLOROthiazide (HYDRODIURIL) 25 MG tablet take 1 tablet by mouth once daily 90 tablet 0    [DISCONTINUED] citalopram (CELEXA) 20 MG tablet take 1 tablet by mouth once daily 90 tablet 0  [DISCONTINUED] levothyroxine (SYNTHROID) 75 MCG tablet take 1 tablet by mouth once daily 90 tablet 0    [DISCONTINUED] lisinopril (PRINIVIL;ZESTRIL) 40 MG tablet take 1 tablet by mouth once daily 90 tablet 1    [DISCONTINUED] metoprolol succinate (TOPROL XL) 100 MG extended release tablet take 1 tablet by mouth once daily 90 tablet 1    [DISCONTINUED] vitamin D (ERGOCALCIFEROL) 1.25 MG (63100 UT) CAPS capsule Take 1 capsule by mouth once a week (Patient not taking: Reported on 11/6/2020) 4 capsule 0    potassium citrate (UROCIT-K) 10 MEQ (1080 MG) extended release tablet Take 1 tablet by mouth 2 times daily  0    Lancets MISC Test 2 times a day & as needed for symptoms of irregular blood glucose. (Patient not taking: Reported on 1/27/2021) 200 each 3    blood glucose monitor strips Test 2 times a day & as needed for symptoms of irregular blood glucose. (Patient not taking: Reported on 1/27/2021) 200 strip 3    glucose monitoring kit (FREESTYLE) monitoring kit Test 2 times a day & as needed for symptoms of irregular blood glucose. (Patient not taking: Reported on 1/27/2021) 1 kit 0    allopurinol (ZYLOPRIM) 100 MG tablet Take 200 mg by mouth        No facility-administered encounter medications on file as of 1/27/2021. No follow-ups on file.         Reviewed recent labs related to Sharla's current problems      Discussed importance of regular Health Maintenance follow up  Health Maintenance   Topic    Diabetic foot exam     HIV screen     Breast cancer screen     Shingles Vaccine (1 of 2)    Diabetic retinal exam     Colon Cancer Screen FIT/FOBT     Flu vaccine (1)    Diabetic microalbuminuria test     Lipid screen     A1C test (Diabetic or Prediabetic)     TSH testing     Potassium monitoring     Creatinine monitoring     DTaP/Tdap/Td vaccine (3 - Td)    Hepatitis C screen     Hepatitis A vaccine     Hepatitis B vaccine     Hib vaccine     Meningococcal (ACWY) vaccine  Pneumococcal 0-64 years Vaccine

## 2021-02-04 ENCOUNTER — TELEPHONE (OUTPATIENT)
Dept: FAMILY MEDICINE CLINIC | Age: 61
End: 2021-02-04

## 2021-02-04 NOTE — LETTER
[facility]  [address] [address]  Phone: [phone]  Fax: Rauljumaruss Magdalena,         February 4, 2021    Jose Mir  [address] [address] [facility] [facility] [address]      Dear Lonny Claude:    Upon receipt of this notice, please call the office to schedule for follow up on your recent illness. Doctor would like you to schedule an appointment in office. If you have any questions or concerns, please don't hesitate to call.     Sincerely,        Jaylen Levin DO

## 2021-02-04 NOTE — PROGRESS NOTES
Overdue results letter mailed to patient regarding cologuard order.   Electronically signed by Wendy Tineo on 2/4/2021 at 9:05 AM

## 2021-02-04 NOTE — TELEPHONE ENCOUNTER
----- Message from Rochelle Zamudio DO sent at 2/3/2021  1:58 PM EST -----  Call pt and set up in office appointment in 1 week    a follow up from the virtual    document

## 2021-02-12 ENCOUNTER — TELEPHONE (OUTPATIENT)
Dept: FAMILY MEDICINE CLINIC | Age: 61
End: 2021-02-12

## 2021-02-12 ENCOUNTER — HOSPITAL ENCOUNTER (EMERGENCY)
Age: 61
Discharge: HOME | End: 2021-02-12
Attending: EMERGENCY MEDICINE
Payer: MEDICAID

## 2021-02-12 ENCOUNTER — APPOINTMENT (OUTPATIENT)
Dept: GENERAL RADIOLOGY | Age: 61
End: 2021-02-12
Payer: MEDICAID

## 2021-02-12 VITALS
RESPIRATION RATE: 18 BRPM | HEART RATE: 66 BPM | SYSTOLIC BLOOD PRESSURE: 137 MMHG | OXYGEN SATURATION: 96 % | TEMPERATURE: 98.7 F | DIASTOLIC BLOOD PRESSURE: 77 MMHG

## 2021-02-12 DIAGNOSIS — E11.65 TYPE 2 DIABETES MELLITUS WITH HYPERGLYCEMIA, WITHOUT LONG-TERM CURRENT USE OF INSULIN (HCC): Primary | ICD-10-CM

## 2021-02-12 LAB
ALBUMIN SERPL-MCNC: 4.5 G/DL (ref 3.5–5.2)
ALP BLD-CCNC: 142 U/L (ref 35–104)
ALT SERPL-CCNC: 61 U/L (ref 0–32)
ANION GAP SERPL CALCULATED.3IONS-SCNC: 14 MMOL/L (ref 7–16)
AST SERPL-CCNC: 61 U/L (ref 0–31)
BASOPHILS ABSOLUTE: 0.08 E9/L (ref 0–0.2)
BASOPHILS RELATIVE PERCENT: 0.6 % (ref 0–2)
BETA-HYDROXYBUTYRATE: 0.79 MMOL/L (ref 0.02–0.27)
BILIRUB SERPL-MCNC: 0.8 MG/DL (ref 0–1.2)
BILIRUBIN URINE: NEGATIVE
BLOOD, URINE: NEGATIVE
BUN BLDV-MCNC: 27 MG/DL (ref 8–23)
CALCIUM SERPL-MCNC: 10.1 MG/DL (ref 8.6–10.2)
CHLORIDE BLD-SCNC: 89 MMOL/L (ref 98–107)
CLARITY: CLEAR
CO2: 26 MMOL/L (ref 22–29)
COLOR: YELLOW
CREAT SERPL-MCNC: 0.8 MG/DL (ref 0.5–1)
EKG ATRIAL RATE: 62 BPM
EKG P AXIS: 56 DEGREES
EKG P-R INTERVAL: 164 MS
EKG Q-T INTERVAL: 418 MS
EKG QRS DURATION: 98 MS
EKG QTC CALCULATION (BAZETT): 424 MS
EKG R AXIS: 4 DEGREES
EKG T AXIS: 18 DEGREES
EKG VENTRICULAR RATE: 62 BPM
EOSINOPHILS ABSOLUTE: 0.05 E9/L (ref 0.05–0.5)
EOSINOPHILS RELATIVE PERCENT: 0.4 % (ref 0–6)
GFR AFRICAN AMERICAN: >60
GFR NON-AFRICAN AMERICAN: >60 ML/MIN/1.73
GLUCOSE BLD-MCNC: 500 MG/DL (ref 74–99)
GLUCOSE URINE: >=1000 MG/DL
HCT VFR BLD CALC: 43.5 % (ref 34–48)
HEMOGLOBIN: 14.1 G/DL (ref 11.5–15.5)
IMMATURE GRANULOCYTES #: 0.1 E9/L
IMMATURE GRANULOCYTES %: 0.7 % (ref 0–5)
KETONES, URINE: ABNORMAL MG/DL
LACTIC ACID: 1.5 MMOL/L (ref 0.5–2.2)
LEUKOCYTE ESTERASE, URINE: NEGATIVE
LIPASE: 95 U/L (ref 13–60)
LYMPHOCYTES ABSOLUTE: 2.62 E9/L (ref 1.5–4)
LYMPHOCYTES RELATIVE PERCENT: 19.5 % (ref 20–42)
MCH RBC QN AUTO: 29 PG (ref 26–35)
MCHC RBC AUTO-ENTMCNC: 32.4 % (ref 32–34.5)
MCV RBC AUTO: 89.3 FL (ref 80–99.9)
METER GLUCOSE: 402 MG/DL (ref 74–99)
METER GLUCOSE: 444 MG/DL (ref 74–99)
METER GLUCOSE: >500 MG/DL (ref 74–99)
MONOCYTES ABSOLUTE: 0.98 E9/L (ref 0.1–0.95)
MONOCYTES RELATIVE PERCENT: 7.3 % (ref 2–12)
NEUTROPHILS ABSOLUTE: 9.58 E9/L (ref 1.8–7.3)
NEUTROPHILS RELATIVE PERCENT: 71.5 % (ref 43–80)
NITRITE, URINE: NEGATIVE
PDW BLD-RTO: 14.1 FL (ref 11.5–15)
PH UA: 5 (ref 5–9)
PH VENOUS: 7.38 (ref 7.35–7.45)
PLATELET # BLD: 208 E9/L (ref 130–450)
PMV BLD AUTO: 11.9 FL (ref 7–12)
POTASSIUM SERPL-SCNC: 4.2 MMOL/L (ref 3.5–5)
PROTEIN UA: NEGATIVE MG/DL
RBC # BLD: 4.87 E12/L (ref 3.5–5.5)
SODIUM BLD-SCNC: 129 MMOL/L (ref 132–146)
SPECIFIC GRAVITY UA: 1.02 (ref 1–1.03)
TOTAL PROTEIN: 8.4 G/DL (ref 6.4–8.3)
TROPONIN: <0.01 NG/ML (ref 0–0.03)
UROBILINOGEN, URINE: 0.2 E.U./DL
WBC # BLD: 13.4 E9/L (ref 4.5–11.5)

## 2021-02-12 PROCEDURE — 93010 ELECTROCARDIOGRAM REPORT: CPT | Performed by: INTERNAL MEDICINE

## 2021-02-12 PROCEDURE — 93005 ELECTROCARDIOGRAM TRACING: CPT | Performed by: EMERGENCY MEDICINE

## 2021-02-12 PROCEDURE — 80053 COMPREHEN METABOLIC PANEL: CPT

## 2021-02-12 PROCEDURE — 84484 ASSAY OF TROPONIN QUANT: CPT

## 2021-02-12 PROCEDURE — 71045 X-RAY EXAM CHEST 1 VIEW: CPT

## 2021-02-12 PROCEDURE — 85025 COMPLETE CBC W/AUTO DIFF WBC: CPT

## 2021-02-12 PROCEDURE — 81003 URINALYSIS AUTO W/O SCOPE: CPT

## 2021-02-12 PROCEDURE — 99284 EMERGENCY DEPT VISIT MOD MDM: CPT

## 2021-02-12 PROCEDURE — 82800 BLOOD PH: CPT

## 2021-02-12 PROCEDURE — 82962 GLUCOSE BLOOD TEST: CPT

## 2021-02-12 PROCEDURE — 82010 KETONE BODYS QUAN: CPT

## 2021-02-12 PROCEDURE — 83605 ASSAY OF LACTIC ACID: CPT

## 2021-02-12 PROCEDURE — 83690 ASSAY OF LIPASE: CPT

## 2021-02-12 PROCEDURE — 2580000003 HC RX 258: Performed by: EMERGENCY MEDICINE

## 2021-02-12 RX ORDER — 0.9 % SODIUM CHLORIDE 0.9 %
1000 INTRAVENOUS SOLUTION INTRAVENOUS ONCE
Status: COMPLETED | OUTPATIENT
Start: 2021-02-12 | End: 2021-02-12

## 2021-02-12 RX ORDER — AMOXICILLIN 500 MG/1
500 CAPSULE ORAL 3 TIMES DAILY
COMMUNITY
Start: 2021-02-09 | End: 2021-02-19 | Stop reason: CLARIF

## 2021-02-12 RX ORDER — METHYLPREDNISOLONE 4 MG/1
4 TABLET ORAL SEE ADMIN INSTRUCTIONS
COMMUNITY
Start: 2021-02-09 | End: 2021-02-19 | Stop reason: CLARIF

## 2021-02-12 RX ADMIN — SODIUM CHLORIDE 1000 ML: 9 INJECTION, SOLUTION INTRAVENOUS at 12:08

## 2021-02-12 RX ADMIN — SODIUM CHLORIDE 1000 ML: 9 INJECTION, SOLUTION INTRAVENOUS at 13:45

## 2021-02-12 ASSESSMENT — ENCOUNTER SYMPTOMS
SORE THROAT: 0
SINUS PRESSURE: 0
COUGH: 0
BACK PAIN: 0
DIARRHEA: 0
CONSTIPATION: 0
SINUS PAIN: 0
SHORTNESS OF BREATH: 0
PHOTOPHOBIA: 0
NAUSEA: 0
RHINORRHEA: 0
VOMITING: 0
WHEEZING: 0
ABDOMINAL PAIN: 0

## 2021-02-12 NOTE — PROGRESS NOTES
Overdue results letter mailed to patient regarding mammogram order.   Electronically signed by Jevon Phan on 2/12/2021 at 9:15 AM

## 2021-02-12 NOTE — TELEPHONE ENCOUNTER
Pt called stating that her glucose this AM was 510. Per Dr. Kuhn Reading pt advised to go to ED.     Electronically signed by Luis Poe MA on 2/12/21 at 9:45 AM EST

## 2021-02-12 NOTE — TELEPHONE ENCOUNTER
This MA returned phone call to pt regarding advisement to present to ED. Pt has no ED encounters in chart at this time, and wanted to verify she was seeking medical attention. Pt states that she is on the way to the ED at this time and is going to [address] [place] [address],[address].       Electronically signed by Francisco Chopra MA on 2/12/21 at 10:33 AM EST

## 2021-02-12 NOTE — ED PROVIDER NOTES
Patient is a 40-year-old female who presents the chief complaint of hyperglycemia. Patient was told that she was prediabetic about a year and a half ago when she was getting the surgery. She has not been on any diabetes medication since. For the past month or so she has had increased thirst and increased urination. She took her blood sugar today and it was 509. She did have a tooth removed about 4 days ago and then was placed on a Medrol Dosepak the day after which she has been taking. She denies any other medication changes. Denies any fevers, chills, lightheadedness, dizziness, chest pain, shortness of breath, abdominal pain, nausea, vomiting. The history is provided by the patient. No  was used. Review of Systems   Constitutional: Negative for chills, fatigue and fever. HENT: Negative for congestion, rhinorrhea, sinus pressure, sinus pain, sneezing and sore throat. Eyes: Negative for photophobia and visual disturbance. Respiratory: Negative for cough, shortness of breath and wheezing. Cardiovascular: Negative for chest pain and palpitations. Gastrointestinal: Negative for abdominal pain, constipation, diarrhea, nausea and vomiting. Endocrine: Positive for polydipsia and polyuria. Genitourinary: Negative for dysuria, frequency and hematuria. Musculoskeletal: Negative for back pain, neck pain and neck stiffness. Skin: Negative for rash and wound. Neurological: Negative for dizziness, light-headedness and headaches. Psychiatric/Behavioral: Negative for agitation, behavioral problems and confusion. Physical Exam  Constitutional:       General: She is not in acute distress. Appearance: She is not toxic-appearing. HENT:      Head: Normocephalic. Mouth/Throat:      Mouth: Mucous membranes are moist.   Eyes:      General: No scleral icterus. Pupils: Pupils are equal, round, and reactive to light.    Neck:      Musculoskeletal: Normal range of motion. No neck rigidity. Cardiovascular:      Rate and Rhythm: Normal rate. Heart sounds: No murmur. No gallop. Pulmonary:      Effort: No respiratory distress. Breath sounds: Normal breath sounds. No wheezing. Comments: Clear breath sounds in all lung fields. No acute rest or stress. Pulse ox 94% on room air. Abdominal:      General: There is no distension. Palpations: Abdomen is soft. Tenderness: There is no abdominal tenderness. Comments: No abdominal tenderness to palpation. Abdomen is soft, nondistended. No guarding rigidity. Musculoskeletal: Normal range of motion. General: No swelling or deformity. Lymphadenopathy:      Cervical: No cervical adenopathy. Skin:     General: Skin is warm. Capillary Refill: Capillary refill takes less than 2 seconds. Coloration: Skin is not jaundiced. Findings: No bruising. Neurological:      Mental Status: She is alert and oriented to person, place, and time. Cranial Nerves: No cranial nerve deficit. Motor: No weakness. Comments: Patient is awake, alert, oriented x3. No focal neurological deficit. Psychiatric:         Mood and Affect: Mood normal.         Thought Content: Thought content normal.          Procedures     MDM  Number of Diagnoses or Management Options  Type 2 diabetes mellitus with hyperglycemia, without long-term current use of insulin ([facility] [address]  Diagnosis management comments: Patient is a 69-year-old female who presented with a chief complaint of hyperglycemia. Patient was told that she was prediabetic but a year and a half ago but has not had any other intervention since. She did take her glucose today and it was around 560. The patient has been complaining of polyuria and polydipsia for the past couple weeks. On exam she is well in appearance and she was not found to be in DKA but was hyperglycemic. She was given 2 L of IV fluids her glucose came down to 402. The patient otherwise was stable with normal vital signs. The patient was discharged home with Metformin, 500 mg twice a day and then she will follow with her PCP. She has any worsening symptoms she will return. No further questions. ED Course as of Feb 12 1952 Fri Feb 12, 2021   1334 Patient is resting comfortably in bed. I discussed her lab results with her. Her glucose is now 444, I will give her 1 additional liter of fluids and then repeat her sugar after that. Patient is agreeable. [MS]   1501 Patient's repeat glucose is 402, she will be given Metformin and then discharged home. She will follow her PCP. No further questions. [MS]      ED Course User Index  [MS] Kalyn Robison,       ED Course as of Feb 12 1952 Fri Feb 12, 2021   1334 Patient is resting comfortably in bed. I discussed her lab results with her. Her glucose is now 444, I will give her 1 additional liter of fluids and then repeat her sugar after that. Patient is agreeable. [MS]   1501 Patient's repeat glucose is 402, she will be given Metformin and then discharged home. She will follow her PCP. No further questions. [MS]      ED Course User Index  [MS] Kalyn Robison, DO       --------------------------------------------- PAST HISTORY ---------------------------------------------  Past Medical History:  has a past medical history of History of blood transfusion, History of kidney stones, Hx of cardiovascular stress test, Hypertension, and Thyroid disease. Past Surgical History:  has a past surgical history that includes Hysterectomy; joint replacement (Right, 2019); Kidney stone surgery; hernia repair; Cholecystectomy, laparoscopic (N/A, 5/18/2020); and Hysterectomy, vaginal.    Social History:  reports that she has never smoked. She has never used smokeless tobacco. She reports previous alcohol use. She reports that she does not use drugs.     Family History: family history includes COPD in her father; Cancer in her mother. The patients home medications have been reviewed. Allergies: Patient has no known allergies.     -------------------------------------------------- RESULTS -------------------------------------------------  Labs:  Results for orders placed or performed during the hospital encounter of 02/12/21   CBC Auto Differential   Result Value Ref Range    WBC 13.4 (H) 4.5 - 11.5 E9/L    RBC 4.87 3.50 - 5.50 E12/L    Hemoglobin 14.1 11.5 - 15.5 g/dL    Hematocrit 43.5 34.0 - 48.0 %    MCV 89.3 80.0 - 99.9 fL    MCH 29.0 26.0 - 35.0 pg    MCHC 32.4 32.0 - 34.5 %    RDW 14.1 11.5 - 15.0 fL    Platelets 260 179 - 657 E9/L    MPV 11.9 7.0 - 12.0 fL    Neutrophils % 71.5 43.0 - 80.0 %    Immature Granulocytes % 0.7 0.0 - 5.0 %    Lymphocytes % 19.5 (L) 20.0 - 42.0 %    Monocytes % 7.3 2.0 - 12.0 %    Eosinophils % 0.4 0.0 - 6.0 %    Basophils % 0.6 0.0 - 2.0 %    Neutrophils Absolute 9.58 (H) 1.80 - 7.30 E9/L    Immature Granulocytes # 0.10 E9/L    Lymphocytes Absolute 2.62 1.50 - 4.00 E9/L    Monocytes Absolute 0.98 (H) 0.10 - 0.95 E9/L    Eosinophils Absolute 0.05 0.05 - 0.50 E9/L    Basophils Absolute 0.08 0.00 - 0.20 E9/L   Comprehensive metabolic panel   Result Value Ref Range    Sodium 129 (L) 132 - 146 mmol/L    Potassium 4.2 3.5 - 5.0 mmol/L    Chloride 89 (L) 98 - 107 mmol/L    CO2 26 22 - 29 mmol/L    Anion Gap 14 7 - 16 mmol/L    Glucose 500 (H) 74 - 99 mg/dL    BUN 27 (H) 8 - 23 mg/dL    CREATININE 0.8 0.5 - 1.0 mg/dL    GFR Non-African American >60 >=60 mL/min/1.73    GFR African American >60     Calcium 10.1 8.6 - 10.2 mg/dL    Total Protein 8.4 (H) 6.4 - 8.3 g/dL    Albumin 4.5 3.5 - 5.2 g/dL    Total Bilirubin 0.8 0.0 - 1.2 mg/dL    Alkaline Phosphatase 142 (H) 35 - 104 U/L    ALT 61 (H) 0 - 32 U/L    AST 61 (H) 0 - 31 U/L   Lactic Acid, Plasma   Result Value Ref Range    Lactic Acid 1.5 0.5 - 2.2 mmol/L   Lipase   Result Value Ref Range    Lipase 95 (H) 13 - 60 U/L   Troponin   Result Value Ref ------------------------------------------  I have spoken with the patient and discussed todays results, in addition to providing specific details for the plan of care and counseling regarding the diagnosis and prognosis. Their questions are answered at this time and they are agreeable with the plan. I discussed at length with them reasons for immediate return here for re evaluation. They will followup with primary care by calling their office tomorrow. --------------------------------- ADDITIONAL PROVIDER NOTES ---------------------------------  At this time the patient is without objective evidence of an acute process requiring hospitalization or inpatient management. They have remained hemodynamically stable throughout their entire ED visit and are stable for discharge with outpatient follow-up. The plan has been discussed in detail and they are aware of the specific conditions for emergent return, as well as the importance of follow-up. Discharge Medication List as of 2/12/2021  3:18 PM      START taking these medications    Details   metFORMIN (GLUCOPHAGE) 500 MG tablet Take 1 tablet by mouth 2 times daily (with meals), Disp-60 tablet, R-[id number]             Diagnosis:  1. Type 2 diabetes mellitus with hyperglycemia, without long-term current use of insulin (HCC)        Disposition:  Patient's disposition: Discharge to home  Patient's condition is stable.               Makenzie Mccormack DO  02/12/21 1956

## 2021-02-12 NOTE — ED NOTES
Patient verbalized understanding of discharge instructions and prescription and will follow up with PCP as needed or return to ED if symptoms worsen     Megan Henao RN  02/12/21 [id number]

## 2021-02-15 ENCOUNTER — TELEPHONE (OUTPATIENT)
Dept: FAMILY MEDICINE CLINIC | Age: 61
End: 2021-02-15

## 2021-02-15 NOTE — TELEPHONE ENCOUNTER
----- Message from Bonita Thakur DO sent at 2/15/2021  9:21 AM EST -----  Call pt and set up office appointment with me for ED follow up for DM    document

## 2021-02-17 DIAGNOSIS — Z12.11 SCREEN FOR COLON CANCER: ICD-10-CM

## 2021-02-17 DIAGNOSIS — Z12.12 SCREENING FOR RECTAL CANCER: ICD-10-CM

## 2021-02-19 ENCOUNTER — OFFICE VISIT (OUTPATIENT)
Dept: FAMILY MEDICINE CLINIC | Age: 61
End: 2021-02-19
Payer: MEDICAID

## 2021-02-19 VITALS
HEART RATE: 74 BPM | WEIGHT: 279 LBS | HEIGHT: 63 IN | OXYGEN SATURATION: 96 % | DIASTOLIC BLOOD PRESSURE: 72 MMHG | TEMPERATURE: 98.1 F | SYSTOLIC BLOOD PRESSURE: 124 MMHG | BODY MASS INDEX: 49.43 KG/M2

## 2021-02-19 DIAGNOSIS — R73.01 IFG (IMPAIRED FASTING GLUCOSE): ICD-10-CM

## 2021-02-19 DIAGNOSIS — E03.9 HYPOTHYROIDISM, UNSPECIFIED TYPE: ICD-10-CM

## 2021-02-19 DIAGNOSIS — R19.5 POSITIVE COLORECTAL CANCER SCREENING USING COLOGUARD TEST: ICD-10-CM

## 2021-02-19 DIAGNOSIS — E66.01 MORBID OBESITY (HCC): ICD-10-CM

## 2021-02-19 DIAGNOSIS — Z12.12 SCREENING FOR RECTAL CANCER: ICD-10-CM

## 2021-02-19 DIAGNOSIS — I10 ESSENTIAL HYPERTENSION: ICD-10-CM

## 2021-02-19 DIAGNOSIS — R06.02 SHORTNESS OF BREATH: ICD-10-CM

## 2021-02-19 DIAGNOSIS — E78.2 MIXED HYPERLIPIDEMIA: ICD-10-CM

## 2021-02-19 DIAGNOSIS — Z12.11 SCREEN FOR COLON CANCER: ICD-10-CM

## 2021-02-19 DIAGNOSIS — N18.2 STAGE 2 CHRONIC KIDNEY DISEASE: ICD-10-CM

## 2021-02-19 DIAGNOSIS — Z20.822 COVID-19 RULED OUT: ICD-10-CM

## 2021-02-19 DIAGNOSIS — R53.83 FATIGUE, UNSPECIFIED TYPE: ICD-10-CM

## 2021-02-19 DIAGNOSIS — E11.9 TYPE 2 DIABETES MELLITUS WITHOUT COMPLICATION, WITH LONG-TERM CURRENT USE OF INSULIN (HCC): Primary | ICD-10-CM

## 2021-02-19 DIAGNOSIS — L30.9 DERMATITIS: ICD-10-CM

## 2021-02-19 DIAGNOSIS — E03.9 ACQUIRED HYPOTHYROIDISM: ICD-10-CM

## 2021-02-19 DIAGNOSIS — F32.A DEPRESSION, UNSPECIFIED DEPRESSION TYPE: ICD-10-CM

## 2021-02-19 DIAGNOSIS — Z79.4 TYPE 2 DIABETES MELLITUS WITHOUT COMPLICATION, WITH LONG-TERM CURRENT USE OF INSULIN (HCC): Primary | ICD-10-CM

## 2021-02-19 DIAGNOSIS — Z12.31 ENCOUNTER FOR SCREENING MAMMOGRAM FOR MALIGNANT NEOPLASM OF BREAST: ICD-10-CM

## 2021-02-19 LAB
ALBUMIN SERPL-MCNC: 4.3 G/DL (ref 3.5–5.2)
ALP BLD-CCNC: 115 U/L (ref 35–104)
ALT SERPL-CCNC: 86 U/L (ref 0–32)
ANION GAP SERPL CALCULATED.3IONS-SCNC: 16 MMOL/L (ref 7–16)
AST SERPL-CCNC: 91 U/L (ref 0–31)
BASOPHILS ABSOLUTE: 0.05 E9/L (ref 0–0.2)
BASOPHILS RELATIVE PERCENT: 0.6 % (ref 0–2)
BILIRUB SERPL-MCNC: 0.7 MG/DL (ref 0–1.2)
BUN BLDV-MCNC: 18 MG/DL (ref 8–23)
CALCIUM SERPL-MCNC: 10 MG/DL (ref 8.6–10.2)
CHLORIDE BLD-SCNC: 96 MMOL/L (ref 98–107)
CHOLESTEROL, TOTAL: 193 MG/DL (ref 0–199)
CHP ED QC CHECK: NORMAL
CO2: 22 MMOL/L (ref 22–29)
CREAT SERPL-MCNC: 0.8 MG/DL (ref 0.5–1)
EOSINOPHILS ABSOLUTE: 0.09 E9/L (ref 0.05–0.5)
EOSINOPHILS RELATIVE PERCENT: 1 % (ref 0–6)
GFR AFRICAN AMERICAN: >60
GFR NON-AFRICAN AMERICAN: >60 ML/MIN/1.73
GLUCOSE BLD-MCNC: 355 MG/DL (ref 74–99)
GLUCOSE BLD-MCNC: 368 MG/DL
HBA1C MFR BLD: 13 %
HBA1C MFR BLD: 13.2 % (ref 4–5.6)
HCT VFR BLD CALC: 43 % (ref 34–48)
HDLC SERPL-MCNC: 29 MG/DL
HEMOGLOBIN: 14.1 G/DL (ref 11.5–15.5)
IMMATURE GRANULOCYTES #: 0.05 E9/L
IMMATURE GRANULOCYTES %: 0.6 % (ref 0–5)
LDL CHOLESTEROL CALCULATED: 88 MG/DL (ref 0–99)
LYMPHOCYTES ABSOLUTE: 2.19 E9/L (ref 1.5–4)
LYMPHOCYTES RELATIVE PERCENT: 24.8 % (ref 20–42)
MCH RBC QN AUTO: 29.4 PG (ref 26–35)
MCHC RBC AUTO-ENTMCNC: 32.8 % (ref 32–34.5)
MCV RBC AUTO: 89.8 FL (ref 80–99.9)
MONOCYTES ABSOLUTE: 0.61 E9/L (ref 0.1–0.95)
MONOCYTES RELATIVE PERCENT: 6.9 % (ref 2–12)
NEUTROPHILS ABSOLUTE: 5.84 E9/L (ref 1.8–7.3)
NEUTROPHILS RELATIVE PERCENT: 66.1 % (ref 43–80)
PDW BLD-RTO: 14.4 FL (ref 11.5–15)
PLATELET # BLD: 181 E9/L (ref 130–450)
PMV BLD AUTO: 12.9 FL (ref 7–12)
POTASSIUM SERPL-SCNC: 4.1 MMOL/L (ref 3.5–5)
RBC # BLD: 4.79 E12/L (ref 3.5–5.5)
SODIUM BLD-SCNC: 134 MMOL/L (ref 132–146)
TOTAL PROTEIN: 7.8 G/DL (ref 6.4–8.3)
TRIGL SERPL-MCNC: 379 MG/DL (ref 0–149)
TSH SERPL DL<=0.05 MIU/L-ACNC: 2.95 UIU/ML (ref 0.27–4.2)
VLDLC SERPL CALC-MCNC: 76 MG/DL
WBC # BLD: 8.8 E9/L (ref 4.5–11.5)

## 2021-02-19 PROCEDURE — 1036F TOBACCO NON-USER: CPT | Performed by: FAMILY MEDICINE

## 2021-02-19 PROCEDURE — 82962 GLUCOSE BLOOD TEST: CPT | Performed by: FAMILY MEDICINE

## 2021-02-19 PROCEDURE — 2022F DILAT RTA XM EVC RTNOPTHY: CPT | Performed by: FAMILY MEDICINE

## 2021-02-19 PROCEDURE — G8427 DOCREV CUR MEDS BY ELIG CLIN: HCPCS | Performed by: FAMILY MEDICINE

## 2021-02-19 PROCEDURE — G8484 FLU IMMUNIZE NO ADMIN: HCPCS | Performed by: FAMILY MEDICINE

## 2021-02-19 PROCEDURE — G8417 CALC BMI ABV UP PARAM F/U: HCPCS | Performed by: FAMILY MEDICINE

## 2021-02-19 PROCEDURE — 3017F COLORECTAL CA SCREEN DOC REV: CPT | Performed by: FAMILY MEDICINE

## 2021-02-19 PROCEDURE — 3046F HEMOGLOBIN A1C LEVEL >9.0%: CPT | Performed by: FAMILY MEDICINE

## 2021-02-19 PROCEDURE — 83036 HEMOGLOBIN GLYCOSYLATED A1C: CPT | Performed by: FAMILY MEDICINE

## 2021-02-19 PROCEDURE — 99214 OFFICE O/P EST MOD 30 MIN: CPT | Performed by: FAMILY MEDICINE

## 2021-02-19 RX ORDER — INSULIN GLARGINE 100 [IU]/ML
10 INJECTION, SOLUTION SUBCUTANEOUS NIGHTLY
Qty: 5 PEN | Refills: 3 | Status: SHIPPED
Start: 2021-02-19 | End: 2021-03-01 | Stop reason: SDUPTHER

## 2021-02-19 NOTE — PATIENT INSTRUCTIONS
Patient Education        Learning About Diabetes Food Guidelines  Your Care Instructions     Meal planning is important to manage diabetes. It helps keep your blood sugar at a target level (which you set with your doctor). You don't have to eat special foods. You can eat what your family eats, including sweets once in a while. But you do have to pay attention to how often you eat and how much you eat of certain foods. You may want to work with a dietitian or a certified diabetes educator (CDE) to help you plan meals and snacks. A dietitian or CDE can also help you lose weight if that is one of your goals. What should you know about eating carbs? Managing the amount of carbohydrate (carbs) you eat is an important part of healthy meals when you have diabetes. Carbohydrate is found in many foods. · Learn which foods have carbs. And learn the amounts of carbs in different foods. ? Bread, cereal, pasta, and rice have about 15 grams of carbs in a serving. A serving is 1 slice of bread (1 ounce), ½ cup of cooked cereal, or 1/3 cup of cooked pasta or rice. ? Fruits have 15 grams of carbs in a serving. A serving is 1 small fresh fruit, such as an apple or orange; ½ of a banana; ½ cup of cooked or canned fruit; ½ cup of fruit juice; 1 cup of melon or raspberries; or 2 tablespoons of dried fruit. ? Milk and no-sugar-added yogurt have 15 grams of carbs in a serving. A serving is 1 cup of milk or 2/3 cup of no-sugar-added yogurt. ? Starchy vegetables have 15 grams of carbs in a serving. A serving is ½ cup of mashed potatoes or sweet potato; 1 cup winter squash; ½ of a small baked potato; ½ cup of cooked beans; or ½ cup cooked corn or green peas. · Learn how much carbs to eat each day and at each meal. A dietitian or CDE can teach you how to keep track of the amount of carbs you eat. This is called carbohydrate counting. · If you are not sure how to count carbohydrate grams, use the Plate Method to plan meals. It is a good, quick way to make sure that you have a balanced meal. It also helps you spread carbs throughout the day. ? Divide your plate by types of foods. Put non-starchy vegetables on half the plate, meat or other protein food on one-quarter of the plate, and a grain or starchy vegetable in the final quarter of the plate. To this you can add a small piece of fruit and 1 cup of milk or yogurt, depending on how many carbs you are supposed to eat at a meal.  · Try to eat about the same amount of carbs at each meal. Do not \"save up\" your daily allowance of carbs to eat at one meal.  · Proteins have very little or no carbs per serving. Examples of proteins are beef, chicken, turkey, fish, eggs, tofu, cheese, cottage cheese, and peanut butter. A serving size of meat is 3 ounces, which is about the size of a deck of cards. Examples of meat substitute serving sizes (equal to 1 ounce of meat) are 1/4 cup of cottage cheese, 1 egg, 1 tablespoon of peanut butter, and ½ cup of tofu. How can you eat out and still eat healthy? · Learn to estimate the serving sizes of foods that have carbohydrate. If you measure food at home, it will be easier to estimate the amount in a serving of restaurant food. · If the meal you order has too much carbohydrate (such as potatoes, corn, or baked beans), ask to have a low-carbohydrate food instead. Ask for a salad or green vegetables. · If you use insulin, check your blood sugar before and after eating out to help you plan how much to eat in the future. · If you eat more carbohydrate at a meal than you had planned, take a walk or do other exercise. This will help lower your blood sugar. What else should you know? · Limit saturated fat, such as the fat from meat and dairy products. This is a healthy choice because people who have diabetes are at higher risk of heart disease. So choose lean cuts of meat and nonfat or low-fat dairy products. Use olive or canola oil instead of butter or shortening when cooking. · Don't skip meals. Your blood sugar may drop too low if you skip meals and take insulin or certain medicines for diabetes. · Check with your doctor before you drink alcohol. Alcohol can cause your blood sugar to drop too low. Alcohol can also cause a bad reaction if you take certain diabetes medicines. Follow-up care is a key part of your treatment and safety. Be sure to make and go to all appointments, and call your doctor if you are having problems. It's also a good idea to know your test results and keep a list of the medicines you take. Where can you learn more? Go to https://ThermoAurapepicewkalie.Chobani. org and sign in to your [employer] account. Enter O153 in the [employer]vy box to learn more about \"Learning About Diabetes Food Guidelines. \"     If you do not have an account, please click on the \"Sign Up Now\" link. Current as of: December 20, 2019               Content Version: 12.6  © 5687-7923 [employer]. Care instructions adapted under license by [facility] ([facility]). If you have questions about a medical condition or this instruction, always ask your healthcare professional. [place] [address] any warranty or liability for your use of this information. Patient Education        Learning About Meal Planning for Diabetes  Why plan your meals? Meal planning can be a key part of managing diabetes. Planning meals and snacks with the right balance of carbohydrate, protein, and fat can help you keep your blood sugar at the target level you set with your doctor. You don't have to eat special foods. You can eat what your family eats, including sweets once in a while. But you do have to pay attention to how often you eat and how much you eat of certain foods. You may want to work with a dietitian or a certified diabetes educator. He or she can give you tips and meal ideas and can answer your questions about meal planning. This health professional can also help you reach a healthy weight if that is one of your goals. What plan is right for you? Your dietitian or diabetes educator may suggest that you start with the plate format or carbohydrate counting. The plate format  The plate format is a simple way to help you manage how you eat. You plan meals by learning how much space each food should take on a plate. Using the plate format helps you spread carbohydrate throughout the day. It can make it easier to keep your blood sugar level within your target range. It also helps you see if you're eating healthy portion sizes. To use the plate format, you put non-starchy vegetables on half your plate. Add meat or meat substitutes on one-quarter of the plate. Put a grain or starchy vegetable (such as brown rice or a potato) on the final quarter of the plate. You can add a small piece of fruit and some low-fat or fat-free milk or yogurt, depending on your carbohydrate goal for each meal.  Here are some tips for using the plate format:  · Make sure that you are not using an oversized plate. A 9-inch plate is best. Many restaurants use larger plates. · Get used to using the plate format at home. Then you can use it when you eat out. · Write down your questions about using the plate format. Talk to your doctor, a dietitian, or a diabetes educator about your concerns.   Carbohydrate counting If you take insulin, you need to know how many grams of carbohydrate are in a meal. This lets you know how much rapid-acting insulin to take before you eat. If you use an insulin pump, you get a constant rate of insulin during the day. So the pump must be programmed at meals to give you extra insulin to cover the rise in blood sugar after meals. When you know how much carbohydrate you will eat, you can take the right amount of insulin. Or, if you always use the same amount of insulin, you need to make sure that you eat the same amount of carbohydrate at meals. If you need more help to understand carbohydrate counting and food labels, ask your doctor, dietitian, or diabetes educator. How do you get started with meal planning? Here are some tips to get started:  · Plan your meals a week at a time. Don't forget to include snacks too. · Use cookbooks or online recipes to plan several main meals. Plan some quick meals for busy nights. You also can double some recipes that freeze well. Then you can save half for other busy nights when you don't have time to cook. · Make sure you have the ingredients you need for your recipes. If you're running low on basic items, put these items on your shopping list too. · List foods that you use to make breakfasts, lunches, and snacks. List plenty of fruits and vegetables. · Post this list on the refrigerator. Add to it as you think of more things you need. · Take the list to the store to do your weekly shopping. Follow-up care is a key part of your treatment and safety. Be sure to make and go to all appointments, and call your doctor if you are having problems. It's also a good idea to know your test results and keep a list of the medicines you take. Where can you learn more? Go to https://awa.healthWebupo. org and sign in to your [facility] account. Enter S268 in the [facility] box to learn more about \"Learning About Meal Planning for Diabetes. \" If you do not have an account, please click on the \"Sign Up Now\" link. Current as of: December 20, 2019               Content Version: 12.6  © 9310-4235 [employer]. Care instructions adapted under license by [facility] ([facility]). If you have questions about a medical condition or this instruction, always ask your healthcare professional. [address] any warranty or liability for your use of this information.

## 2021-02-23 ENCOUNTER — HOSPITAL ENCOUNTER (OUTPATIENT)
Dept: CT IMAGING | Age: 61
Discharge: HOME | End: 2021-02-23
Payer: MEDICAID

## 2021-02-23 ENCOUNTER — HOSPITAL ENCOUNTER (OUTPATIENT)
Dept: DIABETES SERVICES | Age: 61
Setting detail: THERAPIES SERIES
Discharge: HOME | End: 2021-02-23
Payer: MEDICAID

## 2021-02-23 ENCOUNTER — HOSPITAL ENCOUNTER (OUTPATIENT)
Dept: MAMMOGRAPHY | Age: 61
Discharge: HOME | End: 2021-02-25
Payer: MEDICAID

## 2021-02-23 VITALS — WEIGHT: 270 LBS | HEIGHT: 63 IN | TEMPERATURE: 98.5 F | BODY MASS INDEX: 47.84 KG/M2

## 2021-02-23 DIAGNOSIS — Z12.31 ENCOUNTER FOR SCREENING MAMMOGRAM FOR MALIGNANT NEOPLASM OF BREAST: ICD-10-CM

## 2021-02-23 DIAGNOSIS — R06.02 SHORTNESS OF BREATH: ICD-10-CM

## 2021-02-23 PROCEDURE — G0108 DIAB MANAGE TRN  PER INDIV: HCPCS

## 2021-02-23 PROCEDURE — 77063 BREAST TOMOSYNTHESIS BI: CPT

## 2021-02-23 PROCEDURE — 71250 CT THORAX DX C-: CPT

## 2021-02-23 ASSESSMENT — PROBLEM AREAS IN DIABETES QUESTIONNAIRE (PAID)
WORRYING ABOUT THE FUTURE AND THE POSSIBILITY OF SERIOUS COMPLICATIONS: 1
FEELING DEPRESSED WHEN YOU THINK ABOUT LIVING WITH DIABETES: 1
COPING WITH COMPLICATIONS OF DIABETES: 0

## 2021-02-23 NOTE — PROGRESS NOTES
Diabetes Self-Management Education Record    Participant Name: Anthony Guerrier  Referring Provider: Adam Diaz DO  Assessment/Evaluation Ratings:  1=Needs Instruction   4=Demonstrates Understanding/Competency  2=Needs Review   NC=Not Covered    3=Comprehends Key Points  N/A=Not Applicable  Topics/Learning Objectives Pre-session Assess Date:  Instructor initials/date  2/23/21 [facility]   Instr. Date    Instructor initials/date  2/23/21 [facility] Follow-up Post- session Eval Comments   Diabetes disease process & Treatment process:   -Define type of diabetes in simple terms.  - Describe the ABCs of  diabetes management  -Identify own type of diabetes  -Identify lifestyle changes/treatment options  -other:  1 [x] All     []  []  []  []  []  3 2/23/21 NERY: Newly dx type 2 diabetic w/ significant family hx. Developing strategies for Healthy coping/psychosocial issues:    -Describe feelings about living with diabetes  -Identify coping strategies and sources of stress  -Identify support needed & support network available  -Complete PAID-5 Diabetes questionnaire 1 [x] All     []  []  []    []  3 2/23/21 NERY: PAID 5=3            Prevention, detection & treatment of Chronic complications:    -Identify the prevention, detection and treatment for complications including immunizations, preventive eye, foot, dental and renal exams as indicated per the participant's duration of diabetes and health status.  -Define the natural course of diabetes and the relationship of blood glucose levels to long term complications of diabetes.   1 [x] All     []            []  3 2/23/21 [facility]   Prevention, detection & treatment of acute complications:    -State the causes,signs & symptoms of hyper & hypoglycemia, and prevention & treatment strategies.   -Describe sick day guidelines  DKA /indications for ketone testing &  when to call physician  1 [x] All     []      []    3   2/23/21 [facility]           -Identify severe weather/situation crisis  & diabetes supplies management  []      Using medications safely:   -State effects of diabetes medicines on blood glucose levels;  -List diabetes medication taken, action & side effects n/a [] All     []  []   2/23/21 [address]   Insulin/Injectables/glucagon  -Name appropriate injection sites; proper storage; supplies needed;  2   [x]  3 2/23/21 N[facility]: Lantus Pen    Demonstrate proper technique  [x]      Monitoring blood glucose, interpreting and using results:   -Identify the purpose of testing   -Identify recommended & personal blood glucose targets & HgbA1C target levels  -State the Importance of logging blood glucose levels for pattern recognition;   -State benefits of reading/using pt generated health data  -Verbalize safe lancet disposal 1 [x] All     []  []    []  []  []  3 2/23/21 N[facility]: Pt is testing 1x daily before breakfast. Ranging 350s-400s. Reviewed guidelines and provided log for pt to document on. A1C 13%   -Demonstrate proper testing technique  []      Incorporating physical activity into lifestyle:   -State effect of exercise on blood glucose levels;   -State benefits of regular exercise;   -Define safety considerations/food choices if needed.  -Describe contraindications/maintenance of activity. 1 [x] All     []  []    []  []  3 2/23/21 NERY: Patient is not active. Reviewed guidelines   Incorporating nutritional management into lifestyle:   -Describe effect of type, amount & timing of food on blood glucose  -Describe methods for preparing and planning healthy meals  -Correctly read food labels  -Name 3 foods high in Carbohydrate 1 [x] All       []    []    []  []  3/4 2/23/21 [address]: Instructed on 1500 calories ADA, 11 total CHO choices; 2 cho at breakfast, 3 cho at lunch and dinner, 1 cho choice at AM/PM/HS. Pt states she was consuming excessive carbohydrates prior to dx. States drinking soda daily and large portions. Since dx, pt has discontinued sugary beverage intakes and has switched to sugar-free alternatives and water. Reviewed label reading with pt and pt was able to convert label total carbohydrates into carbohydrate servings to coincide with meal plan. Discussed types of carbohydrates, meal timing, and consistency of carbohydrates. Reviewed weight goals, fiber intakes, low fat, and lean proteins. Encouraged pt to measure portions and call with any questions. Patient was very pleased at the end of session and stated she wants to follow meal plan. -Plan a carbohydrate-controlled meal based on individualized meal plan  -Demonstrate CHO counting/portion control   []  []      Developing strategies for problem solving to promote health/change behavior. -Identify 7 self-care behaviors; Personal health risk factors; Benefits, challenges & strategies for behavioral change and set an individualized goal selection. 1       [x]  3 2/23/21 [facility]   [x]Nutrition-Carb Couting  []Monitoring  []Exercise  []Medication  []Other:      Identified Barriers to learning/adherence to self management plan:    None  []  other    Instruction Method:  Lecture/Discussion and Handouts    Supporting Education Materials/Equipment Provided: Self-management manual, Meal Plan and Nutritional Packet   []Swedish materials       [] services     []Other:      Encounter Type Date Attended Start Time End Time Comments No Show Dates   Assessment          Session 1         Session 2        1:1 DSMES  2/23/21 [facility] 1100 1200 In person    In person Follow-up         Gestational Diabetes         Individual MNT        Meter Instrx        Insulin Instrx           Additional Comments: [x] Pt seen individually due to Covid-19 Safety precautions and no group session available.         Date:   Follow-up goal attainment based on patients initial DSMES goal    Dr Notified by [] EMR []Fax        []Post class Hgb A1C  []Medication compliance   []Plate method/meal plan compliance   []Able to state the number of Carbohydrate servings eaten at B,L,D   []Testing blood glucose as prescribed by PCP   []Exercise Routine   []Other:   []Other:     []Patient lost to follow-up  Dr Notified by []EMR []Fax     Personal Support Plan:      [] Keep all scheduled doctor appointments   [] Make and keep appointments with specialists (foot, eye, dentist) as recommended   [] Consult my pharmacist about all new medications or to ask any medication questions   [] Get tested for sleep apnea   [] Seek help for:   [] Make an appointment with:   [] Attend smoking cessation classes or call 1-800-QUIT-NOW  [] Attend Diabetes Support Group   [] Use diabetes magazines, books, or credible web-sites like the ADA for more information  [] Increase exercise at home or join an exercise program:   [] Other:

## 2021-02-23 NOTE — LETTER
[facility] Diabetes Education DSMES Follow-up Letter    Name: Irene Bauer  :   1960    Follow-up plan/Date:   2021               Thomas Gagnon     Dear Dr. Hao Mejia:    Thank you for referring  Irene Bauer  to [facility] Diabetes Education Services. Sharla Mir  has completed their personalized comprehensive education plan. The education plan included the following topics: Diabetes Disease Process, Nutrition, Exercise, Glucose Monitoring, Acute and Chronic Complication, Behavioral and Lifestyle Change, Healthy Coping and goal setting. We contact participants 3 months after attending our services to review their progress on their chosen goal.      The following area was chosen: [x] Healthy Eating       Selected goal outcome Post Education:     Patient states they met their goal 75% of time. Thank you for referring this patient to our program. Please do not hesitate to call if we can be of any service for this patient.      Jonny Nobles or Harman: Kleber: Fidelia    [facility] Diabetes Education Department  American Diabetes Association Recognized [facility] Program

## 2021-02-23 NOTE — LETTER
Comments: 2/23/21 N[facility]: Instructed on 1500 calories ADA, 11 total CHO choices; 2 cho at breakfast, 3 cho at lunch and dinner, 1 cho choice at AM/PM/HS. Pt states she was consuming excessive carbohydrates prior to dx. States drinking soda daily and large portions. Since dx, pt has discontinued sugary beverage intakes and has switched to sugar-free alternatives and water. Reviewed label reading with pt and pt was able to convert label total carbohydrates into carbohydrate servings to coincide with meal plan. Discussed types of carbohydrates, meal timing, and consistency of carbohydrates. Reviewed weight goals, fiber intakes, low fat, and lean proteins. Encouraged pt to measure portions and call with any questions. Patient was very pleased at the end of session and stated she wants to follow meal plan. PATIENT SELECTED GOAL:   [x]  I will follow my meal plan and measure my carbohydrate foods. []  I will increase my activity to:  []  I will check my blood glucose as ordered by my doctor. []  I will take my medications at the correct times as ordered by my doctor. []  Other:      DIABETES SELF-MANAGEMENT SUPPORT PLAN/REFERRALS (patient identified):  [] Keep my scheduled visits with my doctor   [] Make and keep appointments with specialists (foot, eye, dentist) as recommended  [] Consult my pharmacist with all new medications and/or any medication questions  [] Get tested for sleep apnea  [] Seek help for:   [] Make an appointment with:   [] Attend smoking cessation classes or call help-line (9-185-QUIT-NOW; [phone])  [] Attend a diabetes support group  [] Use diabetes magazines, books, or the American Diabetes Association website (www.diabetes. org) for more information    [] Start or increase exercising at home or join a program:  [] Other: There will be a follow-up in 3 months to evaluate the attainment of their chosen goal, and self identified support plan and you will be notified of their progress. Thank you for referring this patient to our program.  Please do not hesitate to call if you have any questions at [phone] [facility] or [facility]) or [phone] ([address] [place] [address]).         Sincerely,    Maira Mclean, MS RD LD [address] [facility] Diabetes Education Department  American Diabetes Association Recognized DSMES Program

## 2021-02-24 DIAGNOSIS — N60.01 BILATERAL BREAST CYSTS: Primary | ICD-10-CM

## 2021-02-24 DIAGNOSIS — N60.02 BILATERAL BREAST CYSTS: Primary | ICD-10-CM

## 2021-02-24 DIAGNOSIS — R92.8 ABNORMAL MAMMOGRAM: ICD-10-CM

## 2021-02-25 PROBLEM — E11.9 TYPE 2 DIABETES MELLITUS WITHOUT COMPLICATION, WITH LONG-TERM CURRENT USE OF INSULIN (HCC): Status: ACTIVE | Noted: 2021-02-25

## 2021-02-25 PROBLEM — R19.5 POSITIVE COLORECTAL CANCER SCREENING USING COLOGUARD TEST: Status: ACTIVE | Noted: 2021-02-25

## 2021-02-25 PROBLEM — N18.2 STAGE 2 CHRONIC KIDNEY DISEASE: Status: ACTIVE | Noted: 2021-02-25

## 2021-02-25 PROBLEM — Z79.4 TYPE 2 DIABETES MELLITUS WITHOUT COMPLICATION, WITH LONG-TERM CURRENT USE OF INSULIN (HCC): Status: ACTIVE | Noted: 2021-02-25

## 2021-02-25 ASSESSMENT — ENCOUNTER SYMPTOMS
RESPIRATORY NEGATIVE: 1
PHOTOPHOBIA: 0
COLOR CHANGE: 0
WHEEZING: 0
BACK PAIN: 0
CHOKING: 0
VOICE CHANGE: 0
SHORTNESS OF BREATH: 0
ABDOMINAL PAIN: 0
SINUS PAIN: 0
CONSTIPATION: 0
EYE DISCHARGE: 0
STRIDOR: 0
NAUSEA: 1
COUGH: 0
FACIAL SWELLING: 0
RHINORRHEA: 0
BLOOD IN STOOL: 0
ABDOMINAL DISTENTION: 0
DIARRHEA: 0
RECTAL PAIN: 0
EYE ITCHING: 0
SINUS PRESSURE: 0
EYE REDNESS: 0
CHEST TIGHTNESS: 0
ANAL BLEEDING: 0
VOMITING: 0
EYE PAIN: 0
EYES NEGATIVE: 1
SORE THROAT: 0
TROUBLE SWALLOWING: 0
ALLERGIC/IMMUNOLOGIC NEGATIVE: 1

## 2021-02-26 NOTE — PROGRESS NOTES
Bebeto Schafer is a 56 y.o. female. HPI/Chief C/O:  Chief Complaint   Patient presents with    Diabetes     ER follow up for elevated glucose. States sugars at home are getting better. States she was 373 at home today     No Known Allergies  This 56year old female presents for ED follow up from 2/12/2021 for glucose of 402. Pt has polyuria, and polydipsia. Pt has positive cologuard, CDK stage 2, hypertension, and hyperlipidemia. ED put pt on metformin. ROS:  Review of Systems   Constitutional: Positive for appetite change and fatigue. Negative for activity change, chills, diaphoresis, fever and unexpected weight change. HENT: Negative. Negative for congestion, dental problem, drooling, ear discharge, ear pain, facial swelling, hearing loss, mouth sores, nosebleeds, postnasal drip, rhinorrhea, sinus pressure, sinus pain, sneezing, sore throat, tinnitus, trouble swallowing and voice change. Eyes: Negative. Negative for photophobia, pain, discharge, redness, itching and visual disturbance. Respiratory: Negative. Negative for cough, choking, chest tightness, shortness of breath, wheezing and stridor. Cardiovascular: Negative. Negative for chest pain, palpitations and leg swelling. Gastrointestinal: Positive for nausea. Negative for abdominal distention, abdominal pain, anal bleeding, blood in stool, constipation, diarrhea, rectal pain and vomiting. Endocrine: Positive for polydipsia and polyuria. Negative for cold intolerance, heat intolerance and polyphagia. Genitourinary: Positive for frequency and urgency. Negative for decreased urine volume, difficulty urinating, dysuria, flank pain, genital sores, hematuria, menstrual problem and pelvic pain. Musculoskeletal: Negative. Negative for arthralgias, back pain, gait problem, joint swelling, myalgias, neck pain and neck stiffness. Skin: Negative. Negative for color change, pallor, rash and wound. Allergic/Immunologic: Negative. Neurological: Negative. Negative for dizziness, tremors, seizures, syncope, facial asymmetry, speech difficulty, weakness, light-headedness, numbness and headaches. Hematological: Negative. Negative for adenopathy. Does not bruise/bleed easily. Psychiatric/Behavioral: Negative for agitation, behavioral problems, confusion, decreased concentration, dysphoric mood, hallucinations, self-injury, sleep disturbance and suicidal ideas. The patient is nervous/anxious. The patient is not hyperactive. Past Medical/Surgical Hx;  Reviewed with patient      Diagnosis Date    [facility] of [facility] transfusion 9762    [facility] complication after kidney stone surgery    History of kidney stones     Hx of cardiovascular stress test 12/17/2019    Lexiscan stress test    Hypertension     Thyroid disease      Past Surgical History:   Procedure Laterality Date    CHOLECYSTECTOMY, LAPAROSCOPIC N/A 5/18/2020    LAPAROSCOPIC CHOLECYSTECTOMY WITH INTRAOPERATIVE CHOLANGIOGRAM performed by Yamilet Petty MD at [place] [address], VAGINAL      JOINT REPLACEMENT Right 2019    knee  [address]      incisional removal       Past Family Hx:  Reviewed with patient      Problem Relation Age of Onset   Mitchell Castillo Cancer Mother         lung    COPD Father        Social Hx:  Reviewed with patient  Social History     Tobacco Use    Smoking status: Never Smoker    Smokeless tobacco: Never Used   Substance Use Topics    Alcohol use: Not Currently       OBJECTIVE  /72   Pulse 74   Temp 98.1 °F (36.7 °C)   Ht 5' 3\" (1.6 m)   Wt 279 lb (126.6 kg)   LMP  (LMP Unknown)   SpO2 96%   BMI 49.42 kg/m²     Problem List:  Sharla does not have any pertinent problems on file. PHYS EX:  Physical Exam  Vitals signs and nursing note reviewed. Constitutional:       General: She is not in acute distress. Appearance: Normal appearance. She is well-developed. She is obese. She is not ill-appearing, toxic-appearing or diaphoretic. Comments: Patient has morbid obesity. Patient instructed on low calorie, healthy ADA diet. HENT:      Head: Normocephalic and atraumatic. Nose: Nose normal. No congestion or rhinorrhea. Eyes:      General: No scleral icterus. Right eye: No discharge. Left eye: No discharge. Conjunctiva/sclera: Conjunctivae normal.      Pupils: Pupils are equal, round, and reactive to light. Neck:      Musculoskeletal: Normal range of motion and neck supple. No neck rigidity or muscular tenderness. Thyroid: No thyromegaly. Vascular: No carotid bruit or JVD. Trachea: No tracheal deviation. Cardiovascular:      Rate and Rhythm: Normal rate and regular rhythm. Pulses: Normal pulses. Heart sounds: Normal heart sounds. No murmur. No friction rub. No gallop. Pulmonary:      Effort: Pulmonary effort is normal. No respiratory distress. Breath sounds: Normal breath sounds. No stridor. No wheezing, rhonchi or rales. Chest:      Chest wall: No tenderness. Abdominal:      General: Bowel sounds are normal. There is no distension. Palpations: Abdomen is soft. There is no mass. Tenderness: There is no abdominal tenderness. There is no right CVA tenderness, left CVA tenderness, guarding or rebound. Hernia: No hernia is present. Musculoskeletal:         General: Tenderness present. No swelling, deformity or signs of injury. Right lower leg: No edema. Left lower leg: No edema. Lymphadenopathy:      Cervical: No cervical adenopathy. Skin:     General: Skin is warm. Coloration: Skin is not jaundiced or pale. Findings: No bruising, erythema, lesion or rash. Neurological:      General: No focal deficit present. Mental Status: She is alert and oriented to person, place, and time. Cranial Nerves: No cranial nerve deficit. Sensory: No sensory deficit. Motor: No weakness or abnormal muscle tone. Coordination: Coordination normal.      Gait: Gait normal.      Deep Tendon Reflexes: Reflexes are normal and symmetric. Reflexes normal.   Psychiatric:         Mood and Affect: Mood normal.         Behavior: Behavior normal.         Thought Content: Thought content normal.         Judgment: Judgment normal.         ASSESSMENT/PLAN  Sharla was seen today for diabetes. Diagnoses and all orders for this visit:    Type 2 diabetes mellitus without complication, with long-term current use of insulin ([facility])  -     POCT Glucose  -     POCT glycosylated hemoglobin (Hb A1C)  -     insulin glargine (LANTUS SOLOSTAR) 100 UNIT/ML injection pen; Inject 10 Units into the skin nightly  -     [facility]  Not controlled. Lantus, ace, statin, aspirin. Positive colorectal cancer screening using Cologuard test  -     Elizabeth Plasencia MD, Gastroenterology, [place] ([place])  Not controlled. Stage 2 chronic kidney disease  Stable. Pt instructed NO NSAIDS, all examples given. Essential hypertension  Controlled. HCTZ, BB, ace, statin, aspirin, low salt diet. Acquired hypothyroidism  Controlled. Synthroid. Morbid obesity  Not controlled. Patient has morbid obesity. Patient instructed on low calorie, healthy diet. Pt instructed if any worse go ED ASAP.     Outpatient Encounter Medications as of 2/19/2021   Medication Sig Dispense Refill    insulin glargine (LANTUS SOLOSTAR) 100 UNIT/ML injection pen Inject 10 Units into the skin nightly 5 pen 3    hydroCHLOROthiazide (HYDRODIURIL) 25 MG tablet take 1 tablet by mouth once daily 90 tablet 1    levothyroxine (SYNTHROID) 75 MCG tablet take 1 tablet by mouth once daily 90 tablet 1    citalopram (CELEXA) 20 MG tablet take 1 tablet by mouth once daily 90 tablet 1  lisinopril (PRINIVIL;ZESTRIL) 40 MG tablet take 1 tablet by mouth once daily 90 tablet 1    metoprolol succinate (TOPROL XL) 100 MG extended release tablet take 1 tablet by mouth once daily 90 tablet 1    albuterol sulfate  (90 Base) MCG/ACT inhaler Inhale 2 puffs into the lungs every 6 hours as needed for Wheezing or Shortness of Breath 1 Inhaler 3    [DISCONTINUED] methylPREDNISolone (MEDROL DOSEPACK) 4 MG tablet Take 4 mg by mouth See Admin Instructions Take by mouth.  [DISCONTINUED] amoxicillin (AMOXIL) 500 MG capsule Take 500 mg by mouth 3 times daily      [DISCONTINUED] metFORMIN (GLUCOPHAGE) 500 MG tablet Take 1 tablet by mouth 2 times daily (with meals) 60 tablet 0    hydrocortisone 2.5 % cream Apply topically 2 times daily. (Patient not taking: Reported on 2/19/2021) 1 Tube 0    ibuprofen (IBU) 800 MG tablet Take 1 tablet by mouth every 8 hours as needed for Pain 270 tablet 0    [DISCONTINUED] potassium citrate (UROCIT-K) 10 MEQ (1080 MG) extended release tablet Take 1 tablet by mouth 2 times daily  0     No facility-administered encounter medications on file as of 2/19/2021. Return in about 1 week (around 2/26/2021).         Reviewed recent labs related to Sharla's current problems      Discussed importance of regular Health Maintenance follow up  Health Maintenance   Topic    Pneumococcal 0-64 years Vaccine (1 of 1 - PPSV23)    Diabetic foot exam     HIV screen     Shingles Vaccine (1 of 2)    Diabetic retinal exam     Flu vaccine (1)    Diabetic microalbuminuria test     A1C test (Diabetic or Prediabetic)     Lipid screen     TSH testing     Potassium monitoring     Creatinine monitoring     Breast cancer screen     Colon cancer screen fecal DNA test (Cologuard)     DTaP/Tdap/Td vaccine (3 - Td)    Hepatitis C screen     Hepatitis A vaccine     Hib vaccine     Meningococcal (ACWY) vaccine

## 2021-03-01 ENCOUNTER — OFFICE VISIT (OUTPATIENT)
Dept: FAMILY MEDICINE CLINIC | Age: 61
End: 2021-03-01
Payer: MEDICAID

## 2021-03-01 VITALS
HEIGHT: 63 IN | WEIGHT: 276 LBS | BODY MASS INDEX: 48.9 KG/M2 | SYSTOLIC BLOOD PRESSURE: 124 MMHG | RESPIRATION RATE: 16 BRPM | TEMPERATURE: 97.7 F | DIASTOLIC BLOOD PRESSURE: 72 MMHG | HEART RATE: 96 BPM | OXYGEN SATURATION: 96 %

## 2021-03-01 DIAGNOSIS — I10 ESSENTIAL HYPERTENSION: ICD-10-CM

## 2021-03-01 DIAGNOSIS — R19.5 POSITIVE COLORECTAL CANCER SCREENING USING COLOGUARD TEST: ICD-10-CM

## 2021-03-01 DIAGNOSIS — E03.9 ACQUIRED HYPOTHYROIDISM: ICD-10-CM

## 2021-03-01 DIAGNOSIS — Z79.4 TYPE 2 DIABETES MELLITUS WITHOUT COMPLICATION, WITH LONG-TERM CURRENT USE OF INSULIN (HCC): Primary | ICD-10-CM

## 2021-03-01 DIAGNOSIS — N18.2 STAGE 2 CHRONIC KIDNEY DISEASE: ICD-10-CM

## 2021-03-01 DIAGNOSIS — E11.9 TYPE 2 DIABETES MELLITUS WITHOUT COMPLICATION, WITH LONG-TERM CURRENT USE OF INSULIN (HCC): Primary | ICD-10-CM

## 2021-03-01 LAB
CHP ED QC CHECK: NORMAL
GLUCOSE BLD-MCNC: 290 MG/DL

## 2021-03-01 PROCEDURE — 2022F DILAT RTA XM EVC RTNOPTHY: CPT | Performed by: FAMILY MEDICINE

## 2021-03-01 PROCEDURE — G8484 FLU IMMUNIZE NO ADMIN: HCPCS | Performed by: FAMILY MEDICINE

## 2021-03-01 PROCEDURE — G8427 DOCREV CUR MEDS BY ELIG CLIN: HCPCS | Performed by: FAMILY MEDICINE

## 2021-03-01 PROCEDURE — 3046F HEMOGLOBIN A1C LEVEL >9.0%: CPT | Performed by: FAMILY MEDICINE

## 2021-03-01 PROCEDURE — G8417 CALC BMI ABV UP PARAM F/U: HCPCS | Performed by: FAMILY MEDICINE

## 2021-03-01 PROCEDURE — 3017F COLORECTAL CA SCREEN DOC REV: CPT | Performed by: FAMILY MEDICINE

## 2021-03-01 PROCEDURE — 82962 GLUCOSE BLOOD TEST: CPT | Performed by: FAMILY MEDICINE

## 2021-03-01 PROCEDURE — 1036F TOBACCO NON-USER: CPT | Performed by: FAMILY MEDICINE

## 2021-03-01 PROCEDURE — 99213 OFFICE O/P EST LOW 20 MIN: CPT | Performed by: FAMILY MEDICINE

## 2021-03-01 RX ORDER — ASPIRIN 81 MG/1
81 TABLET ORAL DAILY
Qty: 90 TABLET | Refills: 1 | Status: SHIPPED
Start: 2021-03-01 | End: 2021-11-05 | Stop reason: SDUPTHER

## 2021-03-01 RX ORDER — INSULIN GLARGINE 100 [IU]/ML
14 INJECTION, SOLUTION SUBCUTANEOUS NIGHTLY
Qty: 5 PEN | Refills: 3
Start: 2021-03-01 | End: 2021-03-04 | Stop reason: SDUPTHER

## 2021-03-03 ENCOUNTER — PATIENT MESSAGE (OUTPATIENT)
Dept: FAMILY MEDICINE CLINIC | Age: 61
End: 2021-03-03

## 2021-03-03 DIAGNOSIS — E11.9 TYPE 2 DIABETES MELLITUS WITHOUT COMPLICATION, WITH LONG-TERM CURRENT USE OF INSULIN (HCC): ICD-10-CM

## 2021-03-03 DIAGNOSIS — Z79.4 TYPE 2 DIABETES MELLITUS WITHOUT COMPLICATION, WITH LONG-TERM CURRENT USE OF INSULIN (HCC): ICD-10-CM

## 2021-03-04 ENCOUNTER — HOSPITAL ENCOUNTER (OUTPATIENT)
Dept: ULTRASOUND IMAGING | Age: 61
Discharge: HOME | End: 2021-03-04
Payer: MEDICAID

## 2021-03-04 DIAGNOSIS — N60.01 BILATERAL BREAST CYSTS: ICD-10-CM

## 2021-03-04 DIAGNOSIS — N60.02 BILATERAL BREAST CYSTS: ICD-10-CM

## 2021-03-04 DIAGNOSIS — R92.8 ABNORMAL MAMMOGRAM: ICD-10-CM

## 2021-03-04 PROCEDURE — 76641 ULTRASOUND BREAST COMPLETE: CPT

## 2021-03-04 RX ORDER — INSULIN GLARGINE 100 [IU]/ML
14 INJECTION, SOLUTION SUBCUTANEOUS NIGHTLY
Qty: 5 PEN | Refills: 3 | Status: SHIPPED
Start: 2021-03-04 | End: 2021-08-13 | Stop reason: SDUPTHER

## 2021-03-05 ASSESSMENT — ENCOUNTER SYMPTOMS
EYE PAIN: 0
EYE DISCHARGE: 0
COLOR CHANGE: 0
VOMITING: 0
EYE REDNESS: 0
STRIDOR: 0
DIARRHEA: 0
SHORTNESS OF BREATH: 0
TROUBLE SWALLOWING: 0
SINUS PAIN: 0
NAUSEA: 0
BLOOD IN STOOL: 1
WHEEZING: 0
ABDOMINAL PAIN: 0
ALLERGIC/IMMUNOLOGIC NEGATIVE: 1
ABDOMINAL DISTENTION: 0
FACIAL SWELLING: 0
RECTAL PAIN: 0
CHOKING: 0
EYES NEGATIVE: 1
SINUS PRESSURE: 0
COUGH: 0
ANAL BLEEDING: 0
PHOTOPHOBIA: 0
CONSTIPATION: 0
RESPIRATORY NEGATIVE: 1
BACK PAIN: 0
VOICE CHANGE: 0
EYE ITCHING: 0
SORE THROAT: 0
RHINORRHEA: 0
CHEST TIGHTNESS: 0

## 2021-03-05 NOTE — PROGRESS NOTES
Leonardo Cedillo is a 56 y.o. female. HPI/Chief C/O:  Chief Complaint   Patient presents with    Diabetes     1 week follow up ; patient states her sugars are not going down much, if at all. No Known Allergies  This 56year old female presents for ED follow up from 2/12/2021 for glucose of 402. Pt has polyuria, and polydipsia. Pt has positive cologuard, CDK stage 2, hypertension, and hyperlipidemia. ED put pt on metformin, last visit insulin was added. Pt states she feels better. ROS:  Review of Systems   Constitutional: Positive for fatigue. Negative for activity change, appetite change, chills, diaphoresis, fever and unexpected weight change. HENT: Negative. Negative for congestion, dental problem, drooling, ear discharge, ear pain, facial swelling, hearing loss, mouth sores, nosebleeds, postnasal drip, rhinorrhea, sinus pressure, sinus pain, sneezing, sore throat, tinnitus, trouble swallowing and voice change. Eyes: Negative. Negative for photophobia, pain, discharge, redness, itching and visual disturbance. Respiratory: Negative. Negative for cough, choking, chest tightness, shortness of breath, wheezing and stridor. Cardiovascular: Negative. Negative for chest pain, palpitations and leg swelling. Gastrointestinal: Positive for blood in stool. Negative for abdominal distention, abdominal pain, anal bleeding, constipation, diarrhea, nausea, rectal pain and vomiting. Endocrine: Positive for polydipsia and polyuria. Negative for cold intolerance, heat intolerance and polyphagia. Genitourinary: Positive for frequency and urgency. Negative for decreased urine volume, difficulty urinating, dysuria, flank pain, genital sores, hematuria, menstrual problem and pelvic pain. Musculoskeletal: Negative. Negative for arthralgias, back pain, gait problem, joint swelling, myalgias, neck pain and neck stiffness. Skin: Negative. Negative for color change, pallor, rash and wound. Allergic/Immunologic: Negative. Neurological: Negative. Negative for dizziness, tremors, seizures, syncope, facial asymmetry, speech difficulty, weakness, light-headedness, numbness and headaches. Hematological: Negative. Negative for adenopathy. Does not bruise/bleed easily. Psychiatric/Behavioral: Negative for agitation, behavioral problems, confusion, decreased concentration, dysphoric mood, hallucinations, self-injury, sleep disturbance and suicidal ideas. The patient is nervous/anxious. The patient is not hyperactive. Past Medical/Surgical Hx;  Reviewed with patient      Diagnosis Date    [facility] of blood transfusion 6521    at [facility] complication after kidney stone surgery    History of kidney stones     Hx of cardiovascular stress test 12/17/2019    Lexiscan stress test    Hypertension     Thyroid disease      Past Surgical History:   Procedure Laterality Date    CHOLECYSTECTOMY, LAPAROSCOPIC N/A 5/18/2020    LAPAROSCOPIC CHOLECYSTECTOMY WITH INTRAOPERATIVE CHOLANGIOGRAM performed by Olena Martin MD at [place] [address], VAGINAL      JOINT REPLACEMENT Right 2019    knee  [address]      incisional removal       Past Family Hx:  Reviewed with patient      Problem Relation Age of Onset   Rachelle Contreras Cancer Mother         lung    COPD Father        Social Hx:  Reviewed with patient  Social History     Tobacco Use    Smoking status: Never Smoker    Smokeless tobacco: Never Used   Substance Use Topics    Alcohol use: Not Currently       OBJECTIVE  /72   Pulse 96   Temp 97.7 °F (36.5 °C) (Temporal)   Resp 16   Ht 5' 3\" (1.6 m)   Wt 276 lb (125.2 kg)   LMP  (LMP Unknown)   SpO2 96%   BMI 48.89 kg/m²     Problem List:  Sharla does not have any pertinent problems on file. PHYS EX:  Physical Exam  Vitals signs and nursing note reviewed. Constitutional:       General: She is not in acute distress. Appearance: Normal appearance. She is well-developed. She is obese. She is not ill-appearing, toxic-appearing or diaphoretic. Comments: Patient has morbid obesity. Patient instructed on low calorie, healthy ADA diet. HENT:      Head: Normocephalic and atraumatic. Nose: Nose normal. No congestion or rhinorrhea. Eyes:      General: No scleral icterus. Right eye: No discharge. Left eye: No discharge. Conjunctiva/sclera: Conjunctivae normal.      Pupils: Pupils are equal, round, and reactive to light. Neck:      Musculoskeletal: Normal range of motion and neck supple. No neck rigidity or muscular tenderness. Thyroid: No thyromegaly. Vascular: No carotid bruit or JVD. Trachea: No tracheal deviation. Cardiovascular:      Rate and Rhythm: Normal rate and regular rhythm. Pulses: Normal pulses. Heart sounds: Normal heart sounds. No murmur. No friction rub. No gallop. Pulmonary:      Effort: Pulmonary effort is normal. No respiratory distress. Breath sounds: Normal breath sounds. No stridor. No wheezing, rhonchi or rales. Chest:      Chest wall: No tenderness. Abdominal:      General: Bowel sounds are normal. There is no distension. Palpations: Abdomen is soft. There is no mass. Tenderness: There is no abdominal tenderness. There is no right CVA tenderness, left CVA tenderness, guarding or rebound. Hernia: No hernia is present. Musculoskeletal:         General: Tenderness present. No swelling, deformity or signs of injury. Right lower leg: No edema. Left lower leg: No edema. Lymphadenopathy:      Cervical: No cervical adenopathy. Skin:     General: Skin is warm. Coloration: Skin is not jaundiced or pale. Findings: No bruising, erythema, lesion or rash. Neurological:      General: No focal deficit present. Mental Status: She is alert and oriented to person, place, and time. Cranial Nerves:  No cranial nerve deficit. Sensory: No sensory deficit. Motor: No weakness or abnormal muscle tone. Coordination: Coordination normal.      Gait: Gait normal.      Deep Tendon Reflexes: Reflexes are normal and symmetric. Reflexes normal.   Psychiatric:         Mood and Affect: Mood normal.         Behavior: Behavior normal.         Thought Content: Thought content normal.         Judgment: Judgment normal.         ASSESSMENT/PLAN  Sharla was seen today for diabetes. Diagnoses and all orders for this visit:    Type 2 diabetes mellitus without complication, with long-term current use of insulin ([facility])  -     POCT Glucose  -     POCT glycosylated hemoglobin (Hb A1C)  -     insulin glargine (LANTUS SOLOSTAR) 100 UNIT/ML injection pen; Inject 14 Units into the skin nightly  -     [facility] [place]  Not controlled. Lantus, ace, statin, aspirin. Positive colorectal cancer screening using Cologuard test  -     John Max MD, Gastroenterology, [facility] ([facility])  Not controlled. Stage 2 chronic kidney disease  Stable. Pt instructed NO NSAIDS, all examples given. Essential hypertension  Controlled. HCTZ, BB, ace, statin, aspirin, low salt diet. Acquired hypothyroidism  Controlled. Synthroid. Morbid obesity  Not controlled. Patient has morbid obesity. Patient instructed on low calorie, healthy diet. Pt instructed if any worse go ED ASAP.     Outpatient Encounter Medications as of 3/1/2021   Medication Sig Dispense Refill    aspirin EC 81 MG EC tablet Take 1 tablet by mouth daily 90 tablet 1    Insulin Pen Needle 31G X 5 MM MISC 1 each by Does not apply route daily 100 each 3    [DISCONTINUED] insulin glargine (LANTUS SOLOSTAR) 100 UNIT/ML injection pen Inject 14 Units into the skin nightly 5 pen 3    hydroCHLOROthiazide (HYDRODIURIL) 25 MG tablet take 1 tablet by mouth once daily 90 tablet 1    levothyroxine (SYNTHROID) 75 MCG tablet take 1 tablet by mouth once daily 90 tablet 1  citalopram (CELEXA) 20 MG tablet take 1 tablet by mouth once daily 90 tablet 1    lisinopril (PRINIVIL;ZESTRIL) 40 MG tablet take 1 tablet by mouth once daily 90 tablet 1    metoprolol succinate (TOPROL XL) 100 MG extended release tablet take 1 tablet by mouth once daily 90 tablet 1    albuterol sulfate  (90 Base) MCG/ACT inhaler Inhale 2 puffs into the lungs every 6 hours as needed for Wheezing or Shortness of Breath 1 Inhaler 3    ibuprofen (IBU) 800 MG tablet Take 1 tablet by mouth every 8 hours as needed for Pain 270 tablet 0    [DISCONTINUED] insulin glargine (LANTUS SOLOSTAR) 100 UNIT/ML injection pen Inject 10 Units into the skin nightly 5 pen 3    hydrocortisone 2.5 % cream Apply topically 2 times daily. (Patient not taking: Reported on 2/19/2021) 1 Tube 0     No facility-administered encounter medications on file as of 3/1/2021. Return in about 2 weeks (around 3/15/2021).         Reviewed recent labs related to Sharla's current problems      Discussed importance of regular Health Maintenance follow up  Health Maintenance   Topic    Pneumococcal 0-64 years Vaccine (1 of 1 - PPSV23)    Diabetic foot exam     HIV screen     COVID-19 Vaccine (1 of 2)    Shingles Vaccine (1 of 2)    Diabetic retinal exam     Flu vaccine (1)    A1C test (Diabetic or Prediabetic)     Lipid screen     TSH testing     Potassium monitoring     Creatinine monitoring     Breast cancer screen     Colon cancer screen fecal DNA test (Cologuard)     DTaP/Tdap/Td vaccine (3 - Td)    Hepatitis C screen     Hepatitis A vaccine     Hib vaccine     Meningococcal (ACWY) vaccine

## 2021-03-09 ENCOUNTER — TELEPHONE (OUTPATIENT)
Dept: FAMILY MEDICINE CLINIC | Age: 61
End: 2021-03-09

## 2021-03-09 RX ORDER — PRAVASTATIN SODIUM 10 MG
10 TABLET ORAL DAILY
Qty: 30 TABLET | Refills: 2 | Status: SHIPPED
Start: 2021-03-09 | End: 2021-08-12

## 2021-03-09 NOTE — TELEPHONE ENCOUNTER
Patient notified and script sent to the pharmacy.   Electronically signed by Cooper Florez on 3/9/2021 at 11:20 AM

## 2021-03-09 NOTE — TELEPHONE ENCOUNTER
This MA attempted to reach pt. No answer. This MA left message for pt requesting return call to discuss results.   Electronically signed by Teddy Lai on 3/9/21 at 11:08 AM EST

## 2021-03-09 NOTE — TELEPHONE ENCOUNTER
----- Message from Srini Romero DO sent at 3/5/2021  3:09 PM EST -----  Call pt and tell her she should be on low dose statin due to DM    send in to pharmacy pravastatin 10 mg number 30  one tablet daily  2 refills  if OK with pt.     Let me know

## 2021-03-15 ENCOUNTER — OFFICE VISIT (OUTPATIENT)
Dept: FAMILY MEDICINE CLINIC | Age: 61
End: 2021-03-15
Payer: MEDICAID

## 2021-03-15 VITALS
RESPIRATION RATE: 16 BRPM | TEMPERATURE: 97.6 F | WEIGHT: 278 LBS | HEART RATE: 80 BPM | HEIGHT: 63 IN | BODY MASS INDEX: 49.26 KG/M2 | DIASTOLIC BLOOD PRESSURE: 84 MMHG | OXYGEN SATURATION: 95 % | SYSTOLIC BLOOD PRESSURE: 122 MMHG

## 2021-03-15 DIAGNOSIS — E11.9 TYPE 2 DIABETES MELLITUS WITHOUT COMPLICATION, WITH LONG-TERM CURRENT USE OF INSULIN (HCC): Primary | ICD-10-CM

## 2021-03-15 DIAGNOSIS — E03.9 ACQUIRED HYPOTHYROIDISM: ICD-10-CM

## 2021-03-15 DIAGNOSIS — R06.02 SHORTNESS OF BREATH: ICD-10-CM

## 2021-03-15 DIAGNOSIS — E78.2 MIXED HYPERLIPIDEMIA: ICD-10-CM

## 2021-03-15 DIAGNOSIS — Z79.4 TYPE 2 DIABETES MELLITUS WITHOUT COMPLICATION, WITH LONG-TERM CURRENT USE OF INSULIN (HCC): Primary | ICD-10-CM

## 2021-03-15 DIAGNOSIS — N60.01 BILATERAL BREAST CYSTS: ICD-10-CM

## 2021-03-15 DIAGNOSIS — I10 ESSENTIAL HYPERTENSION: ICD-10-CM

## 2021-03-15 DIAGNOSIS — N18.5 CHRONIC KIDNEY DISEASE, STAGE 5 (HCC): ICD-10-CM

## 2021-03-15 DIAGNOSIS — N60.02 BILATERAL BREAST CYSTS: ICD-10-CM

## 2021-03-15 DIAGNOSIS — R00.2 PALPITATIONS: ICD-10-CM

## 2021-03-15 DIAGNOSIS — N64.4 BREAST PAIN: ICD-10-CM

## 2021-03-15 DIAGNOSIS — R19.5 POSITIVE COLORECTAL CANCER SCREENING USING COLOGUARD TEST: ICD-10-CM

## 2021-03-15 LAB
CHP ED QC CHECK: NORMAL
GLUCOSE BLD-MCNC: 211 MG/DL

## 2021-03-15 PROCEDURE — G8427 DOCREV CUR MEDS BY ELIG CLIN: HCPCS | Performed by: FAMILY MEDICINE

## 2021-03-15 PROCEDURE — G8417 CALC BMI ABV UP PARAM F/U: HCPCS | Performed by: FAMILY MEDICINE

## 2021-03-15 PROCEDURE — 1036F TOBACCO NON-USER: CPT | Performed by: FAMILY MEDICINE

## 2021-03-15 PROCEDURE — 3017F COLORECTAL CA SCREEN DOC REV: CPT | Performed by: FAMILY MEDICINE

## 2021-03-15 PROCEDURE — G8484 FLU IMMUNIZE NO ADMIN: HCPCS | Performed by: FAMILY MEDICINE

## 2021-03-15 PROCEDURE — 82962 GLUCOSE BLOOD TEST: CPT | Performed by: FAMILY MEDICINE

## 2021-03-15 PROCEDURE — 3046F HEMOGLOBIN A1C LEVEL >9.0%: CPT | Performed by: FAMILY MEDICINE

## 2021-03-15 PROCEDURE — 2022F DILAT RTA XM EVC RTNOPTHY: CPT | Performed by: FAMILY MEDICINE

## 2021-03-15 PROCEDURE — 99214 OFFICE O/P EST MOD 30 MIN: CPT | Performed by: FAMILY MEDICINE

## 2021-03-17 ENCOUNTER — TELEPHONE (OUTPATIENT)
Dept: BREAST CENTER | Age: 61
End: 2021-03-17

## 2021-03-17 NOTE — TELEPHONE ENCOUNTER
Patient is a new referral. Left detailed message with call back number to discuss referral information and obtain appointment for patient. Will await call back.

## 2021-03-20 PROBLEM — N60.02 BILATERAL BREAST CYSTS: Status: ACTIVE | Noted: 2021-03-20

## 2021-03-20 PROBLEM — N64.4 BREAST PAIN: Status: ACTIVE | Noted: 2021-03-20

## 2021-03-20 PROBLEM — N60.01 BILATERAL BREAST CYSTS: Status: ACTIVE | Noted: 2021-03-20

## 2021-03-20 PROBLEM — N18.5 CHRONIC KIDNEY DISEASE, STAGE 5 (HCC): Status: ACTIVE | Noted: 2021-03-20

## 2021-03-20 PROBLEM — R06.02 SHORTNESS OF BREATH: Status: ACTIVE | Noted: 2021-03-20

## 2021-03-20 ASSESSMENT — ENCOUNTER SYMPTOMS
RHINORRHEA: 0
CHOKING: 0
TROUBLE SWALLOWING: 0
ABDOMINAL PAIN: 0
DIARRHEA: 0
VOMITING: 0
FACIAL SWELLING: 0
ANAL BLEEDING: 0
EYE REDNESS: 0
BACK PAIN: 0
STRIDOR: 0
SINUS PRESSURE: 0
NAUSEA: 0
WHEEZING: 0
COLOR CHANGE: 0
SINUS PAIN: 0
ALLERGIC/IMMUNOLOGIC NEGATIVE: 1
CONSTIPATION: 0
RECTAL PAIN: 0
PHOTOPHOBIA: 0
EYE DISCHARGE: 0
COUGH: 0
BLOOD IN STOOL: 1
CHEST TIGHTNESS: 0
SORE THROAT: 0
EYES NEGATIVE: 1
EYE ITCHING: 0
ABDOMINAL DISTENTION: 0
SHORTNESS OF BREATH: 1
VOICE CHANGE: 0
EYE PAIN: 0

## 2021-03-20 NOTE — PROGRESS NOTES
Gino Santiago is a 56 y.o. female. HPI/Chief C/O:  Chief Complaint   Patient presents with    Diabetes     2 week recheck     No Known Allergies  This 56year old female presents for ED follow up from 2/12/2021 for glucose of 402. Glucose id down to 290. Pt has positive cologuard, CDK stage 2, hypertension, and hyperlipidemia. ED put pt on metformin, last visit insulin was added. Pt states she feels better. Pt has pamela. Breast cysts with pain, hypertension, hyperlipidemia, hypothyroid, positive cologuard, and fatigue. Pt c/o palpitation, and shortness og breast. Pt denies chest pain. ROS:  Review of Systems   Constitutional: Positive for fatigue. Negative for activity change, appetite change, chills, diaphoresis, fever and unexpected weight change. HENT: Negative. Negative for congestion, dental problem, drooling, ear discharge, ear pain, facial swelling, hearing loss, mouth sores, nosebleeds, postnasal drip, rhinorrhea, sinus pressure, sinus pain, sneezing, sore throat, tinnitus, trouble swallowing and voice change. Eyes: Negative. Negative for photophobia, pain, discharge, redness, itching and visual disturbance. Respiratory: Positive for shortness of breath. Negative for cough, choking, chest tightness, wheezing and stridor. Cardiovascular: Negative. Negative for chest pain, palpitations and leg swelling. Gastrointestinal: Positive for blood in stool. Negative for abdominal distention, abdominal pain, anal bleeding, constipation, diarrhea, nausea, rectal pain and vomiting. Endocrine: Negative for cold intolerance, heat intolerance, polydipsia, polyphagia and polyuria. Genitourinary: Positive for frequency and urgency. Negative for decreased urine volume, difficulty urinating, dysuria, flank pain, genital sores, hematuria, menstrual problem and pelvic pain. Musculoskeletal: Positive for arthralgias and myalgias.  Negative for back pain, gait problem, joint swelling, neck pain reviewed. Constitutional:       General: She is not in acute distress. Appearance: Normal appearance. She is well-developed. She is obese. She is not ill-appearing, toxic-appearing or diaphoretic. Comments: Patient has morbid obesity. Patient instructed on low calorie, healthy ADA diet. HENT:      Head: Normocephalic and atraumatic. Nose: Nose normal. No congestion or rhinorrhea. Eyes:      General: No scleral icterus. Right eye: No discharge. Left eye: No discharge. Conjunctiva/sclera: Conjunctivae normal.      Pupils: Pupils are equal, round, and reactive to light. Neck:      Musculoskeletal: Normal range of motion and neck supple. No neck rigidity or muscular tenderness. Thyroid: No thyromegaly. Vascular: No carotid bruit or JVD. Trachea: No tracheal deviation. Cardiovascular:      Rate and Rhythm: Normal rate and regular rhythm. Pulses: Normal pulses. Heart sounds: Normal heart sounds. No murmur. No friction rub. No gallop. Pulmonary:      Effort: Pulmonary effort is normal. No respiratory distress. Breath sounds: Normal breath sounds. No stridor. No wheezing, rhonchi or rales. Chest:      Chest wall: No tenderness. Abdominal:      General: Bowel sounds are normal. There is no distension. Palpations: Abdomen is soft. There is no mass. Tenderness: There is no abdominal tenderness. There is no right CVA tenderness, left CVA tenderness, guarding or rebound. Hernia: No hernia is present. Musculoskeletal:         General: Tenderness present. No swelling, deformity or signs of injury. Right lower leg: No edema. Left lower leg: No edema. Comments: Pain and decreased ROM multiple joints. Lymphadenopathy:      Cervical: No cervical adenopathy. Skin:     General: Skin is warm. Coloration: Skin is not jaundiced or pale. Findings: No bruising, erythema, lesion or rash.    Neurological: General: No focal deficit present. Mental Status: She is alert and oriented to person, place, and time. Cranial Nerves: No cranial nerve deficit. Sensory: No sensory deficit. Motor: No weakness or abnormal muscle tone. Coordination: Coordination normal.      Gait: Gait normal.      Deep Tendon Reflexes: Reflexes are normal and symmetric. Reflexes normal.   Psychiatric:         Mood and Affect: Mood normal.         Behavior: Behavior normal.         Thought Content: Thought content normal.         Judgment: Judgment normal.         ASSESSMENT/PLAN  Sharla was seen today for diabetes. Diagnoses and all orders for this visit:    Type 2 diabetes mellitus without complication, with long-term current use of insulin ([facility])  -     POCT Glucose  -     POCT glycosylated hemoglobin (Hb A1C)  -     insulin glargine (LANTUS SOLOSTAR) 100 UNIT/ML injection pen; Inject 14 Units into the skin nightly  -     [facility]  Not controlled. Lantus, ace, statin, aspirin, ADA diet. Positive colorectal cancer screening using Cologuard test  -     Margaretha Meigs, MD, Gastroenterology, [facility] ([facility])  Not controlled. Stage 2 chronic kidney disease  Stable. Pt instructed NO NSAIDS, all examples given. Essential hypertension  Controlled. HCTZ, BB, ace, statin, aspirin, low salt diet. Acquired hypothyroidism  Controlled. Synthroid. Hyperlipidemia  Not controlled. Low chol. Diet, statin. Pamela. Breast cysts, abnormal pamela. [place]  Not conrolled. Breast specialist.    Palpitations  Stable. Cardiology    Shortness of breath  Stable. Pulmonology. Morbid obesity  Not controlled. Patient has morbid obesity. Patient instructed on low calorie, healthy diet. CKD stage 5  Improving. Pt instructed if any worse go ED ASAP.     Outpatient Encounter Medications as of 3/15/2021   Medication Sig Dispense Refill    pravastatin (PRAVACHOL) 10 MG tablet Take 1 tablet by mouth daily 30 tablet 2   

## 2021-03-22 ENCOUNTER — HOSPITAL ENCOUNTER (OUTPATIENT)
Age: 61
Discharge: HOME | End: 2021-03-22
Payer: MEDICAID

## 2021-03-22 ENCOUNTER — TELEPHONE (OUTPATIENT)
Dept: BREAST CENTER | Age: 61
End: 2021-03-22

## 2021-03-22 LAB
ALBUMIN SERPL-MCNC: 4.4 G/DL (ref 3.5–5.2)
ALP BLD-CCNC: 114 U/L (ref 35–104)
ALT SERPL-CCNC: 55 U/L (ref 0–32)
ANION GAP SERPL CALCULATED.3IONS-SCNC: 12 MMOL/L (ref 7–16)
AST SERPL-CCNC: 65 U/L (ref 0–31)
BILIRUB SERPL-MCNC: 0.6 MG/DL (ref 0–1.2)
BUN BLDV-MCNC: 15 MG/DL (ref 8–23)
CALCIUM SERPL-MCNC: 9.9 MG/DL (ref 8.6–10.2)
CHLORIDE BLD-SCNC: 100 MMOL/L (ref 98–107)
CO2: 26 MMOL/L (ref 22–29)
CREAT SERPL-MCNC: 1 MG/DL (ref 0.5–1)
GFR AFRICAN AMERICAN: >60
GFR NON-AFRICAN AMERICAN: 56 ML/MIN/1.73
GLUCOSE BLD-MCNC: 129 MG/DL (ref 74–99)
POTASSIUM SERPL-SCNC: 4 MMOL/L (ref 3.5–5)
SODIUM BLD-SCNC: 138 MMOL/L (ref 132–146)
TOTAL PROTEIN: 8.1 G/DL (ref 6.4–8.3)

## 2021-03-22 PROCEDURE — 36415 COLL VENOUS BLD VENIPUNCTURE: CPT

## 2021-03-22 PROCEDURE — 82103 ALPHA-1-ANTITRYPSIN TOTAL: CPT

## 2021-03-22 PROCEDURE — 86376 MICROSOMAL ANTIBODY EACH: CPT

## 2021-03-22 PROCEDURE — 86706 HEP B SURFACE ANTIBODY: CPT

## 2021-03-22 PROCEDURE — 83550 IRON BINDING TEST: CPT

## 2021-03-22 PROCEDURE — 83540 ASSAY OF IRON: CPT

## 2021-03-22 PROCEDURE — 82728 ASSAY OF FERRITIN: CPT

## 2021-03-22 PROCEDURE — 86255 FLUORESCENT ANTIBODY SCREEN: CPT

## 2021-03-22 PROCEDURE — 87340 HEPATITIS B SURFACE AG IA: CPT

## 2021-03-22 PROCEDURE — 86803 HEPATITIS C AB TEST: CPT

## 2021-03-22 PROCEDURE — 86038 ANTINUCLEAR ANTIBODIES: CPT

## 2021-03-22 PROCEDURE — 86704 HEP B CORE ANTIBODY TOTAL: CPT

## 2021-03-22 PROCEDURE — 80053 COMPREHEN METABOLIC PANEL: CPT

## 2021-03-22 PROCEDURE — 82390 ASSAY OF CERULOPLASMIN: CPT

## 2021-03-23 LAB
ANTI-NUCLEAR ANTIBODY (ANA): NEGATIVE
FERRITIN: 360 NG/ML
HBV SURFACE AB TITR SER: NORMAL {TITER}
HEPATITIS B SURFACE ANTIGEN INTERPRETATION: NORMAL
IRON SATURATION: 29 % (ref 15–50)
IRON: 88 MCG/DL (ref 37–145)
TOTAL IRON BINDING CAPACITY: 307 MCG/DL (ref 250–450)

## 2021-03-24 LAB
ALPHA-1 ANTITRYPSIN: 167 MG/DL (ref 90–200)
ANTI-MITOCHONDRIAL AB, IFA: NEGATIVE
CERULOPLASMIN: 35 MG/DL (ref 16–45)
HEPATITIS B CORE TOTAL ANTIBODY: NONREACTIVE
HEPATITIS C ANTIBODY INTERPRETATION: NORMAL
SMOOTH MUSCLE ANTIBODY: NEGATIVE

## 2021-03-26 ENCOUNTER — TELEPHONE (OUTPATIENT)
Dept: FAMILY MEDICINE CLINIC | Age: 61
End: 2021-03-26

## 2021-03-26 DIAGNOSIS — R06.02 SHORTNESS OF BREATH: Primary | ICD-10-CM

## 2021-03-26 DIAGNOSIS — R06.09 DYSPNEA ON EXERTION: ICD-10-CM

## 2021-03-26 LAB — LIVER-KIDNEY MICROSOME-1 AB IGG: 0.9 U (ref 0–24.9)

## 2021-03-26 NOTE — TELEPHONE ENCOUNTER
----- Message from Tri Pat, DO sent at 3/25/2021  8:12 PM EDT -----  Set up pt with pulmonology  Dr. Mallory Rosas   since Dr. Presley Luna is not seeing pt now in office   let me know

## 2021-03-29 PROBLEM — Z90.49 HISTORY OF CHOLECYSTECTOMY: Status: ACTIVE | Noted: 2021-03-29

## 2021-03-30 DIAGNOSIS — N60.01 CYST OF RIGHT BREAST: ICD-10-CM

## 2021-03-30 DIAGNOSIS — N60.02 CYST OF LEFT BREAST: ICD-10-CM

## 2021-03-30 DIAGNOSIS — N64.4 BREAST PAIN: Primary | ICD-10-CM

## 2021-04-07 ENCOUNTER — PATIENT MESSAGE (OUTPATIENT)
Dept: FAMILY MEDICINE CLINIC | Age: 61
End: 2021-04-07

## 2021-04-08 ENCOUNTER — TELEPHONE (OUTPATIENT)
Dept: BREAST CENTER | Age: 61
End: 2021-04-08

## 2021-04-08 RX ORDER — GLUCOSAMINE HCL/CHONDROITIN SU 500-400 MG
CAPSULE ORAL
Qty: 300 STRIP | Refills: 1 | Status: SHIPPED
Start: 2021-04-08 | End: 2022-06-22 | Stop reason: SDUPTHER

## 2021-04-08 NOTE — TELEPHONE ENCOUNTER
Left message with call back number in attempt to obtain updated obstetric related and cancer surveillance history. This information will be used for medical decision making and planning for the upcoming consultation in the [facility]) breast clinic on April 12, 2021 with YEFRI Mccray CNP.

## 2021-04-26 ENCOUNTER — TELEPHONE (OUTPATIENT)
Dept: BREAST CENTER | Age: 61
End: 2021-04-26

## 2021-04-26 NOTE — TELEPHONE ENCOUNTER
Patient's appointment and imaging was cancelled this morning. Left patient message with call back number in reference to rescheduling.

## 2021-05-07 NOTE — PROGRESS NOTES
Subjective:      Patient ID: Dick Sanders is a 56 y.o. female. HPI  History and Physical    Patient's Name/Date of Birth: Dick Sanders / 1960    Date: May 13, 2021     Samira Mir presents for evaluation of a Bilateral breast lesion    PCP: Rhonda Joel DO. Gynecologist:none. Aydin Driscoll is a pleasant 56 y.o. female with a complex past medical history that includes severe/class III obesity, hypertension, hyperlipidemia, fatigue, chronic pain of the knees bilaterally, diabetes type 2,  chronic kidney disease, fatty liver, and vitamin D deficiency. She presents today for consultation regarding bilateral breast masses. The bilateral breast lesions were discovered by mammogram she has not noticed a change in breast self-exam since presentation. She denies nipple discharge. No personal history of breast cancer. Her breast cancer risk factors include gender and elevated BMI. Ashkenazi Jehovah's witness Ancestry: No.    OBSTETRIC RELATED HISTORY:  Age of menarche was 13. Age of menopause was 50. Patient denies hormonal therapy. Patient is . Age of first live birth was 20. Patient did not breast feed. Is patient interested in fertility information about fertility preservation? No    CANCER SURVEILLANCE HISTORY:  Mammograms: Yes   Breast MRI's: No   Breast Biopsies: Yes  benign right breast; [facility]. Colonoscopy: Yes   GI Polyps: No   EGD: No   Pelvic Exam: No   Pap Smear: No   Dermatology: No   Lung screening: no    Estimated body mass index is 49.25 kg/m² as calculated from the following:    Height as of 3/15/21: 5' 3\" (1.6 m). Weight as of 3/15/21: 278 lb (126.1 kg). Bra Size: 36c    Because violence is so common, we ask all our patients: are you in a relationship or do you live with a person who threatens, hurts, or controls you:  denies    Patient drinks no caffeinated beverages. Patient does not smoke cigarettes. Patient does not use recreational drugs.       Past Medical History:   Diagnosis Date    History of blood transfusion 5104    at [facility] complication after kidney stone surgery    History of kidney stones     Hx of cardiovascular stress test 12/17/2019    Lexiscan stress test    Hypertension     Thyroid disease        Past Surgical History:   Procedure Laterality Date    CHOLECYSTECTOMY, LAPAROSCOPIC N/A 5/18/2020    LAPAROSCOPIC CHOLECYSTECTOMY WITH INTRAOPERATIVE CHOLANGIOGRAM performed by Kelly Pearce MD at [place] [address], VAGINAL      JOINT REPLACEMENT Right 2019    knee  [address]      incisional removal       Current Outpatient Medications   Medication Sig Dispense Refill    blood glucose monitor strips Test 3 times a day & as needed for symptoms of irregular blood glucose. Dispense sufficient amount for indicated testing frequency plus additional to accommodate PRN testing needs. ONE TOUCH ULTRA.  300 strip 1    pravastatin (PRAVACHOL) 10 MG tablet Take 1 tablet by mouth daily 30 tablet 2    insulin glargine (LANTUS SOLOSTAR) 100 UNIT/ML injection pen Inject 14 Units into the skin nightly 5 pen 3    aspirin EC 81 MG EC tablet Take 1 tablet by mouth daily 90 tablet 1    Insulin Pen Needle 31G X 5 MM MISC 1 each by Does not apply route daily 100 each 3    hydroCHLOROthiazide (HYDRODIURIL) 25 MG tablet take 1 tablet by mouth once daily 90 tablet 1    levothyroxine (SYNTHROID) 75 MCG tablet take 1 tablet by mouth once daily 90 tablet 1    citalopram (CELEXA) 20 MG tablet take 1 tablet by mouth once daily 90 tablet 1    lisinopril (PRINIVIL;ZESTRIL) 40 MG tablet take 1 tablet by mouth once daily 90 tablet 1    metoprolol succinate (TOPROL XL) 100 MG extended release tablet take 1 tablet by mouth once daily 90 tablet 1    albuterol sulfate  (90 Base) MCG/ACT inhaler Inhale 2 puffs into the lungs every 6 hours as needed for Wheezing or Shortness of Breath 1 Inhaler 3  hydrocortisone 2.5 % cream Apply topically 2 times daily. (Patient not taking: Reported on 2/19/2021) 1 Tube 0    ibuprofen (IBU) 800 MG tablet Take 1 tablet by mouth every 8 hours as needed for Pain 270 tablet 0     No current facility-administered medications for this visit. No Known Allergies    Family History   Problem Relation Age of Onset    Cancer Mother         lung    COPD Father        Social History     Socioeconomic History    Marital status:      Spouse name: Not on file    Number of children: Not on file    Years of education: Not on file    Highest education level: Not on file   Occupational History    Not on file   Social Needs    Financial resource strain: Not hard at all   [employer] insecurity     Worry: Never true     Inability: Never true   [employer] needs     Medical: No     Non-medical: No   Tobacco Use    Smoking status: Never Smoker    Smokeless tobacco: Never Used   Substance and Sexual Activity    Alcohol use: Not Currently    Drug use: No    Sexual activity: Not on file   Lifestyle    Physical activity     Days per week: Not on file     Minutes per session: Not on file    Stress: Not on file   Relationships    Social connections     Talks on phone: Not on file     Gets together: Not on file     Attends [employer] service: Not on file     Active member of club or organization: Not on file     Attends meetings of clubs or organizations: Not on file     Relationship status: Not on file    Intimate partner violence     Fear of current or ex partner: Not on file     Emotionally abused: Not on file     Physically abused: Not on file     Forced sexual activity: Not on file   Other Topics Concern    Not on file   Social History Narrative    Not on file       Occupation: retired      Review of Systems   Constitutional: Positive for fatigue. Negative for activity change, appetite change, chills, fever and unexpected weight change.         Sharla has multiple medical issues on this visit. HENT: Negative for congestion, postnasal drip, rhinorrhea, sinus pressure, sinus pain, sore throat, trouble swallowing and voice change. Eyes: Negative for discharge, itching and visual disturbance. Respiratory: Positive for shortness of breath (on exertion). Negative for cough, choking, chest tightness and wheezing. Cardiovascular: Negative for chest pain, palpitations and leg swelling. Gastrointestinal: Negative for abdominal distention, abdominal pain, blood in stool, constipation, diarrhea, nausea and vomiting. Endocrine: Negative for cold intolerance and heat intolerance. Genitourinary: Negative for difficulty urinating, dysuria, frequency and hematuria. Musculoskeletal: Positive for myalgias. Negative for arthralgias, back pain, gait problem, joint swelling, neck pain and neck stiffness. Allergic/Immunologic: Negative for environmental allergies and food allergies. Neurological: Negative for dizziness, seizures, syncope, speech difficulty, weakness, light-headedness and headaches. Hematological: Negative for adenopathy. Does not bruise/bleed easily. Psychiatric/Behavioral: Negative for agitation, confusion and decreased concentration. The patient is not nervous/anxious. Patient denies previous history of DVT/PE. Objective:   Physical Exam  Vitals and nursing note reviewed. Constitutional:       General: She is not in acute distress. Appearance: She is well-developed. She is obese. She is not diaphoretic. Comments: ECOG 2 due to underlying comorbidities. HENT:      Head: Normocephalic and atraumatic. Mouth/Throat:      Pharynx: No oropharyngeal exudate. Eyes:      General: No scleral icterus. Right eye: No discharge. Left eye: No discharge. Conjunctiva/sclera: Conjunctivae normal.   Neck:      Thyroid: No thyromegaly. Vascular: No JVD. Trachea: No tracheal deviation.    Cardiovascular: Rate and Rhythm: Normal rate and regular rhythm. Heart sounds: No murmur heard. No friction rub. No gallop. Pulmonary:      Effort: Pulmonary effort is normal. No respiratory distress or retractions. Breath sounds: Normal breath sounds. No stridor. No wheezing or rales. Chest:      Chest wall: No mass, lacerations, deformity, swelling, tenderness or edema. Breasts: Breasts are symmetrical.         Right: No inverted nipple, mass, nipple discharge, skin change or tenderness. Left: No inverted nipple, mass, nipple discharge, skin change or tenderness. Comments: She has multiple, mobile cystic type densities bilaterally; no skin changes. No nipple discharge, no axillary adenopathy. Abdominal:      General: There is no distension. Palpations: Abdomen is soft. Tenderness: There is no abdominal tenderness. There is no guarding or rebound. Musculoskeletal:         General: No tenderness or deformity. Normal range of motion. Right shoulder: Normal.      Left shoulder: Normal.      Cervical back: Normal range of motion and neck supple. Right lower leg: Edema present. Left lower leg: Edema present. Lymphadenopathy:      Cervical: No cervical adenopathy. Right cervical: No superficial, deep or posterior cervical adenopathy. Left cervical: No superficial, deep or posterior cervical adenopathy. Upper Body:      Right upper body: No pectoral adenopathy. Left upper body: No pectoral adenopathy. Skin:     General: Skin is warm and dry. Coloration: Skin is not pale. Findings: No erythema or rash. Neurological:      Mental Status: She is alert and oriented to person, place, and time. Coordination: Coordination normal.   Psychiatric:         Behavior: Behavior normal.         Thought Content:  Thought content normal.         Judgment: Judgment normal.        Assessment:      56 y.o. pleasant female who's risk for breast cancer include gender and obesity. She presents without breast related complaints after a screening mammogram at [facility]. 2/23/21 - Bilateral screening mammogram - [facility]:      Impression   1. Bilateral heterogeneously dense breasts. 2. Bilateral well-circumscribed masses, most likely cysts with the largest in   the left breast at the 12 o'clock position measuring 3.7 cm and in the right   upper outer quadrant measuring 1.6 cm.  Further evaluation with bilateral   breast ultrasound is suggested. BIRADS:   BIRADS - CATEGORY 0       3/4/21 - Bilateral breast ultrasound - [facility]:  FINDINGS:   Right breast: There is a simple anechoic cyst at the 9 o'clock position   measuring 2.2 x 1.9 x 0.9 cm.  This probably corresponds to the largest   lesions seen on the mammogram.  Multiple other smaller cysts are seen.  No   suspicious mass is seen.       2 small normal appearing lymph nodes are seen in the right axilla.       Left breast: There are multiple cysts including mildly complex cyst at the 12   o'clock position 3 cm from the nipple measuring 3.5 x 3.6 x 2.8 cm.  This   appears to contain a septation although it may represent 2 separate adjacent   cysts.  This probably corresponds to the largest nodule seen on the   mammogram.  There is also a cyst at the 5 o'clock position measuring 4.6 by   2.6 x 5 cm.  This was not seen clearly on the mammogram.  No suspicious mass   is seen.  The other scattered smaller cysts are seen.       It should be noted that ultrasound cannot completely exclude a solid mass.           Impression   1.  Multiple bilateral cysts including a 2.2 x 1.9 x 0.9 cm cyst at the 9   o'clock position in the right breast and 3.5 x 2.8 x 3.6 cm cyst at the 12   o'clock position of the left breast.  These probably correspond to the   largest nodular densities seen on the recent mammogram.   2. 4.6 x 2.6 x 5 cm cyst seen at the 5 o'clock position in the left breast.   This was not seen with certainty on the mammogram.   3. Unless otherwise clinically indicated, follow-up bilateral mammography and   ultrasound in 6 months is suggested.       BIRADS:   BIRADS - CATEGORY 3       Probably Benign Findings. A short interval follow-up is recommended in 6   months.       OVERALL ASSESSMENT - PROBABLY BENIGN.       06/17/2021: B/L DIAGNOSTIC MAMMOGRAM AND B/L breast US @ [address] [place] [address] Dr Paulino    FINDINGS:   Bilateral diagnostic mammogram: Breast tissue is heterogeneously dense which   may obscure small masses.  Scattered bilateral well-circumscribed, oval   masses again noted.  No suspicious mass, microcalcifications, or   architectural distortion identified in either breast.       Bilateral diagnostic ultrasound: Targeted bilateral breast ultrasound was   performed utilizing high-resolution, real-time scanning.  Again noted are   multiple hypoechoic masses of varying size with well-circumscribed margins   scattered throughout both breasts. [place] are most suggestive of complicated   cysts.  No suspicious cystic or solid mass identified in either breast.           Impression   Bilateral oval masses are probably benign.       Recommendation:       Six-month follow-up bilateral diagnostic ultrasound is advised.       BIRADS:   BIRADS - CATEGORY 3       Probably Benign Findings. A short interval follow-up is recommended in 6   months.       OVERALL ASSESSMENT - PROBABLY BENIGN. During pt's visit today, a Tyrer-Cuzick Risk Evaluation was performed. Pt has a 7 % lifetime risk (to age 80) of developing breast cancer. Clinically, she has bilateral, firm, mobile nodular densities most consistent with cystic densities. There is no associated skin changes and no axillary lymphadenopathy. We reviewed her imaging today, including her BI-RADS result. We discussed that a BI-RADS 3 category finding on mammography is a particularly challenging category that can cause confusion and anxiety for patients.  This assessment means that the lesion is one of a few specific findings that has been shown to have less than a 2 percent chance of being cancer and that any cancer present is not likely to spread in the time frame recommended for follow-up imaging. We discussed that I cannot guarantee that she does not have breast cancer now; nor can I guarantee that she won't develop breast cancer in the future. However, there were no concerning findings identified on this visit. We reviewed healthy lifestyle and she was strongly counseled on weight management; offered referral to weight loss physician to improve her overall health which she declined. We also discussed avoiding alcohol, and BSE in detail. At this time, we will plan to have her return in 6 months with repeat B/L diagnostic US; sooner for new or changing findings. Plan:   1. Continue monthly breast/chest wall self examination; detailed instructions reviewed today. Bring any changes to your physician's attention. 2. Encouraged low calorie diet to assist in weight loss. 3. Avoid alcohol. 4. Limit caffeine intake. 5. Wear a good support bra at all times. 6. Repeat lateral diagnostic ultrasounds in 6 months  7. Continue follow up with Primary Care/Gynecology. 8. RTC 6 months with imaging prior. During today's visit, face-to-face time 50 minutes, greater than 50% in counseling education and coordination of care. All questions were answered to her apparent satisfaction, and she is agreeable to the plan as outlined above. Samantha Hodge, RN, MSN, APRN-CNP, [address]  Advanced Oncology Certified Nurse Practitioner  Department of Breast Surgery  [facility]/  [facility] in collaboration with Dr. Giorgio Martínez.  Cheryl/Marguerite Choe

## 2021-05-13 NOTE — PROGRESS NOTES
Diabetes Self-Management Education Record    Participant Name: Karen Myers  Referring Provider: Boaz Parnell DO  Assessment/Evaluation Ratings:  1=Needs Instruction   4=Demonstrates Understanding/Competency  2=Needs Review   NC=Not Covered    3=Comprehends Key Points  N/A=Not Applicable  Topics/Learning Objectives Pre-session Assess Date:  Instructor initials/date  2/23/21 [facility]   Instr. Date    Instructor initials/date  2/23/21 [facility] Follow-up Post- session Eval Comments   Diabetes disease process & Treatment process:   -Define type of diabetes in simple terms.  - Describe the ABCs of  diabetes management  -Identify own type of diabetes  -Identify lifestyle changes/treatment options  -other:  1 [x] All     []  []  []  []  []  3 2/23/21 N[facility]: Newly dx type 2 diabetic w/ significant family hx. Developing strategies for Healthy coping/psychosocial issues:    -Describe feelings about living with diabetes  -Identify coping strategies and sources of stress  -Identify support needed & support network available  -Complete PAID-5 Diabetes questionnaire 1 [x] All     []  []  []    []  3 2/23/21 NERY: PAID 5=3            Prevention, detection & treatment of Chronic complications:    -Identify the prevention, detection and treatment for complications including immunizations, preventive eye, foot, dental and renal exams as indicated per the participant's duration of diabetes and health status.  -Define the natural course of diabetes and the relationship of blood glucose levels to long term complications of diabetes.   1 [x] All     []            []  3 2/23/21 [facility]   Prevention, detection & treatment of acute complications:    -State the causes,signs & symptoms of hyper & hypoglycemia, and prevention & treatment strategies.   -Describe sick day guidelines  DKA /indications for ketone testing &  when to call physician  1 [x] All     []      []    3   2/23/21 [facility]           -Identify severe weather/situation crisis  & diabetes supplies management  []      Using medications safely:   -State effects of diabetes medicines on blood glucose levels;  -List diabetes medication taken, action & side effects n/a [] All     []  []   2/23/21 [address]   Insulin/Injectables/glucagon  -Name appropriate injection sites; proper storage; supplies needed;  2   [x]  3 2/23/21 [facility]: Lantus Pen    Demonstrate proper technique  [x]      Monitoring blood glucose, interpreting and using results:   -Identify the purpose of testing   -Identify recommended & personal blood glucose targets & HgbA1C target levels  -State the Importance of logging blood glucose levels for pattern recognition;   -State benefits of reading/using pt generated health data  -Verbalize safe lancet disposal 1 [x] All     []  []    []  []  []  3 2/23/21 [facility]: Pt is testing 1x daily before breakfast. Ranging 350s-400s. Reviewed guidelines and provided log for pt to document on. A1C 13%   -Demonstrate proper testing technique  []      Incorporating physical activity into lifestyle:   -State effect of exercise on blood glucose levels;   -State benefits of regular exercise;   -Define safety considerations/food choices if needed.  -Describe contraindications/maintenance of activity. 1 [x] All     []  []    []  []  3 2/23/21 N[facility]: Patient is not active. Reviewed guidelines   Incorporating nutritional management into lifestyle:   -Describe effect of type, amount & timing of food on blood glucose  -Describe methods for preparing and planning healthy meals  -Correctly read food labels  -Name 3 foods high in Carbohydrate 1 [x] All       []    []    []  []  3/4 2/23/21 [address]: Instructed on 1500 calories ADA, 11 total CHO choices; 2 cho at breakfast, 3 cho at lunch and dinner, 1 cho choice at AM/PM/HS. Pt states she was consuming excessive carbohydrates prior to dx. States drinking soda daily and large portions. Since dx, pt has discontinued sugary beverage intakes and has switched to sugar-free alternatives and water. Reviewed label reading with pt and pt was able to convert label total carbohydrates into carbohydrate servings to coincide with meal plan. Discussed types of carbohydrates, meal timing, and consistency of carbohydrates. Reviewed weight goals, fiber intakes, low fat, and lean proteins. Encouraged pt to measure portions and call with any questions. Patient was very pleased at the end of session and stated she wants to follow meal plan. -Plan a carbohydrate-controlled meal based on individualized meal plan  -Demonstrate CHO counting/portion control   []  []      Developing strategies for problem solving to promote health/change behavior. -Identify 7 self-care behaviors; Personal health risk factors; Benefits, challenges & strategies for behavioral change and set an individualized goal selection. 1       [x]  3 2/23/21 [facility]   [x]Nutrition-Carb Couting  []Monitoring  []Exercise  []Medication  []Other:      Identified Barriers to learning/adherence to self management plan:    None  []  other    Instruction Method:  Lecture/Discussion and Handouts    Supporting Education Materials/Equipment Provided: Self-management manual, Meal Plan and Nutritional Packet   [][place] citizen materials       [] services     []Other:      Encounter Type Date Attended Start Time End Time Comments No Show Dates   Assessment          Session 1         Session 2        1:1 DSMES  2/23/21 [facility] 1100 1200 In person    In person Follow-up         Gestational Diabetes         Individual MNT        Meter Instrx        Insulin Instrx           Additional Comments: [x] Pt seen individually due to Covid-19 Safety precautions and no group session available.         Date:  5/13/21 Follow-up goal attainment based on patients initial DSMES goal  met  Dr Notified by [x] EMR []Fax        []Post class Hgb A1C  []Medication compliance   [x]Plate method/meal plan compliance   []Able to state the number of Carbohydrate servings eaten at B,L,D   []Testing blood glucose as prescribed by PCP   []Exercise Routine   []Other:   []Other:     []Patient lost to follow-up  Dr Notified by []EMR []Fax     Personal Support Plan:      [x] Keep all scheduled doctor appointments   [] Make and keep appointments with specialists (foot, eye, dentist) as recommended   [] Consult my pharmacist about all new medications or to ask any medication questions   [] Get tested for sleep apnea   [] Seek help for:   [] Make an appointment with:   [] Attend smoking cessation classes or call 1-800-QUIT-NOW  [] Attend Diabetes Support Group   [] Use diabetes magazines, books, or credible web-sites like the ADA for more information  [] Increase exercise at home or join an exercise program:   [] Other:

## 2021-05-14 ENCOUNTER — OFFICE VISIT (OUTPATIENT)
Dept: FAMILY MEDICINE CLINIC | Age: 61
End: 2021-05-14
Payer: MEDICAID

## 2021-05-14 ENCOUNTER — CLINICAL DOCUMENTATION (OUTPATIENT)
Dept: GENERAL RADIOLOGY | Age: 61
End: 2021-05-14

## 2021-05-14 VITALS
DIASTOLIC BLOOD PRESSURE: 84 MMHG | SYSTOLIC BLOOD PRESSURE: 124 MMHG | RESPIRATION RATE: 16 BRPM | WEIGHT: 282 LBS | HEIGHT: 63 IN | HEART RATE: 86 BPM | OXYGEN SATURATION: 96 % | BODY MASS INDEX: 49.96 KG/M2 | TEMPERATURE: 97.8 F

## 2021-05-14 DIAGNOSIS — I10 ESSENTIAL HYPERTENSION: ICD-10-CM

## 2021-05-14 DIAGNOSIS — Z79.4 TYPE 2 DIABETES MELLITUS WITHOUT COMPLICATION, WITH LONG-TERM CURRENT USE OF INSULIN (HCC): Primary | ICD-10-CM

## 2021-05-14 DIAGNOSIS — R10.9 FLANK PAIN: ICD-10-CM

## 2021-05-14 DIAGNOSIS — E11.9 TYPE 2 DIABETES MELLITUS WITHOUT COMPLICATION, WITH LONG-TERM CURRENT USE OF INSULIN (HCC): Primary | ICD-10-CM

## 2021-05-14 DIAGNOSIS — E03.9 ACQUIRED HYPOTHYROIDISM: ICD-10-CM

## 2021-05-14 DIAGNOSIS — N18.2 STAGE 2 CHRONIC KIDNEY DISEASE: ICD-10-CM

## 2021-05-14 LAB
BILIRUBIN, POC: NEGATIVE
BLOOD URINE, POC: NORMAL
CHP ED QC CHECK: NORMAL
CLARITY, POC: CLEAR
COLOR, POC: NORMAL
GLUCOSE BLD-MCNC: 201 MG/DL
GLUCOSE URINE, POC: NEGATIVE
HBA1C MFR BLD: 8 %
KETONES, POC: NEGATIVE
LEUKOCYTE EST, POC: NEGATIVE
NITRITE, POC: NEGATIVE
PH, POC: 5.5
PROTEIN, POC: NEGATIVE
SPECIFIC GRAVITY, POC: 1.02
UROBILINOGEN, POC: 0.2

## 2021-05-14 PROCEDURE — 1036F TOBACCO NON-USER: CPT | Performed by: FAMILY MEDICINE

## 2021-05-14 PROCEDURE — 3017F COLORECTAL CA SCREEN DOC REV: CPT | Performed by: FAMILY MEDICINE

## 2021-05-14 PROCEDURE — 99214 OFFICE O/P EST MOD 30 MIN: CPT | Performed by: FAMILY MEDICINE

## 2021-05-14 PROCEDURE — G8417 CALC BMI ABV UP PARAM F/U: HCPCS | Performed by: FAMILY MEDICINE

## 2021-05-14 PROCEDURE — 81002 URINALYSIS NONAUTO W/O SCOPE: CPT | Performed by: FAMILY MEDICINE

## 2021-05-14 PROCEDURE — 83036 HEMOGLOBIN GLYCOSYLATED A1C: CPT | Performed by: FAMILY MEDICINE

## 2021-05-14 PROCEDURE — 2022F DILAT RTA XM EVC RTNOPTHY: CPT | Performed by: FAMILY MEDICINE

## 2021-05-14 PROCEDURE — 82962 GLUCOSE BLOOD TEST: CPT | Performed by: FAMILY MEDICINE

## 2021-05-14 PROCEDURE — 3052F HG A1C>EQUAL 8.0%<EQUAL 9.0%: CPT | Performed by: FAMILY MEDICINE

## 2021-05-14 PROCEDURE — G8427 DOCREV CUR MEDS BY ELIG CLIN: HCPCS | Performed by: FAMILY MEDICINE

## 2021-05-14 NOTE — PROGRESS NOTES
Sharla did not show for appointment on 5/13/21  at [facility] for breast cyst aspiration.   Electronically signed by Sandra Cadet RN, BSN on 5/14/2021 at 8:23 AM

## 2021-05-20 PROBLEM — K76.0 FATTY LIVER: Status: ACTIVE | Noted: 2021-05-20

## 2021-05-20 ASSESSMENT — ENCOUNTER SYMPTOMS
FACIAL SWELLING: 0
CHOKING: 0
BLOOD IN STOOL: 1
EYE PAIN: 0
BACK PAIN: 1
COLOR CHANGE: 0
CONSTIPATION: 0
PHOTOPHOBIA: 0
ABDOMINAL DISTENTION: 0
VOICE CHANGE: 0
STRIDOR: 0
CHEST TIGHTNESS: 0
VOMITING: 0
EYES NEGATIVE: 1
SINUS PRESSURE: 0
ALLERGIC/IMMUNOLOGIC NEGATIVE: 1
ANAL BLEEDING: 0
ABDOMINAL PAIN: 0
NAUSEA: 0
DIARRHEA: 0
RECTAL PAIN: 0
EYE DISCHARGE: 0
SORE THROAT: 0
RHINORRHEA: 0
EYE REDNESS: 0
COUGH: 0
TROUBLE SWALLOWING: 0
EYE ITCHING: 0
WHEEZING: 0
SINUS PAIN: 0

## 2021-05-21 NOTE — PROGRESS NOTES
Bhavin Boy is a 56 y.o. female. HPI/Chief C/O:  Chief Complaint   Patient presents with    Diabetes     6 week check up    Back Pain     No Known Allergies    This 56year old female presents for physical exam. Pt has IDDM, hypertension, hyperlipidemia, hypothyroid, CKD stage 2, and fatigue. Pt c/o back pain for 2 days. Pt denies injury. Pt denies bladder and bowel incontinence and denies symptoms of cauda equina. Pt states she had a negative colonoscopy. ROS:  Review of Systems   Constitutional: Positive for fatigue. Negative for activity change, appetite change, chills, diaphoresis, fever and unexpected weight change. HENT: Negative. Negative for congestion, dental problem, drooling, ear discharge, ear pain, facial swelling, hearing loss, mouth sores, nosebleeds, postnasal drip, rhinorrhea, sinus pressure, sinus pain, sneezing, sore throat, tinnitus, trouble swallowing and voice change. Eyes: Negative. Negative for photophobia, pain, discharge, redness, itching and visual disturbance. Respiratory: Negative for cough, choking, chest tightness, shortness of breath, wheezing and stridor. Cardiovascular: Negative. Negative for chest pain, palpitations and leg swelling. Gastrointestinal: Positive for blood in stool. Negative for abdominal distention, abdominal pain, anal bleeding, constipation, diarrhea, nausea, rectal pain and vomiting. Endocrine: Negative for cold intolerance, heat intolerance, polydipsia, polyphagia and polyuria. Genitourinary: Positive for frequency and urgency. Negative for decreased urine volume, difficulty urinating, dysuria, flank pain, genital sores, hematuria, menstrual problem and pelvic pain. Musculoskeletal: Positive for arthralgias, back pain and myalgias. Negative for gait problem, joint swelling, neck pain and neck stiffness. Skin: Negative. Negative for color change, pallor, rash and wound. Allergic/Immunologic: Negative.     Neurological: not ill-appearing, toxic-appearing or diaphoretic. Comments: Patient has morbid obesity. Patient instructed on low calorie, healthy ADA diet. HENT:      Head: Normocephalic and atraumatic. Nose: Nose normal. No congestion or rhinorrhea. Eyes:      General: No scleral icterus. Right eye: No discharge. Left eye: No discharge. Conjunctiva/sclera: Conjunctivae normal.      Pupils: Pupils are equal, round, and reactive to light. Neck:      Thyroid: No thyromegaly. Vascular: No carotid bruit or JVD. Trachea: No tracheal deviation. Cardiovascular:      Rate and Rhythm: Normal rate and regular rhythm. Pulses: Normal pulses. Heart sounds: Normal heart sounds. No murmur heard. No friction rub. No gallop. Pulmonary:      Effort: Pulmonary effort is normal. No respiratory distress. Breath sounds: Normal breath sounds. No stridor. No wheezing, rhonchi or rales. Chest:      Chest wall: No tenderness. Abdominal:      General: Bowel sounds are normal. There is no distension. Palpations: Abdomen is soft. There is no mass. Tenderness: There is no abdominal tenderness. There is no right CVA tenderness, left CVA tenderness, guarding or rebound. Hernia: No hernia is present. Musculoskeletal:         General: Tenderness present. No swelling, deformity or signs of injury. Cervical back: Normal range of motion and neck supple. No rigidity. No muscular tenderness. Right lower leg: No edema. Left lower leg: No edema. Comments: Pain and decreased ROM multiple joints. Lymphadenopathy:      Cervical: No cervical adenopathy. Skin:     General: Skin is warm. Coloration: Skin is not jaundiced or pale. Findings: No bruising, erythema, lesion or rash. Neurological:      General: No focal deficit present. Mental Status: She is alert and oriented to person, place, and time.       Cranial Nerves: No cranial nerve deficit. Sensory: No sensory deficit. Motor: No weakness or abnormal muscle tone. Coordination: Coordination normal.      Gait: Gait normal.      Deep Tendon Reflexes: Reflexes are normal and symmetric. Reflexes normal.   Psychiatric:         Mood and Affect: Mood normal.         Behavior: Behavior normal.         Thought Content: Thought content normal.         Judgment: Judgment normal.         ASSESSMENT/PLAN  Sharla was seen today for diabetes and back pain. Diagnoses and all orders for this visit:    Type 2 diabetes mellitus without complication, with long-term current use of insulin (HCC)  -     POCT glycosylated hemoglobin (Hb A1C)  -     POCT Glucose  Stable. Lantus, ace, statin, aspirin. Essential hypertension  Controlled. HCTZ, ace, BB, statin, aspirin. Acquired hypothyroidism  Controlled. Synthroid. Stage 2 chronic kidney disease  Stable. Flank pain  -     US RETROPERITONEAL COMPLETE; Future  -     POCT Urinalysis no Micro  Not controlled. Pt instructed if any worse go ED ASAP. Outpatient Encounter Medications as of 5/14/2021   Medication Sig Dispense Refill    blood glucose monitor strips Test 3 times a day & as needed for symptoms of irregular blood glucose. Dispense sufficient amount for indicated testing frequency plus additional to accommodate PRN testing needs. ONE TOUCH ULTRA.  300 strip 1    pravastatin (PRAVACHOL) 10 MG tablet Take 1 tablet by mouth daily 30 tablet 2    insulin glargine (LANTUS SOLOSTAR) 100 UNIT/ML injection pen Inject 14 Units into the skin nightly 5 pen 3    aspirin EC 81 MG EC tablet Take 1 tablet by mouth daily 90 tablet 1    Insulin Pen Needle 31G X 5 MM MISC 1 each by Does not apply route daily 100 each 3    hydroCHLOROthiazide (HYDRODIURIL) 25 MG tablet take 1 tablet by mouth once daily 90 tablet 1    levothyroxine (SYNTHROID) 75 MCG tablet take 1 tablet by mouth once daily 90 tablet 1    citalopram (CELEXA) 20 MG tablet take 1 tablet by mouth once daily 90 tablet 1    lisinopril (PRINIVIL;ZESTRIL) 40 MG tablet take 1 tablet by mouth once daily 90 tablet 1    metoprolol succinate (TOPROL XL) 100 MG extended release tablet take 1 tablet by mouth once daily 90 tablet 1    albuterol sulfate  (90 Base) MCG/ACT inhaler Inhale 2 puffs into the lungs every 6 hours as needed for Wheezing or Shortness of Breath 1 Inhaler 3    hydrocortisone 2.5 % cream Apply topically 2 times daily. (Patient not taking: Reported on 2/19/2021) 1 Tube 0    ibuprofen (IBU) 800 MG tablet Take 1 tablet by mouth every 8 hours as needed for Pain 270 tablet 0     No facility-administered encounter medications on file as of 5/14/2021. Return in about 2 weeks (around 5/28/2021).         Reviewed recent labs related to Sharla's current problems      Discussed importance of regular Health Maintenance follow up  Health Maintenance   Topic    Pneumococcal 0-64 years Vaccine (1 of 4 - PCV13)    Diabetic foot exam     COVID-19 Vaccine (1)    HIV screen     Shingles Vaccine (1 of 2)    Diabetic retinal exam     Flu vaccine (Season Ended)    Lipid screen     TSH testing     Potassium monitoring     Creatinine monitoring     A1C test (Diabetic or Prediabetic)     Breast cancer screen     DTaP/Tdap/Td vaccine (3 - Td)    Colon cancer screen colonoscopy     Hepatitis C screen     Hepatitis A vaccine     Hib vaccine     Meningococcal (ACWY) vaccine

## 2021-05-24 PROBLEM — R10.9 FLANK PAIN: Status: ACTIVE | Noted: 2021-05-24

## 2021-05-24 ASSESSMENT — ENCOUNTER SYMPTOMS: SHORTNESS OF BREATH: 0

## 2021-05-25 ENCOUNTER — HOSPITAL ENCOUNTER (OUTPATIENT)
Dept: ULTRASOUND IMAGING | Age: 61
Discharge: HOME | End: 2021-05-25
Payer: MEDICAID

## 2021-05-25 DIAGNOSIS — R10.9 FLANK PAIN: ICD-10-CM

## 2021-05-25 PROCEDURE — 76770 US EXAM ABDO BACK WALL COMP: CPT

## 2021-06-10 ENCOUNTER — HOSPITAL ENCOUNTER (EMERGENCY)
Age: 61
Discharge: HOME | End: 2021-06-11
Attending: EMERGENCY MEDICINE
Payer: MEDICAID

## 2021-06-10 ENCOUNTER — APPOINTMENT (OUTPATIENT)
Dept: CT IMAGING | Age: 61
End: 2021-06-10
Payer: MEDICAID

## 2021-06-10 DIAGNOSIS — N13.30 HYDRONEPHROSIS, UNSPECIFIED HYDRONEPHROSIS TYPE: ICD-10-CM

## 2021-06-10 DIAGNOSIS — N20.0 KIDNEY STONE: ICD-10-CM

## 2021-06-10 DIAGNOSIS — N17.9 AKI (ACUTE KIDNEY INJURY) (HCC): Primary | ICD-10-CM

## 2021-06-10 LAB
ALBUMIN SERPL-MCNC: 4.1 G/DL (ref 3.5–5.2)
ALP BLD-CCNC: 135 U/L (ref 35–104)
ALT SERPL-CCNC: 51 U/L (ref 0–32)
ANION GAP SERPL CALCULATED.3IONS-SCNC: 12 MMOL/L (ref 7–16)
AST SERPL-CCNC: 41 U/L (ref 0–31)
BACTERIA: ABNORMAL /HPF
BASOPHILS ABSOLUTE: 0.05 E9/L (ref 0–0.2)
BASOPHILS RELATIVE PERCENT: 0.4 % (ref 0–2)
BILIRUB SERPL-MCNC: 0.3 MG/DL (ref 0–1.2)
BILIRUBIN URINE: NEGATIVE
BLOOD, URINE: ABNORMAL
BUN BLDV-MCNC: 32 MG/DL (ref 6–23)
CALCIUM SERPL-MCNC: 9.7 MG/DL (ref 8.6–10.2)
CHLORIDE BLD-SCNC: 102 MMOL/L (ref 98–107)
CLARITY: CLEAR
CO2: 23 MMOL/L (ref 22–29)
COLOR: YELLOW
CREAT SERPL-MCNC: 1.7 MG/DL (ref 0.5–1)
CRYSTALS, UA: ABNORMAL /HPF
EOSINOPHILS ABSOLUTE: 0.21 E9/L (ref 0.05–0.5)
EOSINOPHILS RELATIVE PERCENT: 1.7 % (ref 0–6)
GFR AFRICAN AMERICAN: 37
GFR NON-AFRICAN AMERICAN: 30 ML/MIN/1.73
GLUCOSE BLD-MCNC: 255 MG/DL (ref 74–99)
GLUCOSE URINE: NEGATIVE MG/DL
HCT VFR BLD CALC: 39.8 % (ref 34–48)
HEMOGLOBIN: 12.9 G/DL (ref 11.5–15.5)
IMMATURE GRANULOCYTES #: 0.11 E9/L
IMMATURE GRANULOCYTES %: 0.9 % (ref 0–5)
KETONES, URINE: NEGATIVE MG/DL
LEUKOCYTE ESTERASE, URINE: NEGATIVE
LYMPHOCYTES ABSOLUTE: 2.73 E9/L (ref 1.5–4)
LYMPHOCYTES RELATIVE PERCENT: 21.5 % (ref 20–42)
MCH RBC QN AUTO: 29.3 PG (ref 26–35)
MCHC RBC AUTO-ENTMCNC: 32.4 % (ref 32–34.5)
MCV RBC AUTO: 90.2 FL (ref 80–99.9)
MONOCYTES ABSOLUTE: 1.06 E9/L (ref 0.1–0.95)
MONOCYTES RELATIVE PERCENT: 8.4 % (ref 2–12)
NEUTROPHILS ABSOLUTE: 8.51 E9/L (ref 1.8–7.3)
NEUTROPHILS RELATIVE PERCENT: 67.1 % (ref 43–80)
NITRITE, URINE: NEGATIVE
PDW BLD-RTO: 14.6 FL (ref 11.5–15)
PH UA: 5.5 (ref 5–9)
PLATELET # BLD: 189 E9/L (ref 130–450)
PMV BLD AUTO: 10.9 FL (ref 7–12)
POTASSIUM REFLEX MAGNESIUM: 4.3 MMOL/L (ref 3.5–5)
PROTEIN UA: NEGATIVE MG/DL
RBC # BLD: 4.41 E12/L (ref 3.5–5.5)
RBC UA: ABNORMAL /HPF (ref 0–2)
SODIUM BLD-SCNC: 137 MMOL/L (ref 132–146)
SPECIFIC GRAVITY UA: 1.02 (ref 1–1.03)
TOTAL PROTEIN: 7.9 G/DL (ref 6.4–8.3)
UROBILINOGEN, URINE: 0.2 E.U./DL
WBC # BLD: 12.7 E9/L (ref 4.5–11.5)
WBC UA: ABNORMAL /HPF (ref 0–5)

## 2021-06-10 PROCEDURE — 6360000002 HC RX W HCPCS: Performed by: EMERGENCY MEDICINE

## 2021-06-10 PROCEDURE — 85025 COMPLETE CBC W/AUTO DIFF WBC: CPT

## 2021-06-10 PROCEDURE — 81001 URINALYSIS AUTO W/SCOPE: CPT

## 2021-06-10 PROCEDURE — 80053 COMPREHEN METABOLIC PANEL: CPT

## 2021-06-10 PROCEDURE — 96374 THER/PROPH/DIAG INJ IV PUSH: CPT

## 2021-06-10 PROCEDURE — 2580000003 HC RX 258: Performed by: EMERGENCY MEDICINE

## 2021-06-10 PROCEDURE — 99284 EMERGENCY DEPT VISIT MOD MDM: CPT

## 2021-06-10 PROCEDURE — 74176 CT ABD & PELVIS W/O CONTRAST: CPT

## 2021-06-10 RX ORDER — 0.9 % SODIUM CHLORIDE 0.9 %
1000 INTRAVENOUS SOLUTION INTRAVENOUS ONCE
Status: COMPLETED | OUTPATIENT
Start: 2021-06-10 | End: 2021-06-11

## 2021-06-10 RX ORDER — FENTANYL CITRATE 50 UG/ML
50 INJECTION, SOLUTION INTRAMUSCULAR; INTRAVENOUS ONCE
Status: COMPLETED | OUTPATIENT
Start: 2021-06-10 | End: 2021-06-10

## 2021-06-10 RX ADMIN — SODIUM CHLORIDE 1000 ML: 9 INJECTION, SOLUTION INTRAVENOUS at 23:11

## 2021-06-10 RX ADMIN — FENTANYL CITRATE 50 MCG: 50 INJECTION, SOLUTION INTRAMUSCULAR; INTRAVENOUS at 23:14

## 2021-06-10 ASSESSMENT — PAIN DESCRIPTION - DESCRIPTORS: DESCRIPTORS: SHARP

## 2021-06-10 ASSESSMENT — ENCOUNTER SYMPTOMS
COUGH: 0
ABDOMINAL PAIN: 1
SHORTNESS OF BREATH: 0
BACK PAIN: 0

## 2021-06-10 ASSESSMENT — PAIN DESCRIPTION - ORIENTATION
ORIENTATION: LEFT
ORIENTATION: RIGHT;LEFT

## 2021-06-10 ASSESSMENT — PAIN DESCRIPTION - FREQUENCY: FREQUENCY: CONTINUOUS

## 2021-06-10 ASSESSMENT — PAIN SCALES - GENERAL
PAINLEVEL_OUTOF10: 7
PAINLEVEL_OUTOF10: 4
PAINLEVEL_OUTOF10: 7

## 2021-06-10 ASSESSMENT — PAIN DESCRIPTION - LOCATION
LOCATION: FLANK
LOCATION: FLANK

## 2021-06-10 ASSESSMENT — PAIN DESCRIPTION - PAIN TYPE: TYPE: ACUTE PAIN

## 2021-06-11 ENCOUNTER — TELEPHONE (OUTPATIENT)
Dept: FAMILY MEDICINE CLINIC | Age: 61
End: 2021-06-11

## 2021-06-11 VITALS
WEIGHT: 282 LBS | BODY MASS INDEX: 49.96 KG/M2 | HEIGHT: 63 IN | DIASTOLIC BLOOD PRESSURE: 102 MMHG | RESPIRATION RATE: 18 BRPM | SYSTOLIC BLOOD PRESSURE: 170 MMHG | TEMPERATURE: 97.2 F | HEART RATE: 83 BPM | OXYGEN SATURATION: 96 %

## 2021-06-11 DIAGNOSIS — N18.5 CHRONIC KIDNEY DISEASE, STAGE 5 (HCC): Primary | ICD-10-CM

## 2021-06-11 PROCEDURE — 6370000000 HC RX 637 (ALT 250 FOR IP): Performed by: EMERGENCY MEDICINE

## 2021-06-11 RX ORDER — OXYCODONE HYDROCHLORIDE AND ACETAMINOPHEN 5; 325 MG/1; MG/1
1 TABLET ORAL EVERY 6 HOURS PRN
Qty: 12 TABLET | Refills: 0 | Status: SHIPPED | OUTPATIENT
Start: 2021-06-11 | End: 2021-06-14

## 2021-06-11 RX ORDER — ONDANSETRON 8 MG/1
8 TABLET, ORALLY DISINTEGRATING ORAL EVERY 8 HOURS PRN
Qty: 12 TABLET | Refills: 0 | Status: SHIPPED | OUTPATIENT
Start: 2021-06-11 | End: 2022-05-31

## 2021-06-11 RX ORDER — OXYCODONE HYDROCHLORIDE AND ACETAMINOPHEN 5; 325 MG/1; MG/1
1 TABLET ORAL ONCE
Status: COMPLETED | OUTPATIENT
Start: 2021-06-11 | End: 2021-06-11

## 2021-06-11 RX ORDER — TAMSULOSIN HYDROCHLORIDE 0.4 MG/1
0.4 CAPSULE ORAL DAILY
Qty: 15 CAPSULE | Refills: 0 | Status: SHIPPED | OUTPATIENT
Start: 2021-06-11 | End: 2021-07-26

## 2021-06-11 RX ADMIN — OXYCODONE HYDROCHLORIDE AND ACETAMINOPHEN 1 TABLET: 5; 325 TABLET ORAL at 01:38

## 2021-06-11 ASSESSMENT — PAIN SCALES - GENERAL
PAINLEVEL_OUTOF10: 4
PAINLEVEL_OUTOF10: 4

## 2021-06-11 ASSESSMENT — PAIN DESCRIPTION - LOCATION: LOCATION: FLANK

## 2021-06-11 NOTE — TELEPHONE ENCOUNTER
Pt called in she was in the ED yesterday and she wanted to know if we can send a referral to Urology for her.  Pt made appt to come in on 6/18 but she said she cannot wait that long for referral.

## 2021-06-11 NOTE — ED PROVIDER NOTES
This is a 78-year-old female with a past medical history of renal stones who presents to the ED for evaluation of abdominal pain. Patient states that for the past 2 weeks she has been having intermittent episodes of left sided flank pain. She states pain seems to come and go and has been worsening over the past 2 days. Patient states the pain is located on her left flank and intermittently radiates to her left lower quadrant of her abdomen. She states that she has been urinating well denies any dysuria or hematuria. Patient states that at times she does have some nausea denies any mitigating or exacerbating factors. Patient states that about 2 years ago she did have to have a stent placed for a kidney stone. She does like this to her typical kidney stones. The history is provided by the patient. No  was used. Review of Systems   Constitutional: Negative for fever. HENT: Negative for congestion. Eyes: Negative for visual disturbance. Respiratory: Negative for cough and shortness of breath. Cardiovascular: Negative for chest pain. Gastrointestinal: Positive for abdominal pain. Endocrine: Negative for polyuria. Genitourinary: Positive for flank pain. Negative for dysuria. Musculoskeletal: Negative for back pain. Skin: Negative for rash. Neurological: Negative for headaches. Hematological: Does not bruise/bleed easily. Psychiatric/Behavioral: Negative for confusion. Physical Exam  Vitals and nursing note reviewed. Constitutional:       General: She is not in acute distress. Appearance: She is well-developed. She is obese. HENT:      Head: Normocephalic and atraumatic. Mouth/Throat:      Mouth: Mucous membranes are moist.   Eyes:      Extraocular Movements: Extraocular movements intact. Pupils: Pupils are equal, round, and reactive to light. Neck:      Vascular: No JVD.    Cardiovascular:      Rate and Rhythm: Normal rate and regular rhythm. Heart sounds: No murmur heard. Pulmonary:      Effort: Pulmonary effort is normal.      Breath sounds: No wheezing, rhonchi or rales. Chest:      Chest wall: No tenderness. Abdominal:      General: There is no distension. Palpations: Abdomen is soft. Tenderness: There is no abdominal tenderness. There is left CVA tenderness. There is no guarding. Musculoskeletal:      Cervical back: Normal range of motion and neck supple. Right lower leg: No edema. Left lower leg: No edema. Skin:     General: Skin is warm and dry. Capillary Refill: Capillary refill takes less than 2 seconds. Neurological:      General: No focal deficit present. Mental Status: She is alert and oriented to person, place, and time. Cranial Nerves: No cranial nerve deficit. Psychiatric:         Mood and Affect: Mood normal.         Behavior: Behavior normal.          Procedures     MDM  Number of Diagnoses or Management Options  ALDA (acute kidney injury) ([facility] [address]  Hydronephrosis, unspecified hydronephrosis type  Kidney stone  Diagnosis management comments: Patient presented to the ED for evaluation of flank pain. She was nontoxic have some CVA tenderness and appeared somewhat dehydrated. Patient was treated with IV fluids was found to have a slight ALDA. CT imaging was concerning for multiple small renal stones although true less than 5 mm. Patient was treated with analgesics her pain was a 0 out of 10 on rechecks. I had a long discussion with the patient offered inpatient admission however she felt well pain was controlled shared decision-making was abdomen and we agreed on outpatient follow-up at this point time. Was advised to call and follow-up with urology and tomorrow states she follows up with urology at the [facility] clinic.   Was given strict return precautions agreeable this plan.                    --------------------------------------------- PAST HISTORY ---------------------------------------------  Past Medical History:  has a past medical history of History of blood transfusion, History of kidney stones, Hx of cardiovascular stress test, Hypertension, and Thyroid disease. Past Surgical History:  has a past surgical history that includes Hysterectomy; joint replacement (Right, 2019); Kidney stone surgery; hernia repair; Cholecystectomy, laparoscopic (N/A, 5/18/2020); and Hysterectomy, vaginal.    Social History:  reports that she has never smoked. She has never used smokeless tobacco. She reports previous alcohol use. She reports that she does not use drugs. Family History: family history includes COPD in her father; Cancer in her mother. The patients home medications have been reviewed. Allergies: Patient has no known allergies.     -------------------------------------------------- RESULTS -------------------------------------------------  Labs:  Results for orders placed or performed during the hospital encounter of 06/10/21   CBC Auto Differential   Result Value Ref Range    WBC 12.7 (H) 4.5 - 11.5 E9/L    RBC 4.41 3.50 - 5.50 E12/L    Hemoglobin 12.9 11.5 - 15.5 g/dL    Hematocrit 39.8 34.0 - 48.0 %    MCV 90.2 80.0 - 99.9 fL    MCH 29.3 26.0 - 35.0 pg    MCHC 32.4 32.0 - 34.5 %    RDW 14.6 11.5 - 15.0 fL    Platelets 283 192 - 954 E9/L    MPV 10.9 7.0 - 12.0 fL    Neutrophils % 67.1 43.0 - 80.0 %    Immature Granulocytes % 0.9 0.0 - 5.0 %    Lymphocytes % 21.5 20.0 - 42.0 %    Monocytes % 8.4 2.0 - 12.0 %    Eosinophils % 1.7 0.0 - 6.0 %    Basophils % 0.4 0.0 - 2.0 %    Neutrophils Absolute 8.51 (H) 1.80 - 7.30 E9/L    Immature Granulocytes # 0.11 E9/L    Lymphocytes Absolute 2.73 1.50 - 4.00 E9/L    Monocytes Absolute 1.06 (H) 0.10 - 0.95 E9/L    Eosinophils Absolute 0.21 0.05 - 0.50 E9/L    Basophils Absolute 0.05 0.00 - 0.20 E9/L   Comprehensive Metabolic Panel w/ Reflex to MG   Result Value Ref Range    Sodium 137 132 - 146 mmol/L Potassium reflex Magnesium 4.3 3.5 - 5.0 mmol/L    Chloride 102 98 - 107 mmol/L    CO2 23 22 - 29 mmol/L    Anion Gap 12 7 - 16 mmol/L    Glucose 255 (H) 74 - 99 mg/dL    BUN 32 (H) 6 - 23 mg/dL    CREATININE 1.7 (H) 0.5 - 1.0 mg/dL    GFR Non-African American 30 >=60 mL/min/1.73    GFR African American 37     Calcium 9.7 8.6 - 10.2 mg/dL    Total Protein 7.9 6.4 - 8.3 g/dL    Albumin 4.1 3.5 - 5.2 g/dL    Total Bilirubin 0.3 0.0 - 1.2 mg/dL    Alkaline Phosphatase 135 (H) 35 - 104 U/L    ALT 51 (H) 0 - 32 U/L    AST 41 (H) 0 - 31 U/L   Urinalysis, reflex to microscopic   Result Value Ref Range    Color, UA Yellow Straw/Yellow    Clarity, UA Clear Clear    Glucose, Ur Negative Negative mg/dL    Bilirubin Urine Negative Negative    Ketones, Urine Negative Negative mg/dL    Specific Gravity, UA 1.025 1.005 - 1.030    Blood, Urine MODERATE (A) Negative    pH, UA 5.5 5.0 - 9.0    Protein, UA Negative Negative mg/dL    Urobilinogen, Urine 0.2 <2.0 E.U./dL    Nitrite, Urine Negative Negative    Leukocyte Esterase, Urine Negative Negative   Microscopic Urinalysis   Result Value Ref Range    WBC, UA NONE 0 - 5 /HPF    RBC, UA 1-3 0 - 2 /HPF    Bacteria, UA NONE SEEN None Seen /HPF    Crystals, UA Moderate (A) None Seen /HPF       Radiology:  CT ABDOMEN PELVIS WO CONTRAST Additional Contrast? None   Final Result   Moderate hydronephrosis and edema in the right kidney due to at least 3   moderately obstructing distal right ureteral calculus ranging in size from   2-4 mm. Additional 2-4 mm nonobstructing bilateral kidney stones are present. Surgical clips in the left renal hilum.             ------------------------- NURSING NOTES AND VITALS REVIEWED ---------------------------  Date / Time Roomed:  6/10/2021 10:36 PM  ED Bed Assignment:  10/10    The nursing notes within the ED encounter and vital signs as below have been reviewed.    BP (!) 170/102   Pulse 83   Temp 97.2 °F (36.2 °C) (Temporal)   Resp 18   Ht 5' 3\" 288 [facility],   06/11/21 1330

## 2021-06-11 NOTE — ED TRIAGE NOTES
FIRST PROVIDER CONTACT ASSESSMENT NOTE      Department of Emergency Medicine   Admit Date: No admission date for patient encounter. Chief Complaint: Flank Pain (bilateral flank pain, blood in urine, hx of stones)      History of Present Illness:    Jamal Zavala is a 56 y.o. female who presents to the ED for left flank pain. Patient states that she has kidney stones around her left kidney. Pain has been going on for 2 weeks. Getting worse. Does admit to chills and nausea.         -----------------END OF FIRST PROVIDER CONTACT ASSESSMENT NOTE--------------  Electronically signed by PHILLIP Arguelles   DD: 6/10/21

## 2021-06-16 RX ORDER — IBUPROFEN 800 MG/1
800 TABLET ORAL EVERY 8 HOURS PRN
Qty: 270 TABLET | Refills: 0 | OUTPATIENT
Start: 2021-06-16 | End: 2021-09-14

## 2021-06-17 ENCOUNTER — HOSPITAL ENCOUNTER (OUTPATIENT)
Dept: GENERAL RADIOLOGY | Age: 61
Discharge: HOME | End: 2021-06-19
Payer: MEDICAID

## 2021-06-17 ENCOUNTER — OFFICE VISIT (OUTPATIENT)
Dept: BREAST CENTER | Age: 61
End: 2021-06-17
Payer: MEDICAID

## 2021-06-17 ENCOUNTER — HOSPITAL ENCOUNTER (OUTPATIENT)
Dept: GENERAL RADIOLOGY | Age: 61
End: 2021-06-17
Payer: MEDICAID

## 2021-06-17 VITALS
RESPIRATION RATE: 20 BRPM | TEMPERATURE: 97.6 F | SYSTOLIC BLOOD PRESSURE: 128 MMHG | BODY MASS INDEX: 49.61 KG/M2 | HEART RATE: 62 BPM | WEIGHT: 280 LBS | OXYGEN SATURATION: 98 % | DIASTOLIC BLOOD PRESSURE: 70 MMHG | HEIGHT: 63 IN

## 2021-06-17 DIAGNOSIS — N60.02 BILATERAL BREAST CYSTS: ICD-10-CM

## 2021-06-17 DIAGNOSIS — N63.10 MASSES OF BOTH BREASTS: Primary | ICD-10-CM

## 2021-06-17 DIAGNOSIS — N64.4 BREAST PAIN, LEFT: ICD-10-CM

## 2021-06-17 DIAGNOSIS — N60.01 BILATERAL BREAST CYSTS: ICD-10-CM

## 2021-06-17 DIAGNOSIS — N64.4 BREAST PAIN: ICD-10-CM

## 2021-06-17 DIAGNOSIS — N60.02 CYST OF LEFT BREAST: ICD-10-CM

## 2021-06-17 DIAGNOSIS — N63.20 MASSES OF BOTH BREASTS: Primary | ICD-10-CM

## 2021-06-17 DIAGNOSIS — N60.01 CYST OF RIGHT BREAST: ICD-10-CM

## 2021-06-17 PROBLEM — R07.9 CHEST PAIN: Status: RESOLVED | Noted: 2019-12-05 | Resolved: 2021-06-17

## 2021-06-17 PROBLEM — R31.9 URINARY TRACT INFECTION WITH HEMATURIA: Status: RESOLVED | Noted: 2017-12-12 | Resolved: 2021-06-17

## 2021-06-17 PROBLEM — N39.0 URINARY TRACT INFECTION WITH HEMATURIA: Status: RESOLVED | Noted: 2017-12-12 | Resolved: 2021-06-17

## 2021-06-17 PROCEDURE — G0279 TOMOSYNTHESIS, MAMMO: HCPCS

## 2021-06-17 PROCEDURE — 76642 ULTRASOUND BREAST LIMITED: CPT

## 2021-06-17 PROCEDURE — G8427 DOCREV CUR MEDS BY ELIG CLIN: HCPCS | Performed by: NURSE PRACTITIONER

## 2021-06-17 PROCEDURE — 99203 OFFICE O/P NEW LOW 30 MIN: CPT | Performed by: NURSE PRACTITIONER

## 2021-06-17 PROCEDURE — 3017F COLORECTAL CA SCREEN DOC REV: CPT | Performed by: NURSE PRACTITIONER

## 2021-06-17 PROCEDURE — 1036F TOBACCO NON-USER: CPT | Performed by: NURSE PRACTITIONER

## 2021-06-17 PROCEDURE — 99204 OFFICE O/P NEW MOD 45 MIN: CPT | Performed by: NURSE PRACTITIONER

## 2021-06-17 PROCEDURE — G8417 CALC BMI ABV UP PARAM F/U: HCPCS | Performed by: NURSE PRACTITIONER

## 2021-06-17 ASSESSMENT — ENCOUNTER SYMPTOMS
EYE DISCHARGE: 0
CHOKING: 0
SINUS PRESSURE: 0
TROUBLE SWALLOWING: 0
NAUSEA: 0
VOICE CHANGE: 0
COUGH: 0
EYE ITCHING: 0
BLOOD IN STOOL: 0
ABDOMINAL DISTENTION: 0
VOMITING: 0
CHEST TIGHTNESS: 0
ABDOMINAL PAIN: 0
SHORTNESS OF BREATH: 1
SORE THROAT: 0
CONSTIPATION: 0
DIARRHEA: 0
WHEEZING: 0
BACK PAIN: 0
RHINORRHEA: 0
SINUS PAIN: 0

## 2021-06-17 NOTE — LETTER
[address] [address]  Phone: [phone]  Fax: 614 [facility], APRN - CNP    June 17, 2021     Danny Parker [place] [id number] [address]    Patient: Javier Joaquin   MR Number: [id number]   YOB: 1960   Date of Visit: 6/17/2021       Dear Danny Parker [facility]:    Thank you for referring Luis Eduardo Grullon to the office of Dr. Aravind Reno and nurse practitioner, Jairo Thompson. Below are the relevant portions of my assessment and plan of care. 56 y.o. pleasant female who's risk for breast cancer include gender and obesity. She presents without breast related complaints after a screening mammogram at [facility]. 2/23/21 - Bilateral screening mammogram - [facility]:      Impression   1. Bilateral heterogeneously dense breasts. 2. Bilateral well-circumscribed masses, most likely cysts with the largest in   the left breast at the 12 o'clock position measuring 3.7 cm and in the right   upper outer quadrant measuring 1.6 cm.  Further evaluation with bilateral   breast ultrasound is suggested. BIRADS:   BIRADS - CATEGORY 0       3/4/21 - Bilateral breast ultrasound - [facility]:      Impression   1. Multiple bilateral cysts including a 2.2 x 1.9 x 0.9 cm cyst at the 9   o'clock position in the right breast and 3.5 x 2.8 x 3.6 cm cyst at the 12   o'clock position of the left breast.  These probably correspond to the   largest nodular densities seen on the recent mammogram.   2. 4.6 x 2.6 x 5 cm cyst seen at the 5 o'clock position in the left breast.   This was not seen with certainty on the mammogram.   3. Unless otherwise clinically indicated, follow-up bilateral mammography and   ultrasound in 6 months is suggested.       BIRADS:   BIRADS - CATEGORY 3       Probably Benign Findings.  A short interval follow-up is recommended in 6   months.           06/17/2021: B/L DIAGNOSTIC MAMMOGRAM AND B/L breast US @ [facility]    FINDINGS:   Bilateral diagnostic mammogram: Breast tissue is heterogeneously dense which   may obscure small masses.  Scattered bilateral well-circumscribed, oval   masses again noted.  No suspicious mass, microcalcifications, or   architectural distortion identified in either breast.       Bilateral diagnostic ultrasound: Targeted bilateral breast ultrasound was   performed utilizing high-resolution, real-time scanning.  Again noted are   multiple hypoechoic masses of varying size with well-circumscribed margins   scattered throughout both breasts. Alvaro Adas are most suggestive of complicated   cysts.  No suspicious cystic or solid mass identified in either breast.           Impression   Bilateral oval masses are probably benign.       Recommendation:       Six-month follow-up bilateral diagnostic ultrasound is advised.       BIRADS:   BIRADS - CATEGORY 3       Probably Benign Findings. A short interval follow-up is recommended in 6   months.       OVERALL ASSESSMENT - PROBABLY BENIGN. During pt's visit today, a [facility] Risk Evaluation was performed. Pt has a 7% lifetime risk (to age 80) of developing breast cancer. Clinically, she has bilateral, firm, mobile nodular densities most consistent with complex cysts. There is no associated skin changes and no axillary lymphadenopathy. We reviewed her imaging today, including her BI-RADS result. We discussed that a BI-RADS 3 category finding on mammography is a particularly challenging category that can cause confusion and anxiety for patients. This assessment means that the lesion is one of a few specific findings that has been shown to have less than a 2 percent chance of being cancer and that any cancer present is not likely to spread in the time frame recommended for follow-up imaging.     We discussed that I cannot guarantee that she does not have breast cancer now; nor can I guarantee that she won't develop breast cancer in the future. However, there were no concerning findings identified on this visit. We reviewed healthy lifestyle and she was strongly counseled on weight management; offered referral to weight loss physician to improve her overall health which she declined. We also discussed avoiding alcohol, and BSE in detail. At this time, we will plan to have her return in 6 months with repeat B/L diagnostic US; sooner for new or changing findings. Continue monthly breast/chest wall self examination; detailed instructions reviewed today. Bring any changes to your physician's attention. Encouraged low calorie diet to assist in weight loss. Avoid alcohol. Limit caffeine intake. Wear a good support bra at all times. Repeat lateral diagnostic ultrasounds in 6 months  Continue follow up with Primary Care/Gynecology. RTC 6 months with imaging prior. If you have questions, please do not hesitate to call me. We look forward to following Sharla along with you. Sincerely,        Jammie Gilford, APRN - CNP   Arleene Babinski, RN, MSN, APRN-CNP, [address]  Advanced Oncology Certified Nurse Practitioner  Department of Breast Surgery  [facility] [place]/  [facility] [place] in collaboration with Dr. Wyatt Krishnamurthy.  Cheryl/Dr. Mikaela Wills

## 2021-06-17 NOTE — PATIENT INSTRUCTIONS

## 2021-06-18 ENCOUNTER — OFFICE VISIT (OUTPATIENT)
Dept: FAMILY MEDICINE CLINIC | Age: 61
End: 2021-06-18
Payer: MEDICAID

## 2021-06-18 VITALS
DIASTOLIC BLOOD PRESSURE: 82 MMHG | TEMPERATURE: 98.6 F | SYSTOLIC BLOOD PRESSURE: 124 MMHG | HEIGHT: 63 IN | BODY MASS INDEX: 50.68 KG/M2 | RESPIRATION RATE: 18 BRPM | WEIGHT: 286 LBS | HEART RATE: 70 BPM | OXYGEN SATURATION: 96 %

## 2021-06-18 DIAGNOSIS — E03.9 ACQUIRED HYPOTHYROIDISM: ICD-10-CM

## 2021-06-18 DIAGNOSIS — Z79.4 TYPE 2 DIABETES MELLITUS WITHOUT COMPLICATION, WITH LONG-TERM CURRENT USE OF INSULIN (HCC): ICD-10-CM

## 2021-06-18 DIAGNOSIS — N60.02 BILATERAL BREAST CYSTS: ICD-10-CM

## 2021-06-18 DIAGNOSIS — E11.9 TYPE 2 DIABETES MELLITUS WITHOUT COMPLICATION, WITH LONG-TERM CURRENT USE OF INSULIN (HCC): ICD-10-CM

## 2021-06-18 DIAGNOSIS — I10 ESSENTIAL HYPERTENSION: Primary | ICD-10-CM

## 2021-06-18 DIAGNOSIS — N13.30 HYDRONEPHROSIS, UNSPECIFIED HYDRONEPHROSIS TYPE: ICD-10-CM

## 2021-06-18 DIAGNOSIS — N20.0 RECURRENT KIDNEY STONES: ICD-10-CM

## 2021-06-18 DIAGNOSIS — N60.01 BILATERAL BREAST CYSTS: ICD-10-CM

## 2021-06-18 DIAGNOSIS — R06.83 SNORES: ICD-10-CM

## 2021-06-18 DIAGNOSIS — N18.32 STAGE 3B CHRONIC KIDNEY DISEASE (HCC): ICD-10-CM

## 2021-06-18 LAB
BILIRUBIN, POC: NEGATIVE
BLOOD URINE, POC: NORMAL
CLARITY, POC: NORMAL
COLOR, POC: YELLOW
CREATININE URINE POCT: 100
GLUCOSE URINE, POC: NEGATIVE
KETONES, POC: NEGATIVE
LEUKOCYTE EST, POC: NEGATIVE
MICROALBUMIN/CREAT 24H UR: 10 MG/G{CREAT}
MICROALBUMIN/CREAT UR-RTO: <30
NITRITE, POC: NEGATIVE
PH, POC: 5.5
PROTEIN, POC: NEGATIVE
SPECIFIC GRAVITY, POC: 1.02
UROBILINOGEN, POC: 0.2

## 2021-06-18 PROCEDURE — G8417 CALC BMI ABV UP PARAM F/U: HCPCS | Performed by: FAMILY MEDICINE

## 2021-06-18 PROCEDURE — 81003 URINALYSIS AUTO W/O SCOPE: CPT | Performed by: FAMILY MEDICINE

## 2021-06-18 PROCEDURE — 82044 UR ALBUMIN SEMIQUANTITATIVE: CPT | Performed by: FAMILY MEDICINE

## 2021-06-18 PROCEDURE — 3052F HG A1C>EQUAL 8.0%<EQUAL 9.0%: CPT | Performed by: FAMILY MEDICINE

## 2021-06-18 PROCEDURE — 1036F TOBACCO NON-USER: CPT | Performed by: FAMILY MEDICINE

## 2021-06-18 PROCEDURE — 99214 OFFICE O/P EST MOD 30 MIN: CPT | Performed by: FAMILY MEDICINE

## 2021-06-18 PROCEDURE — 3017F COLORECTAL CA SCREEN DOC REV: CPT | Performed by: FAMILY MEDICINE

## 2021-06-18 PROCEDURE — 2022F DILAT RTA XM EVC RTNOPTHY: CPT | Performed by: FAMILY MEDICINE

## 2021-06-18 PROCEDURE — G8427 DOCREV CUR MEDS BY ELIG CLIN: HCPCS | Performed by: FAMILY MEDICINE

## 2021-06-21 ENCOUNTER — TELEPHONE (OUTPATIENT)
Dept: FAMILY MEDICINE CLINIC | Age: 61
End: 2021-06-21

## 2021-06-23 NOTE — TELEPHONE ENCOUNTER
This MA attempted to reach pt. No answer. This MA left message for pt requesting return call to discuss mediations.     Electronically signed by Dolores Lawler on 6/23/21 at 2:45 PM EDT

## 2021-06-24 ASSESSMENT — ENCOUNTER SYMPTOMS
EYE DISCHARGE: 0
ABDOMINAL DISTENTION: 0
FACIAL SWELLING: 0
VOICE CHANGE: 0
WHEEZING: 0
ALLERGIC/IMMUNOLOGIC NEGATIVE: 1
BACK PAIN: 1
SORE THROAT: 0
SHORTNESS OF BREATH: 0
SINUS PAIN: 0
SINUS PRESSURE: 0
CHOKING: 0
RECTAL PAIN: 0
COLOR CHANGE: 0
ABDOMINAL PAIN: 0
EYE REDNESS: 0
COUGH: 0
STRIDOR: 0
TROUBLE SWALLOWING: 0
DIARRHEA: 0
CHEST TIGHTNESS: 0
VOMITING: 0
RHINORRHEA: 0
CONSTIPATION: 0
NAUSEA: 0
ANAL BLEEDING: 0
EYES NEGATIVE: 1
EYE PAIN: 0
EYE ITCHING: 0
PHOTOPHOBIA: 0

## 2021-06-25 PROBLEM — R06.83 SNORES: Status: ACTIVE | Noted: 2021-06-25

## 2021-06-25 PROBLEM — N18.32 STAGE 3B CHRONIC KIDNEY DISEASE (HCC): Status: ACTIVE | Noted: 2021-06-25

## 2021-06-25 PROBLEM — N13.30 HYDRONEPHROSIS: Status: ACTIVE | Noted: 2021-06-25

## 2021-06-25 PROBLEM — N20.0 RECURRENT KIDNEY STONES: Status: ACTIVE | Noted: 2021-06-25

## 2021-06-25 ASSESSMENT — ENCOUNTER SYMPTOMS: BLOOD IN STOOL: 0

## 2021-06-25 NOTE — TELEPHONE ENCOUNTER
This MA attempted to reach pt. No answer. This MA left message for pt requesting return call to discuss medications.     Electronically signed by Dorothea Corado on 6/25/21 at 10:05 AM EDT

## 2021-06-25 NOTE — PROGRESS NOTES
Jose Luis Smith is a 56 y.o. female. HPI/Chief C/O:  Chief Complaint   Patient presents with   [facility] ED Follow-up     Pt here to follow up from ED for ALDA, hydronephrosis, and kidney stones, pt had referral to Urology placed the following day by our office and has visit with them today 6/18/21     No Known Allergies    This 56year old female presents for ED follow up 6/10/2021. Pt follows with [facility] for breast mass, the breast mass is being rechecked in 6 months. A1C decreased from 13.0 in 2/2021 and in 5/2021 is 8.0. pt states colonoscopy was good, will get records. Pt was seen in ED on 6/10/2021for hydronephrosis, kidney stone, and CKD. Pt states she has less pain today, will set up nephrology, and urology. Pt states she snores and is tired during the day. ROS:  Review of Systems   Constitutional: Positive for fatigue. Negative for activity change, appetite change, chills, diaphoresis, fever and unexpected weight change. HENT: Negative. Negative for congestion, dental problem, drooling, ear discharge, ear pain, facial swelling, hearing loss, mouth sores, nosebleeds, postnasal drip, rhinorrhea, sinus pressure, sinus pain, sneezing, sore throat, tinnitus, trouble swallowing and voice change. Eyes: Negative. Negative for photophobia, pain, discharge, redness, itching and visual disturbance. Respiratory: Negative for cough, choking, chest tightness, shortness of breath, wheezing and stridor. Cardiovascular: Negative. Negative for chest pain, palpitations and leg swelling. Gastrointestinal: Negative for abdominal distention, abdominal pain, anal bleeding, blood in stool, constipation, diarrhea, nausea, rectal pain and vomiting. Endocrine: Negative for cold intolerance, heat intolerance, polydipsia, polyphagia and polyuria. Genitourinary: Positive for flank pain, frequency and urgency.  Negative for decreased urine volume, difficulty urinating, dysuria, genital sores, hematuria, menstrual problem and pelvic pain. Musculoskeletal: Positive for arthralgias, back pain and myalgias. Negative for gait problem, joint swelling, neck pain and neck stiffness. Skin: Negative. Negative for color change, pallor, rash and wound. Allergic/Immunologic: Negative. Neurological: Negative. Negative for dizziness, tremors, seizures, syncope, facial asymmetry, speech difficulty, weakness, light-headedness, numbness and headaches. Hematological: Negative. Negative for adenopathy. Does not bruise/bleed easily. Psychiatric/Behavioral: Negative for agitation, behavioral problems, confusion, decreased concentration, dysphoric mood, hallucinations, self-injury, sleep disturbance and suicidal ideas. The patient is nervous/anxious. The patient is not hyperactive.          Past Medical/Surgical Hx;  Reviewed with patient      Diagnosis Date    History of blood transfusion 0768    [facility] complication after kidney stone surgery    History of kidney stones     Hx of cardiovascular stress test 12/17/2019    Lexiscan stress test    Hypertension     Hypothyroidism     Obesity     Thyroid disease     Type 2 diabetes mellitus without complication (HCC)      Past Surgical History:   Procedure Laterality Date    CHOLECYSTECTOMY, LAPAROSCOPIC N/A 5/18/2020    LAPAROSCOPIC CHOLECYSTECTOMY WITH INTRAOPERATIVE CHOLANGIOGRAM performed by Lis Kelly MD at [facility]    HYSTERECTOMY      HYSTERECTOMY, VAGINAL      JOINT REPLACEMENT Right 2019    knee  [facility]    KIDNEY STONE SURGERY      incisional removal       Past Family Hx:  Reviewed with patient      Problem Relation Age of Onset    Lung Cancer Mother         lung    COPD Father        Social Hx:  Reviewed with patient  Social History     Tobacco Use    Smoking status: Never Smoker    Smokeless tobacco: Never Used   Substance Use Topics    Alcohol use: Not Currently       OBJECTIVE  /82   Pulse 70 Temp 98.6 °F (37 °C) (Temporal)   Resp 18   Ht 5' 3\" (1.6 m)   Wt 286 lb (129.7 kg)   LMP  (LMP Unknown)   SpO2 96%   Breastfeeding No   BMI 50.66 kg/m²     Problem List:  Sharla does not have any pertinent problems on file. PHYS EX:  Physical Exam  Vitals and nursing note reviewed. Constitutional:       General: She is not in acute distress. Appearance: Normal appearance. She is well-developed. She is obese. She is not ill-appearing, toxic-appearing or diaphoretic. Comments: Patient has morbid obesity. Patient instructed on low calorie, healthy ADA diet. HENT:      Head: Normocephalic and atraumatic. Nose: Nose normal. No congestion or rhinorrhea. Eyes:      General: No scleral icterus. Right eye: No discharge. Left eye: No discharge. Conjunctiva/sclera: Conjunctivae normal.      Pupils: Pupils are equal, round, and reactive to light. Neck:      Thyroid: No thyromegaly. Vascular: No carotid bruit or JVD. Trachea: No tracheal deviation. Cardiovascular:      Rate and Rhythm: Normal rate and regular rhythm. Pulses: Normal pulses. Heart sounds: Normal heart sounds. No murmur heard. No friction rub. No gallop. Pulmonary:      Effort: Pulmonary effort is normal. No respiratory distress. Breath sounds: Normal breath sounds. No stridor. No wheezing, rhonchi or rales. Chest:      Chest wall: No tenderness. Abdominal:      General: Bowel sounds are normal. There is no distension. Palpations: Abdomen is soft. There is no mass. Tenderness: There is no abdominal tenderness. There is no right CVA tenderness, left CVA tenderness, guarding or rebound. Hernia: No hernia is present. Musculoskeletal:         General: Tenderness present. No swelling, deformity or signs of injury. Cervical back: Normal range of motion and neck supple. No rigidity. No muscular tenderness. Right lower leg: No edema.       Left lower leg: No edema. Comments: Pain and decreased ROM multiple joints. Lymphadenopathy:      Cervical: No cervical adenopathy. Skin:     General: Skin is warm. Coloration: Skin is not jaundiced or pale. Findings: No bruising, erythema, lesion or rash. Neurological:      General: No focal deficit present. Mental Status: She is alert and oriented to person, place, and time. Cranial Nerves: No cranial nerve deficit. Sensory: No sensory deficit. Motor: No weakness or abnormal muscle tone. Coordination: Coordination normal.      Gait: Gait normal.      Deep Tendon Reflexes: Reflexes are normal and symmetric. Reflexes normal.   Psychiatric:         Mood and Affect: Mood normal.         Behavior: Behavior normal.         Thought Content: Thought content normal.         Judgment: Judgment normal.         ASSESSMENT/PLAN  Sharla was seen today for ed follow-up. Diagnoses and all orders for this visit:    Essential hypertension  Controlled. HCTZ, ace, BB, statin, aspirin, low salt diet. Acquired hypothyroidism  Controlled. Synthroid. Type 2 diabetes mellitus without complication, with long-term current use of insulin (HCC)  -     POCT Microalbumin  Controlled. Lantus, ace, statin, aspirin, ADA diet. Bilateral breast cysts  Stable. [address] [facility]. Snores  -     Baseline Diagnostic Sleep Study; Future  Not controlled. Stage 3b chronic kidney disease ([address]  -     POCT Urinalysis No Micro (Auto)  -     [facility]FL - Danni Stephens MD, Nephrology, [place]  Stable. Hydronephrosis, unspecified hydronephrosis type  Stable. Urology is set up. Recurrent kidney stones  -     POCT Urinalysis No Micro (Auto)  Not controlled. Urology. Pt instructed if any worse go ED ASAP.     Outpatient Encounter Medications as of 6/18/2021   Medication Sig Dispense Refill    ondansetron (ZOFRAN ODT) 8 MG TBDP disintegrating tablet Take 1 tablet by mouth every 8 hours as needed for Nausea 12 tablet 0  tamsulosin (FLOMAX) 0.4 MG capsule Take 1 capsule by mouth daily 15 capsule 0    blood glucose monitor strips Test 3 times a day & as needed for symptoms of irregular blood glucose. Dispense sufficient amount for indicated testing frequency plus additional to accommodate PRN testing needs. ONE TOUCH ULTRA. 300 strip 1    pravastatin (PRAVACHOL) 10 MG tablet Take 1 tablet by mouth daily 30 tablet 2    insulin glargine (LANTUS SOLOSTAR) 100 UNIT/ML injection pen Inject 14 Units into the skin nightly 5 pen 3    aspirin EC 81 MG EC tablet Take 1 tablet by mouth daily 90 tablet 1    Insulin Pen Needle 31G X 5 MM MISC 1 each by Does not apply route daily 100 each 3    hydroCHLOROthiazide (HYDRODIURIL) 25 MG tablet take 1 tablet by mouth once daily 90 tablet 1    levothyroxine (SYNTHROID) 75 MCG tablet take 1 tablet by mouth once daily 90 tablet 1    citalopram (CELEXA) 20 MG tablet take 1 tablet by mouth once daily 90 tablet 1    lisinopril (PRINIVIL;ZESTRIL) 40 MG tablet take 1 tablet by mouth once daily 90 tablet 1    metoprolol succinate (TOPROL XL) 100 MG extended release tablet take 1 tablet by mouth once daily 90 tablet 1    albuterol sulfate  (90 Base) MCG/ACT inhaler Inhale 2 puffs into the lungs every 6 hours as needed for Wheezing or Shortness of Breath 1 Inhaler 3    hydrocortisone 2.5 % cream Apply topically 2 times daily. 1 Tube 0    [DISCONTINUED] ibuprofen (IBU) 800 MG tablet Take 1 tablet by mouth every 8 hours as needed for Pain 270 tablet 0     No facility-administered encounter medications on file as of 6/18/2021. Return in about 2 months (around 8/18/2021).         Reviewed recent labs related to Sharla's current problems      Discussed importance of regular Health Maintenance follow up  Health Maintenance   Topic    Pneumococcal 0-64 years Vaccine (1 of 4 - PCV13)    Diabetic foot exam     COVID-19 Vaccine (1)    HIV screen     Shingles Vaccine (1 of 2)    Diabetic retinal exam     Flu vaccine (Season Ended)    Lipid screen     TSH testing     A1C test (Diabetic or Prediabetic)     Potassium monitoring     Creatinine monitoring     Breast cancer screen     DTaP/Tdap/Td vaccine (3 - Td or Tdap)    Colon cancer screen colonoscopy     Hepatitis C screen     Hepatitis A vaccine     Hib vaccine     Meningococcal (ACWY) vaccine

## 2021-06-28 ENCOUNTER — TELEPHONE (OUTPATIENT)
Dept: SLEEP CENTER | Age: 61
End: 2021-06-28

## 2021-06-29 NOTE — TELEPHONE ENCOUNTER
This MA attempted to reach pt. No answer. This MA left message for pt requesting return call to discuss medications.     Electronically signed by Khai May on 6/29/21 at 7:40 AM EDT

## 2021-07-01 NOTE — TELEPHONE ENCOUNTER
Patient is not taking Ibuprofen and has been instructed not to take any NSAIDS. Medicines reviewed.   Electronically signed by Ana Lilia Richardson on 7/1/2021 at 10:58 AM

## 2021-07-02 ENCOUNTER — TELEPHONE (OUTPATIENT)
Dept: SLEEP CENTER | Age: 61
End: 2021-07-02

## 2021-07-13 ENCOUNTER — HOSPITAL ENCOUNTER (OUTPATIENT)
Dept: NUCLEAR MEDICINE | Age: 61
Discharge: HOME | End: 2021-07-13
Payer: MEDICAID

## 2021-07-13 DIAGNOSIS — N20.1 CALCULUS OF URETER: ICD-10-CM

## 2021-07-13 PROCEDURE — 78708 K FLOW/FUNCT IMAGE W/DRUG: CPT

## 2021-07-13 PROCEDURE — A9562 TC99M MERTIATIDE: HCPCS | Performed by: RADIOLOGY

## 2021-07-13 PROCEDURE — 3430000000 HC RX DIAGNOSTIC RADIOPHARMACEUTICAL: Performed by: RADIOLOGY

## 2021-07-13 PROCEDURE — 6360000002 HC RX W HCPCS: Performed by: RADIOLOGY

## 2021-07-13 RX ADMIN — Medication 10 MILLICURIE: at 12:45

## 2021-07-13 RX ADMIN — FUROSEMIDE 40 MG: 10 INJECTION, SOLUTION INTRAVENOUS at 13:07

## 2021-07-26 ENCOUNTER — NURSE ONLY (OUTPATIENT)
Dept: CARDIOLOGY CLINIC | Age: 61
End: 2021-07-26

## 2021-07-26 ENCOUNTER — OFFICE VISIT (OUTPATIENT)
Dept: CARDIOLOGY CLINIC | Age: 61
End: 2021-07-26
Payer: MEDICAID

## 2021-07-26 VITALS
HEART RATE: 63 BPM | SYSTOLIC BLOOD PRESSURE: 120 MMHG | HEIGHT: 63 IN | DIASTOLIC BLOOD PRESSURE: 84 MMHG | BODY MASS INDEX: 50.14 KG/M2 | WEIGHT: 283 LBS

## 2021-07-26 DIAGNOSIS — R00.2 PALPITATIONS: Primary | ICD-10-CM

## 2021-07-26 DIAGNOSIS — R00.2 PALPITATIONS: ICD-10-CM

## 2021-07-26 DIAGNOSIS — R06.02 SHORTNESS OF BREATH: Primary | ICD-10-CM

## 2021-07-26 PROCEDURE — 99204 OFFICE O/P NEW MOD 45 MIN: CPT | Performed by: INTERNAL MEDICINE

## 2021-07-26 PROCEDURE — G8427 DOCREV CUR MEDS BY ELIG CLIN: HCPCS | Performed by: INTERNAL MEDICINE

## 2021-07-26 PROCEDURE — 93000 ELECTROCARDIOGRAM COMPLETE: CPT | Performed by: INTERNAL MEDICINE

## 2021-07-26 PROCEDURE — G8417 CALC BMI ABV UP PARAM F/U: HCPCS | Performed by: INTERNAL MEDICINE

## 2021-07-26 NOTE — PROGRESS NOTES
Patient was seen by Dr. Alicia Paez and a 48 hour holter was ordered. She was instructed on the use of the monitor and voiced understanding. Monitor was applied.

## 2021-07-26 NOTE — PROGRESS NOTES
[facility] Cardiology consult  Dr. Erasmo Mixon      Reason for Consult: Palpitations  Referring Physician: Tommie Olivera DO     CHIEF COMPLAINT:   Chief Complaint   Patient presents with    Palpitations     Consult per Dr Tana Shetty due to palpitations and chest pain. Patient states the chest pain has been going on for 2 months. Stress and echo done 2019. Patient is under a lot of stress. HISTORY OF PRESENT ILLNESS:   56year old female with history of diabetes, hypertension and hypothyroidism is here for evaluation of palpitations. Patient is complaining of palpitations, comes and goes, at rest and with exertion, has been going on for the last couple of months, patient states she is has been under a lot of stress lately, occasional chest pressure when she is stressed out, last for a minute or 2, occasional shortness of breath, easy fatigability, denies any pedal edema, no PND, no orthopnea, no syncope, no presyncopal episodes.       Past Medical History:   Diagnosis Date    History of blood transfusion 6508    at [facility] complication after kidney stone surgery    History of kidney stones     Hx of cardiovascular stress test 12/17/2019    Lexiscan stress test    Hypertension     Hypothyroidism     Obesity     Thyroid disease     Type 2 diabetes mellitus without complication ([facility] [address]          Past Surgical History:   Procedure Laterality Date    CHOLECYSTECTOMY, LAPAROSCOPIC N/A 5/18/2020    LAPAROSCOPIC CHOLECYSTECTOMY WITH INTRAOPERATIVE CHOLANGIOGRAM performed by Josiah Loco MD at [place] [address], VAGINAL      JOINT REPLACEMENT Right 2019    knee  [facility]    KIDNEY STONE SURGERY      incisional removal         Current Outpatient Medications   Medication Sig Dispense Refill    ondansetron (ZOFRAN ODT) 8 MG TBDP disintegrating tablet Take 1 tablet by mouth every 8 hours as needed for Nausea 12 tablet 0    blood glucose monitor strips Test 3 times a day & as needed for symptoms of irregular blood glucose. Dispense sufficient amount for indicated testing frequency plus additional to accommodate PRN testing needs. ONE TOUCH ULTRA. 300 strip 1    aspirin EC 81 MG EC tablet Take 1 tablet by mouth daily 90 tablet 1    Insulin Pen Needle 31G X 5 MM MISC 1 each by Does not apply route daily 100 each 3    hydroCHLOROthiazide (HYDRODIURIL) 25 MG tablet take 1 tablet by mouth once daily 90 tablet 1    levothyroxine (SYNTHROID) 75 MCG tablet take 1 tablet by mouth once daily 90 tablet 1    citalopram (CELEXA) 20 MG tablet take 1 tablet by mouth once daily 90 tablet 1    lisinopril (PRINIVIL;ZESTRIL) 40 MG tablet take 1 tablet by mouth once daily 90 tablet 1    metoprolol succinate (TOPROL XL) 100 MG extended release tablet take 1 tablet by mouth once daily 90 tablet 1    albuterol sulfate  (90 Base) MCG/ACT inhaler Inhale 2 puffs into the lungs every 6 hours as needed for Wheezing or Shortness of Breath 1 Inhaler 3    insulin glargine (LANTUS SOLOSTAR) 100 UNIT/ML injection pen Inject 20 Units into the skin nightly 18 mL 1    tamsulosin (FLOMAX) 0.4 MG capsule Take 0.4 mg by mouth daily       No current facility-administered medications for this visit.          Allergies as of 07/26/2021    (No Known Allergies)       Social History     Socioeconomic History    Marital status:      Spouse name: Not on file    Number of children: Not on file    Years of education: Not on file    Highest education level: Not on file   Occupational History    Not on file   Tobacco Use    Smoking status: Never Smoker    Smokeless tobacco: Never Used   Vaping Use    Vaping Use: Never used   Substance and Sexual Activity    Alcohol use: Not Currently     Comment: No caffeine    Drug use: No    Sexual activity: Not on file   Other Topics Concern    Not on file   Social History Narrative    Not on file     Social Determinants of Health     Financial Resource Strain: Low Risk     Difficulty of Paying Living Expenses: Not hard at all   Food Insecurity: No Food Insecurity    Worried About Running Out of Food in the Last Year: Never true    Wojciech of Food in the Last Year: Never true   Transportation Needs: No Transportation Needs    Lack of Transportation (Medical): No    Lack of Transportation (Non-Medical):  No   Physical Activity:     Days of Exercise per Week:     Minutes of Exercise per Session:    Stress:     Feeling of Stress :    Social Connections:     Frequency of Communication with Friends and Family:     Frequency of Social Gatherings with Friends and Family:     Attends Congregation Services:     Active Member of Clubs or Organizations:     Attends Club or Organization Meetings:     Marital Status:    Intimate Partner Violence:     Fear of Current or Ex-Partner:     Emotionally Abused:     Physically Abused:     Sexually Abused:        Family History   Problem Relation Age of Onset    Lung Cancer Mother         lung    COPD Father        REVIEW OF SYSTEMS:     CONSTITUTIONAL:  negative for  fevers, chills, sweats and fatigue  HEENT:  negative for  tinnitus, earaches, nasal congestion and epistaxis  RESPIRATORY:  negative for  dry cough, cough with sputum, dyspnea, wheezing and hemoptysis  GASTROINTESTINAL:  negative for nausea, vomiting, diarrhea, constipation, pruritus and jaundice  HEMATOLOGIC/LYMPHATIC:  negative for easy bruising, bleeding, lymphadenopathy and petechiae  ENDOCRINE:  negative for heat intolerance, cold intolerance, tremor, hair loss and diabetic symptoms including neither polyuria nor polydipsia nor blurred vision  MUSCULOSKELETAL:  negative for  myalgias, arthralgias, joint swelling, stiff joints and decreased range of motion  NEUROLOGICAL:  negative for memory problems, speech problems, visual disturbance, dysphagia, weakness and numbness      PHYSICAL EXAM:   CONSTITUTIONAL:  awake, alert, cooperative, no apparent distress, and appears stated age  HEAD:  normocepalic, without obvious abnormality, atraumatic, pink, moist mucous membranes. NECK:  Supple, symmetrical, trachea midline, no adenopathy, thyroid symmetric, not enlarged and no tenderness, skin normal  LUNGS:  No increased work of breathing, good air exchange, clear to auscultation bilaterally, no crackles or wheezing  CARDIOVASCULAR:  Normal apical impulse, regular rate and rhythm, normal S1 and S2, no S3 or S4, and no murmur noted and no JVD, no carotid bruit, no pedal edema, good carotid upstroke bilaterally. ABDOMEN:  Soft, nontender, no masses, no hepatomegaly or splenomegaly, BS+  CHEST: nontender to palpation, expands symmetrically  MUSCULOSKELETAL:  No clubbing no cyanosis. there is no redness, warmth, or swelling of the joints  full range of motion noted  NEUROLOGIC:  Alert, awake,oriented x3. SKIN:  no bruising or bleeding, normal skin color, texture, turgor and no redness, warmth, or swelling        /84 (Site: Right Upper Arm, Position: Sitting, Cuff Size: Large Adult)   Pulse 63   Ht 5' 3\" (1.6 m)   Wt 283 lb (128.4 kg)   LMP  (LMP Unknown)   BMI 50.13 kg/m²     DATA:   I personally reviewed the visit EKG with the following interpretation: Sinus rhythm, poor R wave progression, nonspecific T wave changes    EKG 2/12/21 Normal sinus rhythm with sinus arrhythmia  Possible Anterior infarct , age undetermined  Abnormal ECG  When compared with ECG of 17-MAR-2020 04:18,  Borderline criteria for Anterior infarct are now present    ECHO: 12/6/19 Summary   Left ventricular size is grossly normal.   No evidence of left ventricular mass or thrombus noted. Normal left ventricular wall thickness. Ejection fraction is visually estimated at 57%. No regional wall motion abnormalities seen. The left atrium is mild-moderately dilated. Interatrial septum appears intact. No evidence of thrombus within left atrium.    Physiologic and/or trace mitral regurgitation is present. No evidence of mitral valve stenosis. Physiologic and/or trace tricuspid regurgitation. Regular rhythm. Stress Test: 12/7/19   Pharmacologic stress myocardial perfusion imaging within   normal limits.  Stress induced ischemia is not demonstrated.           Angiography  Cardiology Labs: BMP:    Lab Results   Component Value Date     08/12/2021    K 4.3 08/12/2021    K 4.3 06/10/2021    CL 97 08/12/2021    CO2 26 08/12/2021    BUN 22 08/12/2021    CREATININE 1.3 08/12/2021     CMP:    Lab Results   Component Value Date     08/12/2021    K 4.3 08/12/2021    K 4.3 06/10/2021    CL 97 08/12/2021    CO2 26 08/12/2021    BUN 22 08/12/2021    CREATININE 1.3 08/12/2021    PROT 7.9 08/12/2021     CBC:    Lab Results   Component Value Date    WBC 9.7 08/12/2021    RBC 4.45 08/12/2021    HGB 12.8 08/12/2021    HCT 38.6 08/12/2021    MCV 86.7 08/12/2021    RDW 14.4 08/12/2021     08/12/2021     PT/INR:  No results found for: PTINR  PT/INR Warfarin:  No components found for: PTPATWAR, PTINRWAR  PTT:  No results found for: APTT  PTT Heparin:  No components found for: APTTHEP  Magnesium:    Lab Results   Component Value Date    MG 1.9 08/12/2021     TSH:    Lab Results   Component Value Date    TSH 2.950 02/19/2021     TROPONIN:  No components found for: TROP  BNP:  No results found for: BNP  FASTING LIPID PANEL:    Lab Results   Component Value Date    CHOL 193 02/19/2021    HDL 29 02/19/2021    TRIG 379 02/19/2021     No orders to display     I have personally reviewed the laboratory, cardiac diagnostic and radiographic testing as outlined above:      IMPRESSION:  1. Palpitations: Etiology?,  Will schedule for 48 hours Holter monitor for further evaluation  2. Dyspnea on exertion: Etiology?,  Will schedule for echocardiogram to rule out any structural heart disease that might explain her symptoms  3. Hypertension: Controlled  4. Type 2 diabetes mellitus  5. Hypothyroidism: On Synthroid supplements    RECOMMENDATIONS:   1.  48 hours Holter monitor  2.  Echocardiogram to rule out structural heart disease that might explain her shortness of breath  3. Patient was strongly advised to call 911 if symptoms recur or get worse for any reason  4. Follow-up with Dr. Sen Arroyo as scheduled  5. Follow-up with Dr. Priscila Alvarado after her test    I have reviewed my findings and recommendations with patient    Electronically signed by Shweta Don MD on 10/10/2021 at 12:23 PM    NOTE: This report was transcribed using voice recognition software.  Every effort was made to ensure accuracy; however, inadvertent computerized transcription errors may be present

## 2021-07-27 ENCOUNTER — TELEPHONE (OUTPATIENT)
Dept: CARDIOLOGY | Age: 61
End: 2021-07-27

## 2021-08-02 ENCOUNTER — TELEPHONE (OUTPATIENT)
Dept: CARDIOLOGY CLINIC | Age: 61
End: 2021-08-02

## 2021-08-02 NOTE — TELEPHONE ENCOUNTER
Left message for patient with holter monitor results. She is to stay on the same medication, use OTC magnesium and follow up in 3 months.

## 2021-08-04 DIAGNOSIS — R00.2 PALPITATIONS: ICD-10-CM

## 2021-08-12 ENCOUNTER — NURSE TRIAGE (OUTPATIENT)
Dept: OTHER | Facility: CLINIC | Age: 61
End: 2021-08-12

## 2021-08-12 ENCOUNTER — HOSPITAL ENCOUNTER (EMERGENCY)
Age: 61
Discharge: HOME | End: 2021-08-12
Attending: EMERGENCY MEDICINE
Payer: MEDICAID

## 2021-08-12 ENCOUNTER — TELEPHONE (OUTPATIENT)
Dept: FAMILY MEDICINE CLINIC | Age: 61
End: 2021-08-12

## 2021-08-12 VITALS
HEIGHT: 63 IN | HEART RATE: 79 BPM | WEIGHT: 280 LBS | OXYGEN SATURATION: 98 % | DIASTOLIC BLOOD PRESSURE: 77 MMHG | SYSTOLIC BLOOD PRESSURE: 155 MMHG | TEMPERATURE: 98.1 F | BODY MASS INDEX: 49.61 KG/M2 | RESPIRATION RATE: 18 BRPM

## 2021-08-12 DIAGNOSIS — Z79.4 TYPE 2 DIABETES MELLITUS WITHOUT COMPLICATION, WITH LONG-TERM CURRENT USE OF INSULIN (HCC): ICD-10-CM

## 2021-08-12 DIAGNOSIS — E11.9 TYPE 2 DIABETES MELLITUS WITHOUT COMPLICATION, WITH LONG-TERM CURRENT USE OF INSULIN (HCC): ICD-10-CM

## 2021-08-12 DIAGNOSIS — R73.9 HYPERGLYCEMIA: Primary | ICD-10-CM

## 2021-08-12 LAB
ALBUMIN SERPL-MCNC: 4.1 G/DL (ref 3.5–5.2)
ALP BLD-CCNC: 129 U/L (ref 35–104)
ALT SERPL-CCNC: 69 U/L (ref 0–32)
ANION GAP SERPL CALCULATED.3IONS-SCNC: 12 MMOL/L (ref 7–16)
AST SERPL-CCNC: 69 U/L (ref 0–31)
BACTERIA: ABNORMAL /HPF
BASOPHILS ABSOLUTE: 0.03 E9/L (ref 0–0.2)
BASOPHILS RELATIVE PERCENT: 0.3 % (ref 0–2)
BETA-HYDROXYBUTYRATE: 0.1 MMOL/L (ref 0.02–0.27)
BILIRUB SERPL-MCNC: 0.5 MG/DL (ref 0–1.2)
BILIRUBIN URINE: NEGATIVE
BLOOD, URINE: ABNORMAL
BUN BLDV-MCNC: 22 MG/DL (ref 6–23)
CALCIUM SERPL-MCNC: 9.6 MG/DL (ref 8.6–10.2)
CHLORIDE BLD-SCNC: 97 MMOL/L (ref 98–107)
CHP ED QC CHECK: YES
CHP ED QC CHECK: YES
CLARITY: CLEAR
CO2: 26 MMOL/L (ref 22–29)
COLOR: YELLOW
CREAT SERPL-MCNC: 1.3 MG/DL (ref 0.5–1)
EOSINOPHILS ABSOLUTE: 0.13 E9/L (ref 0.05–0.5)
EOSINOPHILS RELATIVE PERCENT: 1.3 % (ref 0–6)
EPITHELIAL CELLS, UA: ABNORMAL /HPF
GFR AFRICAN AMERICAN: 50
GFR NON-AFRICAN AMERICAN: 41 ML/MIN/1.73
GLUCOSE BLD-MCNC: 210 MG/DL
GLUCOSE BLD-MCNC: 326 MG/DL
GLUCOSE BLD-MCNC: 342 MG/DL (ref 74–99)
GLUCOSE URINE: >=1000 MG/DL
HCT VFR BLD CALC: 38.6 % (ref 34–48)
HEMOGLOBIN: 12.8 G/DL (ref 11.5–15.5)
IMMATURE GRANULOCYTES #: 0.06 E9/L
IMMATURE GRANULOCYTES %: 0.6 % (ref 0–5)
KETONES, URINE: NEGATIVE MG/DL
LEUKOCYTE ESTERASE, URINE: NEGATIVE
LYMPHOCYTES ABSOLUTE: 1.78 E9/L (ref 1.5–4)
LYMPHOCYTES RELATIVE PERCENT: 18.3 % (ref 20–42)
MAGNESIUM: 1.9 MG/DL (ref 1.6–2.6)
MCH RBC QN AUTO: 28.8 PG (ref 26–35)
MCHC RBC AUTO-ENTMCNC: 33.2 % (ref 32–34.5)
MCV RBC AUTO: 86.7 FL (ref 80–99.9)
METER GLUCOSE: 210 MG/DL (ref 74–99)
METER GLUCOSE: 326 MG/DL (ref 74–99)
MONOCYTES ABSOLUTE: 0.84 E9/L (ref 0.1–0.95)
MONOCYTES RELATIVE PERCENT: 8.6 % (ref 2–12)
NEUTROPHILS ABSOLUTE: 6.88 E9/L (ref 1.8–7.3)
NEUTROPHILS RELATIVE PERCENT: 70.9 % (ref 43–80)
NITRITE, URINE: NEGATIVE
PDW BLD-RTO: 14.4 FL (ref 11.5–15)
PH UA: 6 (ref 5–9)
PH VENOUS: 7.37 (ref 7.35–7.45)
PLATELET # BLD: 167 E9/L (ref 130–450)
PMV BLD AUTO: 11.4 FL (ref 7–12)
POTASSIUM SERPL-SCNC: 4.3 MMOL/L (ref 3.5–5)
PROTEIN UA: NEGATIVE MG/DL
RBC # BLD: 4.45 E12/L (ref 3.5–5.5)
RBC UA: ABNORMAL /HPF (ref 0–2)
SODIUM BLD-SCNC: 135 MMOL/L (ref 132–146)
SPECIFIC GRAVITY UA: 1.02 (ref 1–1.03)
TOTAL PROTEIN: 7.9 G/DL (ref 6.4–8.3)
UROBILINOGEN, URINE: 0.2 E.U./DL
WBC # BLD: 9.7 E9/L (ref 4.5–11.5)
WBC UA: ABNORMAL /HPF (ref 0–5)

## 2021-08-12 PROCEDURE — 82962 GLUCOSE BLOOD TEST: CPT

## 2021-08-12 PROCEDURE — 82010 KETONE BODYS QUAN: CPT

## 2021-08-12 PROCEDURE — 82800 BLOOD PH: CPT

## 2021-08-12 PROCEDURE — 80053 COMPREHEN METABOLIC PANEL: CPT

## 2021-08-12 PROCEDURE — 85025 COMPLETE CBC W/AUTO DIFF WBC: CPT

## 2021-08-12 PROCEDURE — 99284 EMERGENCY DEPT VISIT MOD MDM: CPT

## 2021-08-12 PROCEDURE — 2580000003 HC RX 258: Performed by: EMERGENCY MEDICINE

## 2021-08-12 PROCEDURE — 83735 ASSAY OF MAGNESIUM: CPT

## 2021-08-12 PROCEDURE — 81001 URINALYSIS AUTO W/SCOPE: CPT

## 2021-08-12 RX ORDER — 0.9 % SODIUM CHLORIDE 0.9 %
1000 INTRAVENOUS SOLUTION INTRAVENOUS ONCE
Status: COMPLETED | OUTPATIENT
Start: 2021-08-12 | End: 2021-08-12

## 2021-08-12 RX ORDER — TAMSULOSIN HYDROCHLORIDE 0.4 MG/1
0.4 CAPSULE ORAL DAILY
COMMUNITY
End: 2022-05-31

## 2021-08-12 RX ADMIN — SODIUM CHLORIDE 1000 ML: 9 INJECTION, SOLUTION INTRAVENOUS at 16:15

## 2021-08-12 ASSESSMENT — PAIN DESCRIPTION - PAIN TYPE: TYPE: ACUTE PAIN

## 2021-08-12 ASSESSMENT — PAIN SCALES - GENERAL: PAINLEVEL_OUTOF10: 7

## 2021-08-12 ASSESSMENT — PAIN DESCRIPTION - LOCATION: LOCATION: BACK

## 2021-08-12 ASSESSMENT — PAIN DESCRIPTION - DESCRIPTORS: DESCRIPTORS: ACHING

## 2021-08-12 NOTE — ED PROVIDER NOTES
Department of Emergency Medicine   ED  Provider Note  Admit Date/RoomTime: 8/12/2021  3:25 PM  ED Room: 02/02          History of Present Illness:  8/12/21, Time: 3:40 PM EDT  Chief Complaint   Patient presents with    Hyperglycemia     LAST BS 99 Zaida Shay is a morbidly obese 56 y.o. female presenting to the ED for evaluation of hyperglycemia with a reading of 580 earlier today. She was recently diagnosed with diabetes in March. Her A1c at that time was 13.5. Her most recent A1c was 8.5. She states that she normally runs in the 200s for her blood sugars. She called her PCP and they told her to come in the emergency department since the office was about to close. Patient currently complains of polydipsia, polyuria. She some chronic low back pain. She denies any recent sick contacts, fevers, chills, chest pain, shortness of breath. Review of Systems:   Pertinent positives and negatives are stated within HPI, all other systems reviewed and are negative.        --------------------------------------------- PAST HISTORY ---------------------------------------------  Past Medical History:  has a past medical history of History of blood transfusion, History of kidney stones, Hx of cardiovascular stress test, Hypertension, Hypothyroidism, Obesity, Thyroid disease, and Type 2 diabetes mellitus without complication ([facility] [place] [address] Past Surgical History:  has a past surgical history that includes Hysterectomy; joint replacement (Right, 2019); Kidney stone surgery; hernia repair; Cholecystectomy, laparoscopic (N/A, 5/18/2020); and Hysterectomy, vaginal.    Social History:  reports that she has never smoked. She has never used smokeless tobacco. She reports previous alcohol use. She reports that she does not use drugs. Family History: family history includes COPD in her father; [place] in her mother. . Unless otherwise noted, family history is non contributory    The patients home medications have been reviewed. Allergies: Patient has no known allergies. I have reviewed the past medical history, past surgical history, social history, and family history    ---------------------------------------------------PHYSICAL EXAM--------------------------------------    Constitutional/General: Alert and oriented x3  Head: Normocephalic and atraumatic  Eyes: PERRL, EOMI, sclera non icteric  Mouth: Oropharynx clear, dry, handling secretions, no trismus, no asymmetry of the posterior oropharynx or uvular edema  Neck: Supple, full ROM, no stridor, no meningeal signs  Respiratory: Lungs clear to auscultation bilaterally, no wheezes, rales, or rhonchi. Not in respiratory distress  Cardiovascular:  Regular rate. Regular rhythm. No murmurs, no aortic murmurs, no gallops, or rubs. 2+ distal pulses. Equal extremity pulses. Chest: No chest wall tenderness  Gastrointestinal:  Abdomen rotund, soft, Non tender, Non distended. No rebound, guarding, or rigidity. No pulsatile masses. Musculoskeletal: Moves all extremities x 4. Warm and well perfused, no clubbing, no cyanosis, no edema. Capillary refill <3 seconds  Skin: skin warm and dry. No rashes. Neurologic: GCS 15, no focal deficits, symmetric strength 5/5 in the upper and lower extremities bilaterally  Psychiatric: Normal Affect          -------------------------------------------------- RESULTS -------------------------------------------------  I have personally reviewed all laboratory and imaging results for this patient. Results are listed below.      LABS: (Lab results interpreted by me)  Results for orders placed or performed during the hospital encounter of 08/12/21   CBC Auto Differential   Result Value Ref Range    WBC 9.7 4.5 - 11.5 E9/L    RBC 4.45 3.50 - 5.50 E12/L    Hemoglobin 12.8 11.5 - 15.5 g/dL    Hematocrit 38.6 34.0 - 48.0 %    MCV 86.7 80.0 - 99.9 fL    MCH 28.8 26.0 - 35.0 pg    MCHC 33.2 32.0 - 34.5 %    RDW 14.4 11.5 - 15.0 fL Platelets 754 145 - 597 E9/L    MPV 11.4 7.0 - 12.0 fL    Neutrophils % 70.9 43.0 - 80.0 %    Immature Granulocytes % 0.6 0.0 - 5.0 %    Lymphocytes % 18.3 (L) 20.0 - 42.0 %    Monocytes % 8.6 2.0 - 12.0 %    Eosinophils % 1.3 0.0 - 6.0 %    Basophils % 0.3 0.0 - 2.0 %    Neutrophils Absolute 6.88 1.80 - 7.30 E9/L    Immature Granulocytes # 0.06 E9/L    Lymphocytes Absolute 1.78 1.50 - 4.00 E9/L    Monocytes Absolute 0.84 0.10 - 0.95 E9/L    Eosinophils Absolute 0.13 0.05 - 0.50 E9/L    Basophils Absolute 0.03 0.00 - 0.20 E9/L   Comprehensive Metabolic Panel   Result Value Ref Range    Sodium 135 132 - 146 mmol/L    Potassium 4.3 3.5 - 5.0 mmol/L    Chloride 97 (L) 98 - 107 mmol/L    CO2 26 22 - 29 mmol/L    Anion Gap 12 7 - 16 mmol/L    Glucose 342 (H) 74 - 99 mg/dL    BUN 22 6 - 23 mg/dL    CREATININE 1.3 (H) 0.5 - 1.0 mg/dL    GFR Non-African American 41 >=60 mL/min/1.73    GFR African American 50     Calcium 9.6 8.6 - 10.2 mg/dL    Total Protein 7.9 6.4 - 8.3 g/dL    Albumin 4.1 3.5 - 5.2 g/dL    Total Bilirubin 0.5 0.0 - 1.2 mg/dL    Alkaline Phosphatase 129 (H) 35 - 104 U/L    ALT 69 (H) 0 - 32 U/L    AST 69 (H) 0 - 31 U/L   Magnesium   Result Value Ref Range    Magnesium 1.9 1.6 - 2.6 mg/dL   pH, venous   Result Value Ref Range    pH, Raul 7.37 7.35 - 7.45   Beta-Hydroxybutyrate   Result Value Ref Range    Beta-Hydroxybutyrate 0.10 0.02 - 0.27 mmol/L   Urinalysis with Microscopic   Result Value Ref Range    Color, UA Yellow Straw/Yellow    Clarity, UA Clear Clear    Glucose, Ur >=1000 (A) Negative mg/dL    Bilirubin Urine Negative Negative    Ketones, Urine Negative Negative mg/dL    Specific Gravity, UA 1.020 1.005 - 1.030    Blood, Urine MODERATE (A) Negative    pH, UA 6.0 5.0 - 9.0    Protein, UA Negative Negative mg/dL    Urobilinogen, Urine 0.2 <2.0 E.U./dL    Nitrite, Urine Negative Negative    Leukocyte Esterase, Urine Negative Negative    WBC, UA NONE 0 - 5 /HPF    RBC, UA 1-3 0 - 2 /HPF    Epithelial Cells, UA FEW /HPF    Bacteria, UA NONE SEEN None Seen /HPF   POCT Glucose   Result Value Ref Range    Glucose 326 mg/dL    QC OK? yes    POCT Glucose   Result Value Ref Range    Meter Glucose 326 (H) 74 - 99 mg/dL   POCT Glucose   Result Value Ref Range    Glucose 210 mg/dL    QC OK? yes    POCT Glucose   Result Value Ref Range    Meter Glucose 210 (H) 74 - 99 mg/dL   ,       RADIOLOGY:  Interpreted by Radiologist unless otherwise specified  No orders to display       ------------------------- NURSING NOTES AND VITALS REVIEWED ---------------------------   The nursing notes within the ED encounter and vital signs as below have been reviewed by myself  BP (!) 155/77   Pulse 79   Temp 98.1 °F (36.7 °C)   Resp 18   Ht 5' 3\" (1.6 m)   Wt 280 lb (127 kg)   LMP  (LMP Unknown)   SpO2 98%   BMI 49.60 kg/m²     Oxygen Saturation Interpretation: 97% on room air. Normal    The patients available past medical records and past encounters were reviewed. ------------------------------ ED COURSE/MEDICAL DECISION MAKING----------------------  Medications   0.9 % sodium chloride bolus (0 mLs Intravenous Stopped 8/12/21 1719)           Medical Decision Making:     The patient was seen and evaluated by the Attending Emergency Medicine Physician Dr. Wilbert Edwards      Patient was evaluated for chief complaint of hypoglycemia. CBC, CMP, mag, venous pH, PHP, POC glucose, urinalysis were ordered. POC glucose was 326.  1 L bolus IV normal saline  She did have a mild transaminitis as well on CMP, however this is seem to be her baseline. After IV fluid bolus she does appear to be less dehydrated clinically  Instructed patient to follow-up with her primary care provider in the next couple weeks to readjust her diabetes medication and for further checkup. She was amenable to the plan she understood return precautions. Re-Evaluations:            This patient's ED course included: a personal history and physicial examination    This patient has remained hemodynamically stable during their ED course. Counseling: The emergency provider has spoken with the patient and discussed todays results, in addition to providing specific details for the plan of care and counseling regarding the diagnosis and prognosis. Questions are answered at this time and they are agreeable with the plan.       --------------------------------- IMPRESSION AND DISPOSITION ---------------------------------    IMPRESSION  1. Hyperglycemia        DISPOSITION  Disposition: Discharge to home  Patient condition is good        NOTE: This report was transcribed using voice recognition software.  Every effort was made to ensure accuracy; however, inadvertent computerized transcription errors may be present       Juan Agarwal DO  Resident  08/16/21 [id number]

## 2021-08-12 NOTE — TELEPHONE ENCOUNTER
----- Message from Yesenia Alford sent at 8/12/2021  9:12 AM EDT -----  Subject: Message to Provider    QUESTIONS  Information for Provider? Patient wanted to just leave Dr. Fatoumata Maldonado a   message letting her know that for the past two days her blood sugar levels   have been reading 400-500, and she would like to know what the doctor   would like her to do.  ---------------------------------------------------------------------------  --------------  [address]  What is the best way for the office to contact you? OK to leave message on   voicemail  Preferred Call Back Phone Number? [phone]  ---------------------------------------------------------------------------  --------------  SCRIPT ANSWERS  Relationship to Patient?  Self

## 2021-08-12 NOTE — TELEPHONE ENCOUNTER
Reason for Disposition   Blood glucose > 500 mg/dL (27.8 mmol/L)    Answer Assessment - Initial Assessment Questions  1. BLOOD GLUCOSE: \"What is your blood glucose level? \"       580 @ 0630 on 8/12/21 and 538 @ 0830 on 8/12/21  568 @ 1217 on 8/12    2. ONSET: \"When did you check the blood glucose? \"      364 @ 7197 on 8/11 402 @ 0558 on 8/11    3. USUAL RANGE: \"What is your glucose level usually? \" (e.g., usual fasting morning value, usual evening value)      Usual range in 130s or so and sometimes in the mid 200s    4. KETONES: \"Do you check for ketones (urine or blood test strips)? \" If yes, ask: \"What does the test show now? \"   Don't check    5. TYPE 1 or 2:  \"Do you know what type of diabetes you have? \"  (e.g., Type 1, Type 2, Gestational; doesn't know)      Type 2    6. INSULIN: \"Do you take insulin? \" \"What type of insulin(s) do you use? What is the mode of delivery? (syringe, pen; injection or pump)? \"       Lantus injections dose is 14 units q pm    7. DIABETES PILLS: \"Do you take any pills for your diabetes? \" If yes, ask: \"Have you missed taking any pills recently? \"      No PO meds    8. OTHER SYMPTOMS: \"Do you have any symptoms? \" (e.g., fever, frequent urination, difficulty breathing, dizziness, weakness, vomiting)      Frequent urination, lower back pain     9. PREGNANCY: \"Is there any chance you are pregnant? \" \"When was your last menstrual period? \"     n/a    Protocols used: DIABETES - HIGH BLOOD SUGAR-ADULT-OH    Received call from Kyle Hilliard at [facility] with [employer]. Brief description of triage: pt has elevated blood glucose levels in the 500s    Triage indicates for patient to go to the ED now    Care advice provided, patient verbalizes understanding; denies any other questions or concerns; instructed to call back for any new or worsening symptoms. Attention Provider: Thank you for allowing me to participate in the care of your patient.   The patient was connected to triage in response to information provided to the ECC. Please do not respond through this encounter as the response is not directed to a shared pool.

## 2021-08-13 DIAGNOSIS — G47.33 OSA (OBSTRUCTIVE SLEEP APNEA): Primary | ICD-10-CM

## 2021-08-13 RX ORDER — INSULIN GLARGINE 100 [IU]/ML
20 INJECTION, SOLUTION SUBCUTANEOUS NIGHTLY
Qty: 5 PEN | Refills: 3
Start: 2021-08-13 | End: 2021-09-21 | Stop reason: SDUPTHER

## 2021-08-25 ENCOUNTER — TELEPHONE (OUTPATIENT)
Dept: CARDIOLOGY | Age: 61
End: 2021-08-25

## 2021-08-26 ENCOUNTER — HOSPITAL ENCOUNTER (OUTPATIENT)
Dept: SLEEP CENTER | Age: 61
Discharge: HOME | End: 2021-08-26
Payer: MEDICAID

## 2021-08-26 DIAGNOSIS — R06.83 SNORES: ICD-10-CM

## 2021-08-26 PROCEDURE — 95810 POLYSOM 6/> YRS 4/> PARAM: CPT

## 2021-08-31 NOTE — PROGRESS NOTES
This MA attempted to reach pt. No answer. This MA left message for pt requesting return call to schedule hospital follow up visit.     Electronically signed by Amaya Estrada MA on 8/31/21 at 1:11 PM EDT

## 2021-09-01 DIAGNOSIS — N60.12 FIBROCYSTIC DISEASE OF LEFT BREAST: ICD-10-CM

## 2021-09-01 DIAGNOSIS — N60.19 FIBROCYSTIC BREAST DISEASE (FCBD), UNSPECIFIED LATERALITY: ICD-10-CM

## 2021-09-01 DIAGNOSIS — Z12.31 SCREENING MAMMOGRAM, ENCOUNTER FOR: Primary | ICD-10-CM

## 2021-09-03 ENCOUNTER — TELEPHONE (OUTPATIENT)
Dept: SLEEP MEDICINE | Age: 61
End: 2021-09-03

## 2021-09-03 DIAGNOSIS — G47.33 OSA (OBSTRUCTIVE SLEEP APNEA): Primary | ICD-10-CM

## 2021-09-06 LAB — STATUS: NORMAL

## 2021-09-06 PROCEDURE — 95810 POLYSOM 6/> YRS 4/> PARAM: CPT | Performed by: STUDENT IN AN ORGANIZED HEALTH CARE EDUCATION/TRAINING PROGRAM

## 2021-09-21 DIAGNOSIS — E11.9 TYPE 2 DIABETES MELLITUS WITHOUT COMPLICATION, WITH LONG-TERM CURRENT USE OF INSULIN (HCC): ICD-10-CM

## 2021-09-21 DIAGNOSIS — Z79.4 TYPE 2 DIABETES MELLITUS WITHOUT COMPLICATION, WITH LONG-TERM CURRENT USE OF INSULIN (HCC): ICD-10-CM

## 2021-09-21 RX ORDER — INSULIN GLARGINE 100 [IU]/ML
20 INJECTION, SOLUTION SUBCUTANEOUS NIGHTLY
Qty: 18 ML | Refills: 1 | Status: SHIPPED
Start: 2021-09-21 | End: 2021-11-05 | Stop reason: SDUPTHER

## 2021-10-07 ENCOUNTER — HOSPITAL ENCOUNTER (OUTPATIENT)
Dept: CARDIOLOGY | Age: 61
Discharge: HOME | End: 2021-10-07
Payer: MEDICAID

## 2021-10-07 DIAGNOSIS — R06.02 SHORTNESS OF BREATH: ICD-10-CM

## 2021-10-07 LAB
LV EF: 55 %
LVEF MODALITY: NORMAL

## 2021-10-07 PROCEDURE — 93306 TTE W/DOPPLER COMPLETE: CPT

## 2021-10-20 ENCOUNTER — TELEPHONE (OUTPATIENT)
Dept: CARDIOLOGY CLINIC | Age: 61
End: 2021-10-20

## 2021-11-05 ENCOUNTER — OFFICE VISIT (OUTPATIENT)
Dept: FAMILY MEDICINE CLINIC | Age: 61
End: 2021-11-05
Payer: MEDICAID

## 2021-11-05 VITALS
TEMPERATURE: 98 F | DIASTOLIC BLOOD PRESSURE: 102 MMHG | HEIGHT: 63 IN | OXYGEN SATURATION: 97 % | WEIGHT: 283 LBS | BODY MASS INDEX: 50.14 KG/M2 | SYSTOLIC BLOOD PRESSURE: 142 MMHG | HEART RATE: 61 BPM | RESPIRATION RATE: 18 BRPM

## 2021-11-05 DIAGNOSIS — Z79.4 TYPE 2 DIABETES MELLITUS WITHOUT COMPLICATION, WITH LONG-TERM CURRENT USE OF INSULIN (HCC): ICD-10-CM

## 2021-11-05 DIAGNOSIS — I10 ESSENTIAL HYPERTENSION: ICD-10-CM

## 2021-11-05 DIAGNOSIS — E11.9 TYPE 2 DIABETES MELLITUS WITHOUT COMPLICATION, WITH LONG-TERM CURRENT USE OF INSULIN (HCC): ICD-10-CM

## 2021-11-05 DIAGNOSIS — R06.02 SHORTNESS OF BREATH: ICD-10-CM

## 2021-11-05 DIAGNOSIS — F32.A DEPRESSION, UNSPECIFIED DEPRESSION TYPE: ICD-10-CM

## 2021-11-05 DIAGNOSIS — E03.9 HYPOTHYROIDISM, UNSPECIFIED TYPE: ICD-10-CM

## 2021-11-05 DIAGNOSIS — E03.9 ACQUIRED HYPOTHYROIDISM: Primary | ICD-10-CM

## 2021-11-05 LAB
CHP ED QC CHECK: ABNORMAL
GLUCOSE BLD-MCNC: 161 MG/DL
HBA1C MFR BLD: 7.6 %

## 2021-11-05 PROCEDURE — 99214 OFFICE O/P EST MOD 30 MIN: CPT | Performed by: FAMILY MEDICINE

## 2021-11-05 PROCEDURE — 83036 HEMOGLOBIN GLYCOSYLATED A1C: CPT | Performed by: FAMILY MEDICINE

## 2021-11-05 PROCEDURE — G8417 CALC BMI ABV UP PARAM F/U: HCPCS | Performed by: FAMILY MEDICINE

## 2021-11-05 PROCEDURE — 3017F COLORECTAL CA SCREEN DOC REV: CPT | Performed by: FAMILY MEDICINE

## 2021-11-05 PROCEDURE — 82962 GLUCOSE BLOOD TEST: CPT | Performed by: FAMILY MEDICINE

## 2021-11-05 PROCEDURE — G8484 FLU IMMUNIZE NO ADMIN: HCPCS | Performed by: FAMILY MEDICINE

## 2021-11-05 PROCEDURE — 1036F TOBACCO NON-USER: CPT | Performed by: FAMILY MEDICINE

## 2021-11-05 PROCEDURE — 2022F DILAT RTA XM EVC RTNOPTHY: CPT | Performed by: FAMILY MEDICINE

## 2021-11-05 PROCEDURE — G8427 DOCREV CUR MEDS BY ELIG CLIN: HCPCS | Performed by: FAMILY MEDICINE

## 2021-11-05 PROCEDURE — 3051F HG A1C>EQUAL 7.0%<8.0%: CPT | Performed by: FAMILY MEDICINE

## 2021-11-05 RX ORDER — HYDROCHLOROTHIAZIDE 25 MG/1
TABLET ORAL
Qty: 90 TABLET | Refills: 1 | Status: SHIPPED
Start: 2021-11-05 | End: 2022-02-28 | Stop reason: ALTCHOICE

## 2021-11-05 RX ORDER — ASPIRIN 81 MG/1
81 TABLET ORAL DAILY
Qty: 90 TABLET | Refills: 1 | Status: SHIPPED
Start: 2021-11-05 | End: 2022-05-31

## 2021-11-05 RX ORDER — METOPROLOL SUCCINATE 100 MG/1
TABLET, EXTENDED RELEASE ORAL
Qty: 90 TABLET | Refills: 1 | Status: SHIPPED
Start: 2021-11-05 | End: 2022-03-11 | Stop reason: SDUPTHER

## 2021-11-05 RX ORDER — BUSPIRONE HYDROCHLORIDE 5 MG/1
5 TABLET ORAL 2 TIMES DAILY PRN
Qty: 60 TABLET | Refills: 0 | Status: SHIPPED
Start: 2021-11-05 | End: 2022-01-07 | Stop reason: SDUPTHER

## 2021-11-05 RX ORDER — CITALOPRAM 20 MG/1
TABLET ORAL
Qty: 90 TABLET | Refills: 1 | Status: SHIPPED
Start: 2021-11-05 | End: 2022-03-11 | Stop reason: SDUPTHER

## 2021-11-05 RX ORDER — INSULIN GLARGINE 100 [IU]/ML
20 INJECTION, SOLUTION SUBCUTANEOUS NIGHTLY
Qty: 18 ML | Refills: 1 | Status: SHIPPED
Start: 2021-11-05 | End: 2022-03-11 | Stop reason: SDUPTHER

## 2021-11-05 RX ORDER — LISINOPRIL 40 MG/1
TABLET ORAL
Qty: 90 TABLET | Refills: 1 | Status: SHIPPED
Start: 2021-11-05 | End: 2022-03-11 | Stop reason: SDUPTHER

## 2021-11-05 RX ORDER — ALBUTEROL SULFATE 90 UG/1
2 AEROSOL, METERED RESPIRATORY (INHALATION) EVERY 6 HOURS PRN
Qty: 36 G | Refills: 1 | Status: CANCELLED | OUTPATIENT
Start: 2021-11-05

## 2021-11-05 RX ORDER — PRAVASTATIN SODIUM 20 MG
20 TABLET ORAL DAILY
Qty: 90 TABLET | Refills: 1 | Status: SHIPPED
Start: 2021-11-05 | End: 2022-04-05

## 2021-11-05 RX ORDER — LEVOTHYROXINE SODIUM 0.07 MG/1
TABLET ORAL
Qty: 90 TABLET | Refills: 1 | Status: SHIPPED
Start: 2021-11-05 | End: 2022-03-11 | Stop reason: SDUPTHER

## 2021-11-11 ASSESSMENT — ENCOUNTER SYMPTOMS
ABDOMINAL PAIN: 0
TROUBLE SWALLOWING: 0
COUGH: 0
EYE ITCHING: 0
EYES NEGATIVE: 1
COLOR CHANGE: 0
EYE PAIN: 0
SORE THROAT: 0
BLOOD IN STOOL: 0
WHEEZING: 0
FACIAL SWELLING: 0
STRIDOR: 0
CHOKING: 0
CONSTIPATION: 0
DIARRHEA: 0
SHORTNESS OF BREATH: 0
SINUS PRESSURE: 0
EYE DISCHARGE: 0
SINUS PAIN: 0
CHEST TIGHTNESS: 0
RECTAL PAIN: 0
VOICE CHANGE: 0
PHOTOPHOBIA: 0
EYE REDNESS: 0
RHINORRHEA: 0
ABDOMINAL DISTENTION: 0
ALLERGIC/IMMUNOLOGIC NEGATIVE: 1
NAUSEA: 0
VOMITING: 0
ANAL BLEEDING: 0
BACK PAIN: 1

## 2021-11-12 NOTE — PROGRESS NOTES
Carolina Dotson is a 56 y.o. female. HPI/Chief C/O:  Chief Complaint   Patient presents with    Diabetes     Pt here for diabetic check up    Medication Refill     Pharmacy confirmed, meds pended     No Known Allergies    This 56year old female presents for physical exam. Pt has hypertension, hyperlipidemia, hypothyroid, type 2 DM, and anxiety. Pt states she has not taken her BP medication for 4 days. Pt had a positive cologuard, and then had a colonoscopy. Pt states colonoscopy was negative, will get results. Pt is due for repeat pamela. Breast US in 12/2021. Pt follows with GI specialist, pulmonology, urology, and cardiology. A1C is 7. 6. pt denies SI and HI. Pt instructed to never run out of BP medication. ROS:  Review of Systems   Constitutional: Positive for fatigue. Negative for activity change, appetite change, chills, diaphoresis, fever and unexpected weight change. HENT: Negative. Negative for congestion, dental problem, drooling, ear discharge, ear pain, facial swelling, hearing loss, mouth sores, nosebleeds, postnasal drip, rhinorrhea, sinus pressure, sinus pain, sneezing, sore throat, tinnitus, trouble swallowing and voice change. Eyes: Negative. Negative for photophobia, pain, discharge, redness, itching and visual disturbance. Respiratory: Negative for cough, choking, chest tightness, shortness of breath, wheezing and stridor. Cardiovascular: Negative. Negative for chest pain, palpitations and leg swelling. Gastrointestinal: Negative for abdominal distention, abdominal pain, anal bleeding, blood in stool, constipation, diarrhea, nausea, rectal pain and vomiting. Endocrine: Negative for cold intolerance, heat intolerance, polydipsia, polyphagia and polyuria. Genitourinary: Positive for frequency and urgency. Negative for decreased urine volume, difficulty urinating, dysuria, flank pain, genital sores, hematuria, menstrual problem and pelvic pain.    Musculoskeletal: Positive for arthralgias, back pain and myalgias. Negative for gait problem, joint swelling, neck pain and neck stiffness. Skin: Negative. Negative for color change, pallor, rash and wound. Allergic/Immunologic: Negative. Neurological: Negative. Negative for dizziness, tremors, seizures, syncope, facial asymmetry, speech difficulty, weakness, light-headedness, numbness and headaches. Hematological: Negative. Negative for adenopathy. Does not bruise/bleed easily. Psychiatric/Behavioral: Positive for sleep disturbance. Negative for agitation, behavioral problems, confusion, decreased concentration, dysphoric mood, hallucinations, self-injury and suicidal ideas. The patient is nervous/anxious. The patient is not hyperactive.          Past Medical/Surgical Hx;  Reviewed with patient      Diagnosis Date    [facility] of [facility] transfusion 6256    at [facility] complication after kidney stone surgery    History of kidney stones     Hx of cardiovascular stress test 12/17/2019    Lexiscan stress test    Hypertension     Hypothyroidism     Obesity     Thyroid disease     Type 2 diabetes mellitus without complication (HCC)      Past Surgical History:   Procedure Laterality Date    CHOLECYSTECTOMY, LAPAROSCOPIC N/A 5/18/2020    LAPAROSCOPIC CHOLECYSTECTOMY WITH INTRAOPERATIVE CHOLANGIOGRAM performed by Aniyah Hair MD at [place] [address], VAGINAL      JOINT REPLACEMENT Right 2019    knee  [facility]    KIDNEY STONE SURGERY      incisional removal       Past Family Hx:  Reviewed with patient      Problem Relation Age of Onset    Lung Cancer Mother         lung    COPD Father        Social Hx:  Reviewed with patient  Social History     Tobacco Use    Smoking status: Never Smoker    Smokeless tobacco: Never Used   Substance Use Topics    Alcohol use: Not Currently     Comment: No caffeine       OBJECTIVE  BP (!) 142/102   Pulse 61   Temp 98 °F (36.7 °C) (Temporal)   Resp 18   Ht 5' 3\" (1.6 m)   Wt 283 lb (128.4 kg)   LMP  (LMP Unknown)   SpO2 97%   Breastfeeding No   BMI 50.13 kg/m²     Problem List:  Sharla does not have any pertinent problems on file. PHYS EX:  Physical Exam  Vitals and nursing note reviewed. Constitutional:       General: She is not in acute distress. Appearance: Normal appearance. She is well-developed. She is obese. She is not ill-appearing, toxic-appearing or diaphoretic. Comments: Patient has morbid obesity. Patient instructed on low calorie, healthy ADA diet. HENT:      Head: Normocephalic and atraumatic. Nose: Nose normal. No congestion or rhinorrhea. Eyes:      General: No scleral icterus. Right eye: No discharge. Left eye: No discharge. Conjunctiva/sclera: Conjunctivae normal.      Pupils: Pupils are equal, round, and reactive to light. Neck:      Thyroid: No thyromegaly. Vascular: No carotid bruit or JVD. Trachea: No tracheal deviation. Cardiovascular:      Rate and Rhythm: Normal rate and regular rhythm. Pulses: Normal pulses. Heart sounds: Normal heart sounds. No murmur heard. No friction rub. No gallop. Pulmonary:      Effort: Pulmonary effort is normal. No respiratory distress. Breath sounds: Normal breath sounds. No stridor. No wheezing, rhonchi or rales. Chest:      Chest wall: No tenderness. Abdominal:      General: Bowel sounds are normal. There is no distension. Palpations: Abdomen is soft. There is no mass. Tenderness: There is no abdominal tenderness. There is no right CVA tenderness, left CVA tenderness, guarding or rebound. Hernia: No hernia is present. Musculoskeletal:         General: Tenderness present. No swelling, deformity or signs of injury. Cervical back: Normal range of motion and neck supple. No rigidity. No muscular tenderness. Right lower leg: No edema. Left lower leg: No edema.       Comments: Pain and decreased ROM multiple joints. Lymphadenopathy:      Cervical: No cervical adenopathy. Skin:     General: Skin is warm. Coloration: Skin is not jaundiced or pale. Findings: No bruising, erythema, lesion or rash. Neurological:      General: No focal deficit present. Mental Status: She is alert and oriented to person, place, and time. Cranial Nerves: No cranial nerve deficit. Sensory: No sensory deficit. Motor: No weakness or abnormal muscle tone. Coordination: Coordination normal.      Gait: Gait normal.      Deep Tendon Reflexes: Reflexes are normal and symmetric. Reflexes normal.   Psychiatric:         Mood and Affect: Mood normal.         Behavior: Behavior normal.         Thought Content: Thought content normal.         Judgment: Judgment normal.         ASSESSMENT/PLAN  Sharla was seen today for diabetes and medication refill. Diagnoses and all orders for this visit:    Acquired hypothyroidism  Controlled. Synthroid. Type 2 diabetes mellitus without complication, with long-term current use of insulin (HCC)  -     POCT glycosylated hemoglobin (Hb A1C)  -     POCT Glucose  -     insulin glargine (LANTUS SOLOSTAR) 100 UNIT/ML injection pen; Inject 20 Units into the skin nightly  Controlled. Metformin, lantus, ace, statin, aspirin, ADA diet. Essential hypertension  -     hydroCHLOROthiazide (HYDRODIURIL) 25 MG tablet; take 1 tablet by mouth once daily  -     lisinopril (PRINIVIL;ZESTRIL) 40 MG tablet; take 1 tablet by mouth once daily  -     metoprolol succinate (TOPROL XL) 100 MG extended release tablet; take 1 tablet by mouth once daily  Not controlled. Pt instructed to never run out of BP medication.    Hypothyroidism, unspecified type  -     levothyroxine (SYNTHROID) 75 MCG tablet; take 1 tablet by mouth once daily    Depression, unspecified depression type  -     citalopram (CELEXA) 20 MG tablet; take 1 tablet by mouth once daily    Shortness of breath  Controlled. Albutero. Other orders  -     aspirin EC 81 MG EC tablet; Take 1 tablet by mouth daily  -     Insulin Pen Needle 31G X 5 MM MISC; 1 each by Does not apply route daily  -     pravastatin (PRAVACHOL) 20 MG tablet; Take 1 tablet by mouth daily  -     busPIRone (BUSPAR) 5 MG tablet; Take 1 tablet by mouth 2 times daily as needed (anxiety    depression)    Pt instructed if any worse go ED ASAP. Outpatient Encounter Medications as of 11/5/2021   Medication Sig Dispense Refill    insulin glargine (LANTUS SOLOSTAR) 100 UNIT/ML injection pen Inject 20 Units into the skin nightly 18 mL 1    aspirin EC 81 MG EC tablet Take 1 tablet by mouth daily 90 tablet 1    Insulin Pen Needle 31G X 5 MM MISC 1 each by Does not apply route daily 100 each 3    hydroCHLOROthiazide (HYDRODIURIL) 25 MG tablet take 1 tablet by mouth once daily 90 tablet 1    levothyroxine (SYNTHROID) 75 MCG tablet take 1 tablet by mouth once daily 90 tablet 1    citalopram (CELEXA) 20 MG tablet take 1 tablet by mouth once daily 90 tablet 1    lisinopril (PRINIVIL;ZESTRIL) 40 MG tablet take 1 tablet by mouth once daily 90 tablet 1    metoprolol succinate (TOPROL XL) 100 MG extended release tablet take 1 tablet by mouth once daily 90 tablet 1    pravastatin (PRAVACHOL) 20 MG tablet Take 1 tablet by mouth daily 90 tablet 1    busPIRone (BUSPAR) 5 MG tablet Take 1 tablet by mouth 2 times daily as needed (anxiety    depression) 60 tablet 0    tamsulosin (FLOMAX) 0.4 MG capsule Take 0.4 mg by mouth daily      ondansetron (ZOFRAN ODT) 8 MG TBDP disintegrating tablet Take 1 tablet by mouth every 8 hours as needed for Nausea 12 tablet 0    blood glucose monitor strips Test 3 times a day & as needed for symptoms of irregular blood glucose. Dispense sufficient amount for indicated testing frequency plus additional to accommodate PRN testing needs. ONE TOUCH ULTRA.  300 strip 1    albuterol sulfate  (90 Base) MCG/ACT inhaler Inhale 2 puffs into the lungs every 6 hours as needed for Wheezing or Shortness of Breath 1 Inhaler 3    [DISCONTINUED] insulin glargine (LANTUS SOLOSTAR) 100 UNIT/ML injection pen Inject 20 Units into the skin nightly 18 mL 1    [DISCONTINUED] aspirin EC 81 MG EC tablet Take 1 tablet by mouth daily 90 tablet 1    [DISCONTINUED] Insulin Pen Needle 31G X 5 MM MISC 1 each by Does not apply route daily 100 each 3    [DISCONTINUED] hydroCHLOROthiazide (HYDRODIURIL) 25 MG tablet take 1 tablet by mouth once daily 90 tablet 1    [DISCONTINUED] levothyroxine (SYNTHROID) 75 MCG tablet take 1 tablet by mouth once daily 90 tablet 1    [DISCONTINUED] citalopram (CELEXA) 20 MG tablet take 1 tablet by mouth once daily 90 tablet 1    [DISCONTINUED] lisinopril (PRINIVIL;ZESTRIL) 40 MG tablet take 1 tablet by mouth once daily 90 tablet 1    [DISCONTINUED] metoprolol succinate (TOPROL XL) 100 MG extended release tablet take 1 tablet by mouth once daily 90 tablet 1     No facility-administered encounter medications on file as of 11/5/2021. Return in about 3 months (around 2/5/2022).         Reviewed recent labs related to Sharla's current problems      Discussed importance of regular Health Maintenance follow up  Health Maintenance   Topic    Pneumococcal 0-64 years Vaccine (1 of 4 - PCV13)    Diabetic foot exam     COVID-19 Vaccine (1)    HIV screen     Shingles Vaccine (1 of 2)    Diabetic retinal exam     Flu vaccine (1)    Lipid screen     TSH testing     Potassium monitoring     Creatinine monitoring     A1C test (Diabetic or Prediabetic)     Breast cancer screen     DTaP/Tdap/Td vaccine (3 - Td or Tdap)    Colon cancer screen colonoscopy     Hepatitis C screen     Hepatitis A vaccine     Hib vaccine     Meningococcal (ACWY) vaccine

## 2021-12-07 ENCOUNTER — OFFICE VISIT (OUTPATIENT)
Dept: SLEEP MEDICINE | Age: 61
End: 2021-12-07
Payer: MEDICAID

## 2021-12-07 VITALS
BODY MASS INDEX: 49.93 KG/M2 | SYSTOLIC BLOOD PRESSURE: 117 MMHG | RESPIRATION RATE: 16 BRPM | DIASTOLIC BLOOD PRESSURE: 72 MMHG | OXYGEN SATURATION: 96 % | HEIGHT: 63 IN | WEIGHT: 281.8 LBS | HEART RATE: 70 BPM

## 2021-12-07 DIAGNOSIS — G47.61 PLMD (PERIODIC LIMB MOVEMENT DISORDER): ICD-10-CM

## 2021-12-07 DIAGNOSIS — R03.0 ELEVATED BLOOD PRESSURE READING: ICD-10-CM

## 2021-12-07 DIAGNOSIS — E66.9 OBESITY, UNSPECIFIED CLASSIFICATION, UNSPECIFIED OBESITY TYPE, UNSPECIFIED WHETHER SERIOUS COMORBIDITY PRESENT: ICD-10-CM

## 2021-12-07 DIAGNOSIS — G47.33 OBSTRUCTIVE SLEEP APNEA: Primary | ICD-10-CM

## 2021-12-07 PROCEDURE — G8427 DOCREV CUR MEDS BY ELIG CLIN: HCPCS | Performed by: STUDENT IN AN ORGANIZED HEALTH CARE EDUCATION/TRAINING PROGRAM

## 2021-12-07 PROCEDURE — G8484 FLU IMMUNIZE NO ADMIN: HCPCS | Performed by: STUDENT IN AN ORGANIZED HEALTH CARE EDUCATION/TRAINING PROGRAM

## 2021-12-07 PROCEDURE — 99244 OFF/OP CNSLTJ NEW/EST MOD 40: CPT | Performed by: STUDENT IN AN ORGANIZED HEALTH CARE EDUCATION/TRAINING PROGRAM

## 2021-12-07 PROCEDURE — G8417 CALC BMI ABV UP PARAM F/U: HCPCS | Performed by: STUDENT IN AN ORGANIZED HEALTH CARE EDUCATION/TRAINING PROGRAM

## 2021-12-07 ASSESSMENT — SLEEP AND FATIGUE QUESTIONNAIRES
HOW LIKELY ARE YOU TO NOD OFF OR FALL ASLEEP WHEN YOU ARE A PASSENGER IN A CAR FOR AN HOUR WITHOUT A BREAK: 0
HOW LIKELY ARE YOU TO NOD OFF OR FALL ASLEEP IN A CAR, WHILE STOPPED FOR A FEW MINUTES IN TRAFFIC: 0
HOW LIKELY ARE YOU TO NOD OFF OR FALL ASLEEP WHILE SITTING QUIETLY AFTER LUNCH WITHOUT ALCOHOL: 3
HOW LIKELY ARE YOU TO NOD OFF OR FALL ASLEEP WHILE SITTING AND READING: 1
HOW LIKELY ARE YOU TO NOD OFF OR FALL ASLEEP WHILE SITTING AND TALKING TO SOMEONE: 0
ESS TOTAL SCORE: 8
HOW LIKELY ARE YOU TO NOD OFF OR FALL ASLEEP WHILE LYING DOWN TO REST IN THE AFTERNOON WHEN CIRCUMSTANCES PERMIT: 3
HOW LIKELY ARE YOU TO NOD OFF OR FALL ASLEEP WHILE SITTING INACTIVE IN A PUBLIC PLACE: 0
HOW LIKELY ARE YOU TO NOD OFF OR FALL ASLEEP WHILE WATCHING TV: 1

## 2021-12-07 NOTE — PROGRESS NOTES
[facility] Sleep Medicine    Patient Name: Julio Borrego  Age: 56 y.o.   : 1960    Date of Visit: 21      HPI   Julio Borrego is a 56 y.o. female with  has a past medical history of History of blood transfusion (2018), History of kidney stones, cardiovascular stress test (2019), Hypertension, Hypothyroidism, Obesity, Thyroid disease, and Type 2 diabetes mellitus without complication ([facility] [address] who presents as a new patient to Sleep Clinic, referred by Dr. Jaun Salter, for Sleep Apnea     Patient presents today to establish with sleep medicine as referred by her primary care provider. Patient had a recent sleep study on 2021 which demonstrated mild sleep apnea, with a predilection for REM sleep. Its that her initial sleep study was ordered after she was advised by a surgical nurse during her cholecystectomy to proceed with screening for JOSE DANIEL. Patient is unclear of what symptom was initially detected. Her current sleep symptoms include snoring and feeling sleepy throughout the day. Sleep Study Results  Diagnostic Sleep Study:     Study date: 2021     AHI: 6.3      Oxygen Octavio: 87.0%     PLMs Index: 33.7    Bed time:   2a  Wake time:   11a  Sleep Latency (min):  30    Sleep Medications: None  Awakenings:   rare   / bathroom  / falls back asleep quickly  Estimated Sleep time (hours):  8-9  Daytime Naps: rare  Sleep disturbances: None  Sleep Location: Bed  Sleep environment: Sleeps alone  Occupation: Retired -     SLEEP HYGIENE     Activities before bed: TV/Phone  Caffeine:   0  Coffee    0   Soda    0   Tea  Alcohol: none  Tobacco: none        Sleep ROS:     Snoring: Y   Witnessed apneas: N  AM Headache: N  Dry Mouth: Y  Daytime Sleepiness: Y  Difficulty remembering things: N  MVA  or near miss accident due to sleepiness in the past? N  Tonsillectomy? N  Have you lost or gained weight recently?  Lost- 20 lbs (she was watching her diet)       PARASOMNIAS/ NARCOLEPSY:  Hypnogogic/Hynopompic Hallucinations: N   Sleep paralysis: N  Cataplexy: N   REM behavior symptoms: N  Nightmare: N   Sleep Walking: N  Sleep Talking: Y  RLS Symptoms: N           PMH:  Past Medical History:   Diagnosis Date    [facility] of [facility]04    [facility] after [facility] stone surgery    History of kidney stones     Hx of cardiovascular stress test 12/17/2019    Lexiscan stress test    Hypertension     Hypothyroidism     Obesity     Thyroid disease     Type 2 diabetes mellitus without complication (HCC)         PSH:  Past Surgical History:   Procedure Laterality Date    CHOLECYSTECTOMY, LAPAROSCOPIC N/A 5/18/2020    LAPAROSCOPIC CHOLECYSTECTOMY WITH INTRAOPERATIVE CHOLANGIOGRAM performed by Kriss Chowdhury MD at [place] [address], VAGINAL      JOINT REPLACEMENT Right 2019    knee  [facility]    KIDNEY STONE SURGERY      incisional removal          Soc Hx:  Social History     Tobacco Use    Smoking status: Never Smoker    Smokeless tobacco: Never Used   Vaping Use    Vaping Use: Never used   Substance Use Topics    Alcohol use: Not Currently     Comment: No caffeine    Drug use: No        Fam Hx:  Family History   Problem Relation Age of Onset    Lung Cancer Mother         lung    COPD Father         Current Outpatient Medications   Medication Sig Dispense Refill    insulin glargine (LANTUS SOLOSTAR) 100 UNIT/ML injection pen Inject 20 Units into the skin nightly 18 mL 1    aspirin EC 81 MG EC tablet Take 1 tablet by mouth daily 90 tablet 1    Insulin Pen Needle 31G X 5 MM MISC 1 each by Does not apply route daily 100 each 3    hydroCHLOROthiazide (HYDRODIURIL) 25 MG tablet take 1 tablet by mouth once daily 90 tablet 1    levothyroxine (SYNTHROID) 75 MCG tablet take 1 tablet by mouth once daily 90 tablet 1    citalopram (CELEXA) 20 MG tablet take 1 tablet by mouth once daily 90 tablet 1    lisinopril (PRINIVIL;ZESTRIL) 40 MG tablet take 1 tablet by mouth once daily 90 tablet 1    metoprolol succinate (TOPROL XL) 100 MG extended release tablet take 1 tablet by mouth once daily 90 tablet 1    pravastatin (PRAVACHOL) 20 MG tablet Take 1 tablet by mouth daily 90 tablet 1    tamsulosin (FLOMAX) 0.4 MG capsule Take 0.4 mg by mouth daily      ondansetron (ZOFRAN ODT) 8 MG TBDP disintegrating tablet Take 1 tablet by mouth every 8 hours as needed for Nausea 12 tablet 0    blood glucose monitor strips Test 3 times a day & as needed for symptoms of irregular blood glucose. Dispense sufficient amount for indicated testing frequency plus additional to accommodate PRN testing needs. ONE TOUCH ULTRA. 300 strip 1    albuterol sulfate  (90 Base) MCG/ACT inhaler Inhale 2 puffs into the lungs every 6 hours as needed for Wheezing or Shortness of Breath 1 Inhaler 3     No current facility-administered medications for this visit. Review of Systems  Constitutional: no chills, no fever and no night sweats. Eyes: no blurred vision and no eyesight problems. ENT: no hearing loss, no nasal congestion, no nasal discharge, no hoarseness and no sore throat. Cardiovascular: no chest pain, no lower extremity edema. Respiratory: no cough, no shortness of breath at rest and no wheezing. Gastrointestinal: no abdominal pain, no nausea and no vomiting. Musculoskeletal: no arthralgias, no back pain and no myalgias. Integumentary: no rashes. Neurological: no difficulty walking, no diplopia, no dizziness, no dysarthria, no facial weakness, no headache, no limb weakness, no memory changes, no numbness, no speech difficulties and no tingling. Endocrine: no changes in appetite, no feelings of weakness and no recent abnormal weight gain/loss.    Hematologic/Lymphatic: no tendency for easy bruising or bleeding      Objective:   Physical Exam  BP (!) 156/84 (Site: Left Upper Arm, Position: Sitting, Cuff Size: Large Adult) Pulse 70   Resp 16   Ht 5' 3\" (1.6 m)   Wt 281 lb 12.8 oz (127.8 kg)   LMP  (LMP Unknown)   SpO2 96%   BMI 49.92 kg/m²             General: No acute distress. BMI Body mass index is 49.92 kg/m². HEENT: Normocephalic, atraumatic. No oropharyngeal lesions. Additional Measurements    12/07/21 1004   Neck circumference: 20        Mallampati class- 3  Tonsils- 1     Neck: Trachea midline  Lungs: Clear to auscultation bilaterally. No wheezes or crackles  Cardiac: Regular rate and rhythm. No murmurs appreciated. Neurologic: Normal gait. Balance intact. Psychiatric: Alert and oriented. Appropriate affect. Extremities: No clubbing or no peripheral edema  Skin: No lesions or rashes  Hematologic/lymphatic/immunologic: No bruises or bleeding    Sleep Medicine 12/7/2021   Sitting and reading 1   Watching TV 1   Sitting, inactive in a public place (e.g. a theatre or a meeting) 0   As a passenger in a car for an hour without a break 0   Lying down to rest in the afternoon when circumstances permit 3   Sitting and talking to someone 0   Sitting quietly after a lunch without alcohol 3   In a car, while stopped for a few minutes in traffic 0   Total score 8   Neck circumference 20       PROCEDURE HISTORY      Diagnostic Sleep Study:     Study date: 8/26/2021     AHI: 6.3      Oxygen Octavio: 87.0%     PLMs Index: 33.7    PERTINENT LAB RESULTS  Ferritin   Date Value Ref Range Status   03/22/2021 360 ng/mL Final     Comment:     FERRITIN Reference Ranges:  Adult Males   20 - 60 years:    30 - 400 ng/mL  Adult females 16 - 60 years:    13 - 150 ng/mL  Adults greater than 60 years:   no established reference range  Pediatrics:                     no established reference range            Assessment:      Sharla was seen today for sleep apnea.     Diagnoses and all orders for this visit:    Obstructive sleep apnea  -     DME Order for CPAP as OP    Obesity, unspecified classification, unspecified obesity type, unspecified

## 2021-12-07 NOTE — PATIENT INSTRUCTIONS
---------------------------------------------------  Blood Pressure Precautions    Your blood pressure Today:   BP Readings from Last 1 Encounters:   12/07/21 (!) 156/84       Your blood pressure at your last 3 visits:   BP Readings from Last 3 Encounters:   12/07/21 (!) 156/84   11/05/21 (!) 142/102   08/12/21 (!) 155/77        Your blood pressure was high today. Please discuss this with your primary care doctor as soon as possible. Elevated BP needs to be addressed. Please go to the Emergency room if you have any discomfort, new or worsening symptoms, or if any of the below symptoms present:       · Symptoms of a heart attack. These may include:  ? Chest pain or pressure, or a strange feeling in the chest.  ? Sweating. ? Shortness of breath. ? Nausea or vomiting. ? Pain, pressure, or a strange feeling in the back, neck, jaw, or upper belly or in one or both shoulders or arms. ? Lightheadedness or sudden weakness. ? A fast or irregular heartbeat. ? Dizziness   · Symptoms of a stroke. These may include:  ? Sudden numbness, tingling, weakness, or loss of movement in your face, arm, or leg, especially on only one side of your body. ? Sudden vision changes. ? Sudden trouble speaking. ? Facial droop  ? Sudden confusion or trouble understanding simple statements. ? Sudden problems with walking or balance. ? A sudden, severe headache that is different from past headaches. · You have severe back or belly pain.     ---------------------------------------------------    It was a pleasure seeing you today Sharla Mir! Summary of Today's Visit          1. Start APAP 4-15 cwp  There are several supply companies in the area, please let us know which company you would like to use for your CPAP and supplies. New PAP Therapy instructions to be compliant with most insurance coverage:    - Use PAP device for atleast 4 hours nightly.     - PAP therapy must be used for 70% of nights (5/7 nights per week)    - Patient must follow up in the clinic within 30-90 days from getting the PAP device. 2. Contact your DME company about supplies or issues with your machine. As a general guideline, please replace your:  -CPAP mask every 3-6 months  -CPAP hose  every 3-6 months  -Filter every 2-4 weeks  -CPAP/BiPAP/ASV replacements every 5-7+ years     3. Continue healthy weight loss/stabilization, as this is recommended as a long-term intervention. 4. Please do not drive or operate machinery when you feel fatigued/sleepy/drowsy      5. Please try to sleep between 6-8 hours nightly with the CPAP mask    6. Please avoid sedatives, alcohol and caffeinated drinks at/near bed time. 7. Please call your supply (DME) company with any issues with your PAP device. Please call our office with any issues pertaining to the therapy.   ----Please note that complications of JOSE DANIEL if not treated can increase risk for: systemic hypertension, pulmonary hypertension, cardiovascular morbidities (heart attack and stroke), car accidents and all cause mortality. For general questions or scheduling issues, call my nurse at [phone] option [address] [place], [place] [phone] option 2  f- [phone]        ----------------------------------------------------------    Please note that complications of JOSE DANIEL if not treated can increase risk for: systemic hypertension , pulmonary hypertension (high blood pressure in the lungs), cardiovascular morbidities (heart attack and stroke), car accidents and all cause mortality (increased risk of death).    ----------------------------------------------------------  Common Issues and Solutions     Pillow is dislodging mask - Consider getting a CPAP pillow or switching to a mask with the hose at the top. Dry Mouth - Adjust Humidity and/or try Biotene Gel.   (Excessive leak can also cause this)     Leak - Please call your equipment provider  as they may need to adjust your mask. Difficulty tolerating the PAP mask - Contact your equipment company to try a different mask, we can order a \"mini sleep study\"with the sleep tech to try different masks/settings of pressure, also we have a sleep psychologist to help with anxiety related to wearing the PAP mask      ----------------------------------------------------------     For Questions and Concerns: In case of difficulty with your PAP device or mask interface, please FIRST contact your 2 [address] (The company who provides you the CPAP/BiPAP/ASV machine/supplies). If you need the number for any other DME company, please call my Nurse at [phone] option 2     For questions concerning your [place] appointment: Call [phone] option 2  SLEEP LAB SCHEDULING:        ----------------------------------------------------------     Helpful Web Sites: For patients with ALL SLEEP DISORDERS: American Academy of Sleep Medicine http://sleepeducation. org; or PredictAd: https://sleepfoundation. org  For patients with JOSE DANIEL: American Sleep Apnea Association: http://mckeon.org/. org  For patients with RLS: RLS Foundation: Konoz  For patients with INSOMNIA: https://www.covington.org/. org/articles/sleep/insomnia-causes-and-cures. htm  For patients with DEPRESSION: BlackbookHR.com.Homeforswap. Appsperse/depression            Learning About CPAP for Sleep Apnea  What is CPAP? CPAP/Bi-PAP is a small machine that you use at home every night while you sleep. It increases air pressure in your throat to keep your airway open. When you have sleep apnea, this can help you sleep better so you feel much better. CPAP stands for \"continuous positive airway pressure. \" Bi-PAP is a different PAP setting that allows for different pressures when you breathe in and out.    The CPAP/Bi-PAP machine will have one of the following:  · A mask that covers your nose and mouth  · Prongs that fit into information. The general categories for the treatment of Sleep Apnea (including benefits and potential side effects) include:     1. Supportive Care  -Elevate the head of the bed   -Avoid sleeping on your back      2. Weight loss  -Start a healthy weight loss program  -Consider a referral to Weight Loss Medicine Clinic  -Benefits: Multiple health benefits  -Side Effects: Depends on treatment type     3. Oral Appliance \"Mouth Piece\"  (Mandibular Advancement Device)  -Benefits: CPAP alternative. Decreased snoring, improved sleep quality, decreased sleepiness.  -Side Effects: teeth crowding, oral/dental pain. Regular visits with dentist are advised to watch for side effects. 4. Positive pressure therapy with a machine and a mask (CPAP or BiPAP)  Benefits: Decreased snoring, improved sleep quality, decreased sleepiness. Risks: Aerophagia (swallowing air), dry nose/nose bleeds, dry eyes (due to leak), skin sores/rash (due to mask)     5. Different kinds of surgeries, including nasal surgery, surgery of the throat/windpipe, and nerve stimulation therapy (INSPIRE). Benefits: CPAP alternative. Decreased snoring, improved sleep quality, decreased sleepiness.   Risks: Bleeding, infection, nerve damage

## 2021-12-09 ENCOUNTER — TELEPHONE (OUTPATIENT)
Dept: BARIATRICS/WEIGHT MGMT | Age: 61
End: 2021-12-09

## 2021-12-10 ENCOUNTER — OFFICE VISIT (OUTPATIENT)
Dept: PULMONOLOGY | Age: 61
End: 2021-12-10
Payer: MEDICAID

## 2021-12-10 VITALS
HEART RATE: 64 BPM | HEIGHT: 63 IN | DIASTOLIC BLOOD PRESSURE: 76 MMHG | SYSTOLIC BLOOD PRESSURE: 135 MMHG | BODY MASS INDEX: 49.79 KG/M2 | RESPIRATION RATE: 20 BRPM | WEIGHT: 281 LBS | OXYGEN SATURATION: 96 % | TEMPERATURE: 94.6 F

## 2021-12-10 DIAGNOSIS — R06.02 SOB (SHORTNESS OF BREATH): Primary | ICD-10-CM

## 2021-12-10 PROCEDURE — 1036F TOBACCO NON-USER: CPT | Performed by: INTERNAL MEDICINE

## 2021-12-10 PROCEDURE — 99214 OFFICE O/P EST MOD 30 MIN: CPT | Performed by: INTERNAL MEDICINE

## 2021-12-10 PROCEDURE — G8417 CALC BMI ABV UP PARAM F/U: HCPCS | Performed by: INTERNAL MEDICINE

## 2021-12-10 PROCEDURE — 3017F COLORECTAL CA SCREEN DOC REV: CPT | Performed by: INTERNAL MEDICINE

## 2021-12-10 PROCEDURE — G8427 DOCREV CUR MEDS BY ELIG CLIN: HCPCS | Performed by: INTERNAL MEDICINE

## 2021-12-10 PROCEDURE — 99203 OFFICE O/P NEW LOW 30 MIN: CPT | Performed by: INTERNAL MEDICINE

## 2021-12-10 PROCEDURE — G8484 FLU IMMUNIZE NO ADMIN: HCPCS | Performed by: INTERNAL MEDICINE

## 2021-12-10 RX ORDER — ALBUTEROL SULFATE 90 UG/1
2 AEROSOL, METERED RESPIRATORY (INHALATION) EVERY 4 HOURS PRN
Qty: 18 G | Refills: 6 | Status: SHIPPED
Start: 2021-12-10 | End: 2022-03-11

## 2021-12-10 RX ORDER — BUDESONIDE AND FORMOTEROL FUMARATE DIHYDRATE 160; 4.5 UG/1; UG/1
2 AEROSOL RESPIRATORY (INHALATION) 2 TIMES DAILY
Qty: 1 EACH | Refills: 2 | Status: SHIPPED
Start: 2021-12-10 | End: 2022-03-11

## 2021-12-10 NOTE — PROGRESS NOTES
Pulmonary [facility]                             Pulmonary Consult/Progress Note :          Patient: Teresa Beaulieu  MRN: [id number]  : 1960          Reason for Consultation:SOB ,Hypoxia   CC : SOB   HPI:   Teresa Beaulieu is a 56y.o. year old who never smoke in the past and no second hand smoking who has been having Progressive SOB for the last 2 year,got worse in the last 2 years and got worse in the last 6 months   She start to have MICHAEL ,reven 48 feets will make her SOB   No history of DVT or PE    Was seen by cardiology as she state and was told she is Ok ,2 d echo shows stage II diastolic      No history of asthma ,No recent weight gain ,No industrial exposure No mold      PAST MEDICAL HISTORY:   Past Medical History:   Diagnosis Date    History of blood transfusion 3033    at [facility] complication after kidney stone surgery    History of kidney stones     Hx of cardiovascular stress test 2019    Lexiscan stress test    Hypertension     Hypothyroidism     Obesity     Thyroid disease     Type 2 diabetes mellitus without complication ([facility] [address]        PAST SURGICAL HISTORY:   Past Surgical History:   Procedure Laterality Date    CHOLECYSTECTOMY, LAPAROSCOPIC N/A 2020    LAPAROSCOPIC CHOLECYSTECTOMY WITH INTRAOPERATIVE CHOLANGIOGRAM performed by King Galina MD at [facility]    HYSTERECTOMY      HYSTERECTOMY, VAGINAL      JOINT REPLACEMENT Right 2019    k[facility]    KIDNEY STONE SURGERY      incisional removal       FAMILY HISTORY:   Family History   Problem Relation Age of Onset    Lung Cancer Mother         lung    COPD Father        SOCIAL HISTORY:   Social History     Socioeconomic History    Marital status:      Spouse name: Not on file    Number of children: Not on file    Years of education: Not on file    Highest education level: Not on file   Occupational History    Not on file   Tobacco Use    Smoking status: Never Smoker    Smokeless tobacco: Never Used   Vaping Use    Vaping Use: Never used   Substance and Sexual Activity    Alcohol use: Not Currently     Comment: No caffeine    Drug use: No    Sexual activity: Not on file   Other Topics Concern    Not on file   Social History Narrative    Not on file     Social Determinants of Health     Financial Resource Strain: Low Risk     Difficulty of Paying Living Expenses: Not hard at all   Food Insecurity: No Food Insecurity    Worried About Running Out of Food in the Last Year: Never true    Wojciech of Food in the Last Year: Never true   Transportation Needs: No Transportation Needs    Lack of Transportation (Medical): No    Lack of Transportation (Non-Medical):  No   Physical Activity:     Days of Exercise per Week: Not on file    Minutes of Exercise per Session: Not on file   Stress:     Feeling of Stress : Not on file   Social Connections:     Frequency of Communication with Friends and Family: Not on file    Frequency of Social Gatherings with Friends and Family: Not on file    Attends [employer] Services: Not on file    Active Member of [address] [place] [address] or Organizations: Not on file    Attends Club or Organization Meetings: Not on file    Marital Status: Not on file   Intimate Partner Violence:     Fear of Current or Ex-Partner: Not on file    Emotionally Abused: Not on file    Physically Abused: Not on file    Sexually Abused: Not on file   Housing Stability:     Unable to Pay for Housing in the Last Year: Not on file    Number of [place] in the Last Year: Not on file    Unstable Housing in the Last Year: Not on file     Social History     Tobacco Use   Smoking Status Never Smoker   Smokeless Tobacco Never Used     Social History     Substance and Sexual Activity   Alcohol Use Not Currently    Comment: No caffeine     Social History     Substance and Sexual Activity   Drug Use No           HOME MEDICATIONS:  Prior to Admission medications    Medication Sig Start Date End Date Taking? Authorizing Provider   insulin glargine (LANTUS SOLOSTAR) 100 UNIT/ML injection pen Inject 20 Units into the skin nightly 11/5/21 5/4/22  Carlos Mcleodria Catterlin, DO   aspirin EC 81 MG EC tablet Take 1 tablet by mouth daily 11/5/21   Idelia Bora P Catterlin, DO   Insulin Pen Needle 31G X 5 MM MISC 1 each by Does not apply route daily 11/5/21   Idelia Bora P Catterlin, DO   hydroCHLOROthiazide (HYDRODIURIL) 25 MG tablet take 1 tablet by mouth once daily 11/5/21   Gonzalez Luria Catterlin, DO   levothyroxine (SYNTHROID) 75 MCG tablet take 1 tablet by mouth once daily 11/5/21   Idelia Bora P Catterlin, DO   citalopram (CELEXA) 20 MG tablet take 1 tablet by mouth once daily 11/5/21   Idelia Bora P Catterlin, DO   lisinopril (PRINIVIL;ZESTRIL) 40 MG tablet take 1 tablet by mouth once daily 11/5/21   Idelia Bora P Catterlin, DO   metoprolol succinate (TOPROL XL) 100 MG extended release tablet take 1 tablet by mouth once daily 11/5/21   Esperanza P Catterlin, DO   pravastatin (PRAVACHOL) 20 MG tablet Take 1 tablet by mouth daily 11/5/21   Idelia Bora P Catterlin, DO   tamsulosin (FLOMAX) 0.4 MG capsule Take 0.4 mg by mouth daily    Historical Provider, MD   ondansetron (ZOFRAN ODT) 8 MG TBDP disintegrating tablet Take 1 tablet by mouth every 8 hours as needed for Nausea 6/11/21   Malachy Postal, DO   blood glucose monitor strips Test 3 times a day & as needed for symptoms of irregular blood glucose. Dispense sufficient amount for indicated testing frequency plus additional to accommodate PRN testing needs. ONE TOUCH ULTRA. 4/8/21   Esperanza P Catterlin, DO   albuterol sulfate  (90 Base) MCG/ACT inhaler Inhale 2 puffs into the lungs every 6 hours as needed for Wheezing or Shortness of Breath 1/27/21   Gonzalez Luria Catterlin, DO       CURRENT MEDICATIONS:  No current facility-administered medications for this visit.     IV MEDICATIONS:      ALLERGIES:  No Known Allergies    REVIEW OF SYSTEMS:  General ROS:  No weight loss ,no fatigue     ENT ROS:   No Sore throat ,no lymphoadenopathy,no nasal stuffiness     Hematological and Lymphatic ROS:   No ecchymosis ,no tendency to bleed  Respiratory ROS:   SOB  Cardiovascular ROS:   No CP,No Palpitation   Gastrointestinal ROS:   No Gi bleed,no nausea or vomiting      - Musculoskeletal ROS:      - no joint swelling ,no joint pain   Neurological ROS:     -no weakness or numbness    Dermatological ROS:   No skin rash ,no urticaria     PHYSICAL EXAMINATION:     VITAL SIGNS:  /76   Pulse 64   Temp 94.6 °F (34.8 °C)   Resp 20   Ht 5' 3\" (1.6 m)   Wt 281 lb (127.5 kg)   LMP  (LMP Unknown)   SpO2 96%   BMI 49.78 kg/m²   Wt Readings from Last 3 Encounters:   12/10/21 281 lb (127.5 kg)   12/07/21 281 lb 12.8 oz (127.8 kg)   11/05/21 283 lb (128.4 kg)     Temp Readings from Last 3 Encounters:   12/10/21 94.6 °F (34.8 °C)   11/05/21 98 °F (36.7 °C) (Temporal)   08/12/21 98.1 °F (36.7 °C)     TMAX:  BP Readings from Last 3 Encounters:   12/10/21 135/76   12/07/21 117/72   11/05/21 (!) 142/102     Pulse Readings from Last 3 Encounters:   12/10/21 64   12/07/21 70   11/05/21 61           INTAKE/OUTPUTS:  No intake/output data recorded.   No intake or output data in the 24 hours ending 12/10/21 1212    General Appearance: alert and oriented to person, place and time, well-developed and   well-nourished, in no acute distress   Eyes: pupils equal, round, and reactive to light, extraocular eye movements intact, conjunctivae normal and sclera anicteric   Neck: neck supple and non tender without mass, no thyromegaly, no thyroid nodules and no cervical adenopathy   Pulmonary/Chest:scattered rhonchi  Cardiovascular: normal rate, regular rhythm, normal S1 and S2, no murmurs, rubs, clicks or gallops, distal pulses intact, no carotid bruits, no murmurs, no gallops, no carotid bruits and no JVD   Abdomen: obese, soft, non-tender, non-distended, normal bowel sounds, no masses or organomegaly   Extremities:*mild edema  Musculoskeletal: normal range of motion, no joint swelling, deformity or tenderness   Neurologic: reflexes normal and symmetric, no cranial nerve deficit noted    LABS/IMAGING:    CBC:  Lab Results   Component Value Date    WBC 9.7 08/12/2021    HGB 12.8 08/12/2021    HCT 38.6 08/12/2021    MCV 86.7 08/12/2021     08/12/2021    LYMPHOPCT 18.3 (L) 08/12/2021    RBC 4.45 08/12/2021    MCH 28.8 08/12/2021    MCHC 33.2 08/12/2021    RDW 14.4 08/12/2021    NEUTOPHILPCT 70.9 08/12/2021    MONOPCT 8.6 08/12/2021    BASOPCT 0.3 08/12/2021    NEUTROABS 6.88 08/12/2021    LYMPHSABS 1.78 08/12/2021    MONOSABS 0.84 08/12/2021    EOSABS 0.13 08/12/2021    BASOSABS 0.03 08/12/2021       No results for input(s): WBC, HGB, HCT, MCV, PLT in the last 720 hours. BMP:   No results for input(s): NA, K, CL, CO2, PHOS, BUN, CREATININE in the last 72 hours.     Invalid input(s): CA    MG:   Lab Results   Component Value Date    MG 1.9 08/12/2021     Ca/Phos:   Lab Results   Component Value Date    CALCIUM 9.6 08/12/2021    PHOS 4.5 12/06/2019     Amylase: No results found for: AMYLASE  Lipase:         MICROBIOLOGY:        PROBLEM LIST:  Patient Active Problem List   Diagnosis    Hypertension    Depressed    Mixed hyperlipidemia    Fatigue    Chronic pain of both knees    Vitamin D deficiency    Elevated hemoglobin A1c    Elevated liver enzymes    Osteoarthritis    Acquired hypothyroidism    Morbid obesity (HCC)    Palpitations    Arthralgia    Dyspnea on exertion    Type 2 diabetes mellitus without complication, with long-term current use of insulin (HCC)    Positive colorectal cancer screening using Cologuard test    Stage 2 chronic kidney disease    Bilateral breast cysts    Shortness of breath    Chronic kidney disease, stage 5 (HCC)    History of cholecystectomy    Fatty liver    Flank pain    Snores    Stage 3b chronic kidney disease (HCC)    Hydronephrosis  Recurrent kidney stones    JOSE DANIEL (obstructive sleep apnea)               ASSESSMENT:    1.) SOB  2.)Possible JOSE DANIEL/OHS  3.)BMI 50  4. )HFpEF with diastolic dysfunction  5.)Mild Hypoxia       PLAN:  *-patient SOB and hypoxia seems multidactorial  *-ambulated in office ,sat drop 91 after around 5-6 m  *-will do the following work up   *-HRCT  *_full PFT  *eosinophilic count   *_Symbicort   Albuterol   Continue diuresis   May Need RHC,will decide after after work up above   Will need sleep study down the road  KATY . sed rate   D dimer   Will see in 2 months      Thank you very much for allowing me to participate in the care of this pleasant patient , should you have any questions ,please do not hesitate to contact me      Martin Benitez MD,[facility]  Pulmonary&Critical Care Medicine   Director of [address] [place] [address] Director of [address] [place] [address]    Chin Barrios    NOTE: This report was transcribed using voice recognition software. Every effort was made to ensure accuracy; however, inadvertent computerized transcription errors may be present.

## 2021-12-10 NOTE — PROGRESS NOTES
New pt to see Dr. Medina Found today for SOB. Pt has been diagnosed with JOSE DANIEL and has seen Sleep Medicine recently and on Auto CPAP 4-15. Discussed breathing issues at length with Dr. Henok Farley and complete work up including lab work, HRCT and PFT testing to be done over the next few months and them follow up with Dr. Henok Farley by in person office visit in 2-3 mos. Pt given script for lab work and advised office to arrange for testing and CT scan. Office to mail out appt card and appt letter for testing. Pt also given albuterol inhaler for prn use.   Symbicort inhaler 2 puffs daily and pt verbalized understanding of instructions, script for lab work given to pt and directed to lab in [facility] hospital.

## 2021-12-21 ENCOUNTER — TELEPHONE (OUTPATIENT)
Dept: BREAST CENTER | Age: 61
End: 2021-12-21

## 2021-12-21 ENCOUNTER — HOSPITAL ENCOUNTER (OUTPATIENT)
Dept: GENERAL RADIOLOGY | Age: 61
Discharge: HOME | End: 2021-12-23
Payer: MEDICAID

## 2021-12-21 DIAGNOSIS — N63.20 MASSES OF BOTH BREASTS: ICD-10-CM

## 2021-12-21 DIAGNOSIS — Z09 FOLLOW-UP EXAM, 3-6 MONTHS SINCE PREVIOUS EXAM: ICD-10-CM

## 2021-12-21 DIAGNOSIS — N63.10 BREAST MASS, RIGHT: ICD-10-CM

## 2021-12-21 DIAGNOSIS — N63.10 MASSES OF BOTH BREASTS: ICD-10-CM

## 2021-12-21 DIAGNOSIS — N63.20 LEFT BREAST MASS: ICD-10-CM

## 2021-12-21 PROCEDURE — 76642 ULTRASOUND BREAST LIMITED: CPT

## 2021-12-21 NOTE — TELEPHONE ENCOUNTER
Patient arrived today for her bilateral ultrasounds and office visit. I apologized that we will need to reschedule her office visit and informed her that we did attempt to call her a few days ago in reference to this change. I reassured her that the ultrasounds today can still be done today and are very important so we will follow up once we have those results to see what Mary Nicolas NP would like to do as far as a clinical follow up.

## 2021-12-23 NOTE — TELEPHONE ENCOUNTER
Prescription sent.

## 2021-12-28 NOTE — TELEPHONE ENCOUNTER
Left message with call back number to schedule a 6 month follow up office visit with a bilateral mammogram and bilateral ultrasounds prior.  Discussed with Boris Cramer NP.

## 2022-01-04 ENCOUNTER — TELEPHONE (OUTPATIENT)
Dept: PULMONOLOGY | Age: 62
End: 2022-01-04

## 2022-01-04 NOTE — TELEPHONE ENCOUNTER
Mailed a letter to patient informing her that her CT Chest is scheduled for 2-1-22 at 8:00 am at [address] [facility],[address].  She must arrive by 7:30 am. There is no prep for this test.    Her PFT is scheduled for 2-1-22 at 9:30 am. She is to have no caffeine for 24 hours prior to test and no rep meds for at least 4 hours prior to test

## 2022-01-06 DIAGNOSIS — F32.A DEPRESSION, UNSPECIFIED DEPRESSION TYPE: ICD-10-CM

## 2022-01-06 DIAGNOSIS — E03.9 HYPOTHYROIDISM, UNSPECIFIED TYPE: ICD-10-CM

## 2022-01-06 DIAGNOSIS — I10 ESSENTIAL HYPERTENSION: ICD-10-CM

## 2022-01-07 RX ORDER — METOPROLOL SUCCINATE 100 MG/1
TABLET, EXTENDED RELEASE ORAL
Qty: 90 TABLET | Refills: 1 | OUTPATIENT
Start: 2022-01-07

## 2022-01-07 RX ORDER — HYDROCHLOROTHIAZIDE 25 MG/1
TABLET ORAL
Qty: 90 TABLET | Refills: 1 | OUTPATIENT
Start: 2022-01-07

## 2022-01-07 RX ORDER — LEVOTHYROXINE SODIUM 0.07 MG/1
TABLET ORAL
Qty: 90 TABLET | Refills: 1 | OUTPATIENT
Start: 2022-01-07

## 2022-01-07 RX ORDER — CITALOPRAM 20 MG/1
TABLET ORAL
Qty: 90 TABLET | Refills: 1 | OUTPATIENT
Start: 2022-01-07

## 2022-01-07 RX ORDER — LISINOPRIL 40 MG/1
TABLET ORAL
Qty: 90 TABLET | Refills: 1 | OUTPATIENT
Start: 2022-01-07

## 2022-01-28 ENCOUNTER — HOSPITAL ENCOUNTER (OUTPATIENT)
Age: 62
Discharge: HOME | End: 2022-01-28
Payer: MEDICAID

## 2022-01-28 DIAGNOSIS — R06.02 SOB (SHORTNESS OF BREATH): ICD-10-CM

## 2022-01-28 LAB
D DIMER: 607 NG/ML DDU
SEDIMENTATION RATE, ERYTHROCYTE: 39 MM/HR (ref 0–20)

## 2022-01-28 PROCEDURE — 85378 FIBRIN DEGRADE SEMIQUANT: CPT

## 2022-01-28 PROCEDURE — 85651 RBC SED RATE NONAUTOMATED: CPT

## 2022-01-28 PROCEDURE — 36415 COLL VENOUS BLD VENIPUNCTURE: CPT

## 2022-01-28 PROCEDURE — 86038 ANTINUCLEAR ANTIBODIES: CPT

## 2022-01-29 ENCOUNTER — HOSPITAL ENCOUNTER (OUTPATIENT)
Age: 62
Discharge: HOME | End: 2022-01-31
Payer: MEDICAID

## 2022-01-29 DIAGNOSIS — Z53.8 PROCEDURE/TEST/EXAM NOT INDICATED: ICD-10-CM

## 2022-01-29 PROCEDURE — U0003 INFECTIOUS AGENT DETECTION BY NUCLEIC ACID (DNA OR RNA); SEVERE ACUTE RESPIRATORY SYNDROME CORONAVIRUS 2 (SARS-COV-2) (CORONAVIRUS DISEASE [COVID-19]), AMPLIFIED PROBE TECHNIQUE, MAKING USE OF HIGH THROUGHPUT TECHNOLOGIES AS DESCRIBED BY CMS-2020-01-R: HCPCS

## 2022-01-29 PROCEDURE — U0005 INFEC AGEN DETEC AMPLI PROBE: HCPCS

## 2022-01-30 LAB — SARS-COV-2, PCR: NOT DETECTED

## 2022-01-31 ENCOUNTER — TELEPHONE (OUTPATIENT)
Dept: PULMONOLOGY | Age: 62
End: 2022-01-31

## 2022-01-31 DIAGNOSIS — R06.02 SHORTNESS OF BREATH: Primary | ICD-10-CM

## 2022-01-31 LAB — ANTI-NUCLEAR ANTIBODY (ANA): NEGATIVE

## 2022-01-31 NOTE — TELEPHONE ENCOUNTER
Call to advise pt that Dr. Jorge Presley has changed Chest CT to CTA tomorrow and time has been changed. Advised pt that CT originally scheduled at 8am has been cancelled. PFT is scheduled as before at 930am followed by 11am CTA. VM left for pt to call RN back to confirm she got this message.

## 2022-02-01 ENCOUNTER — APPOINTMENT (OUTPATIENT)
Dept: CT IMAGING | Age: 62
End: 2022-02-01
Payer: MEDICAID

## 2022-02-01 ENCOUNTER — HOSPITAL ENCOUNTER (OUTPATIENT)
Dept: PULMONOLOGY | Age: 62
Discharge: HOME | End: 2022-02-01
Payer: MEDICAID

## 2022-02-01 ENCOUNTER — HOSPITAL ENCOUNTER (OUTPATIENT)
Dept: CT IMAGING | Age: 62
Discharge: HOME | End: 2022-02-01
Payer: MEDICAID

## 2022-02-01 DIAGNOSIS — R06.02 SHORTNESS OF BREATH: Primary | ICD-10-CM

## 2022-02-01 DIAGNOSIS — R06.02 SOB (SHORTNESS OF BREATH): ICD-10-CM

## 2022-02-01 DIAGNOSIS — Z53.8 PROCEDURE/TEST/EXAM NOT INDICATED: Primary | ICD-10-CM

## 2022-02-01 DIAGNOSIS — R06.02 SHORTNESS OF BREATH: ICD-10-CM

## 2022-02-01 LAB
BUN BLDV-MCNC: 13 MG/DL (ref 6–23)
CREAT SERPL-MCNC: 0.9 MG/DL (ref 0.5–1)
GFR AFRICAN AMERICAN: >60
GFR NON-AFRICAN AMERICAN: >60 ML/MIN/1.73

## 2022-02-01 PROCEDURE — 94729 DIFFUSING CAPACITY: CPT

## 2022-02-01 PROCEDURE — 94726 PLETHYSMOGRAPHY LUNG VOLUMES: CPT

## 2022-02-01 PROCEDURE — 82565 ASSAY OF CREATININE: CPT

## 2022-02-01 PROCEDURE — 84520 ASSAY OF UREA NITROGEN: CPT

## 2022-02-01 PROCEDURE — 71275 CT ANGIOGRAPHY CHEST: CPT

## 2022-02-01 PROCEDURE — 6360000004 HC RX CONTRAST MEDICATION: Performed by: RADIOLOGY

## 2022-02-01 PROCEDURE — 94060 EVALUATION OF WHEEZING: CPT

## 2022-02-01 PROCEDURE — 36415 COLL VENOUS BLD VENIPUNCTURE: CPT

## 2022-02-01 RX ADMIN — IOPAMIDOL 75 ML: 755 INJECTION, SOLUTION INTRAVENOUS at 12:02

## 2022-02-15 PROCEDURE — 94060 EVALUATION OF WHEEZING: CPT | Performed by: INTERNAL MEDICINE

## 2022-02-15 PROCEDURE — 94726 PLETHYSMOGRAPHY LUNG VOLUMES: CPT | Performed by: INTERNAL MEDICINE

## 2022-02-15 PROCEDURE — 94729 DIFFUSING CAPACITY: CPT | Performed by: INTERNAL MEDICINE

## 2022-02-15 NOTE — PROCEDURES
[address]                               PULMONARY FUNCTION    PATIENT NAME: Usman Corey                      :        1960  MED REC NO:   [id number]                            ROOM:  ACCOUNT NO:   [de-identified]                           ADMIT DATE: 2022  PROVIDER:     Keila Cortez MD    DATE OF PROCEDURE:  2022    SPIROMETRY:  FVC 2.26 which is 76 of predicted, FEV1 of 1.82 which is 78  of predicted. FEV1/FVC 81 which is 101 of predicted. LUNG VOLUME:  Slow vital capacity 3.16 which is 109 of predicted. ERV  0.09 which is 11 of predicted. RV 2.11 which is 105 of predicted. TLC  5.27 which is 107 of predicted. Diffusion capacity 124.11 which is 105 of predicted. ERV is 11. IMPRESSION:  This PFT showing nonspecific spirometry with restrictive  pattern though there is no decrease in DLCO and that is most likely  related to the patient's weight given the decrease in ERV. Diffusion  capacity is normal.  Other than that there is not any other evidence of  parenchymal lung disease in this PFT. Clinical and radiological  correlation indicated.         Edward Wilkins MD    D: 02/15/2022 12:04:38       T: 02/15/2022 12:08:08     [email or web address]  Job#: [id number]     Doc#: [id number]    CC:

## 2022-02-25 ENCOUNTER — TELEPHONE (OUTPATIENT)
Dept: PULMONOLOGY | Age: 62
End: 2022-02-25

## 2022-02-25 NOTE — TELEPHONE ENCOUNTER
Call returned to pt after VM and My chart contacts. Unable to leave VM to discuss pt's questions My chart message sent.

## 2022-02-28 NOTE — PROGRESS NOTES
This MA attempted to reach pt. No answer. This MA left message for pt requesting return call to discuss medications.     Electronically signed by Chidi Mesa MA on 2/28/22 at 9:47 AM EST

## 2022-02-28 NOTE — PROGRESS NOTES
This MA spoke with pt - pt advised to discontinue HCTZ per Dr. Kamari Mendoza. Pt verbalized she understood. Chart updated.       Electronically signed by Maria Smith MA on 2/28/22 at 3:07 PM EST

## 2022-03-11 ENCOUNTER — OFFICE VISIT (OUTPATIENT)
Dept: FAMILY MEDICINE CLINIC | Age: 62
End: 2022-03-11
Payer: MEDICAID

## 2022-03-11 VITALS
RESPIRATION RATE: 16 BRPM | HEART RATE: 72 BPM | DIASTOLIC BLOOD PRESSURE: 86 MMHG | OXYGEN SATURATION: 94 % | TEMPERATURE: 97.3 F | SYSTOLIC BLOOD PRESSURE: 132 MMHG | WEIGHT: 288 LBS | HEIGHT: 63 IN | BODY MASS INDEX: 51.03 KG/M2

## 2022-03-11 DIAGNOSIS — I10 ESSENTIAL HYPERTENSION: ICD-10-CM

## 2022-03-11 DIAGNOSIS — R73.01 IFG (IMPAIRED FASTING GLUCOSE): ICD-10-CM

## 2022-03-11 DIAGNOSIS — Z79.4 TYPE 2 DIABETES MELLITUS WITHOUT COMPLICATION, WITH LONG-TERM CURRENT USE OF INSULIN (HCC): ICD-10-CM

## 2022-03-11 DIAGNOSIS — R53.83 FATIGUE, UNSPECIFIED TYPE: ICD-10-CM

## 2022-03-11 DIAGNOSIS — E11.9 TYPE 2 DIABETES MELLITUS WITHOUT COMPLICATION, WITH LONG-TERM CURRENT USE OF INSULIN (HCC): ICD-10-CM

## 2022-03-11 DIAGNOSIS — E11.65 TYPE 2 DIABETES MELLITUS WITH HYPERGLYCEMIA, WITHOUT LONG-TERM CURRENT USE OF INSULIN (HCC): ICD-10-CM

## 2022-03-11 DIAGNOSIS — E11.9 TYPE 2 DIABETES MELLITUS WITHOUT COMPLICATION, WITH LONG-TERM CURRENT USE OF INSULIN (HCC): Primary | ICD-10-CM

## 2022-03-11 DIAGNOSIS — F32.A DEPRESSION, UNSPECIFIED DEPRESSION TYPE: ICD-10-CM

## 2022-03-11 DIAGNOSIS — G47.33 OSA (OBSTRUCTIVE SLEEP APNEA): ICD-10-CM

## 2022-03-11 DIAGNOSIS — E03.9 HYPOTHYROIDISM, UNSPECIFIED TYPE: ICD-10-CM

## 2022-03-11 DIAGNOSIS — M25.50 ARTHRALGIA, UNSPECIFIED JOINT: ICD-10-CM

## 2022-03-11 DIAGNOSIS — Z79.4 TYPE 2 DIABETES MELLITUS WITHOUT COMPLICATION, WITH LONG-TERM CURRENT USE OF INSULIN (HCC): Primary | ICD-10-CM

## 2022-03-11 DIAGNOSIS — E78.2 MIXED HYPERLIPIDEMIA: ICD-10-CM

## 2022-03-11 PROBLEM — N18.5 CHRONIC KIDNEY DISEASE, STAGE 5 (HCC): Status: RESOLVED | Noted: 2021-03-20 | Resolved: 2022-03-11

## 2022-03-11 LAB
ALBUMIN SERPL-MCNC: 4.2 G/DL (ref 3.5–5.2)
ALP BLD-CCNC: 97 U/L (ref 35–104)
ALT SERPL-CCNC: 26 U/L (ref 0–32)
ANION GAP SERPL CALCULATED.3IONS-SCNC: 13 MMOL/L (ref 7–16)
AST SERPL-CCNC: 35 U/L (ref 0–31)
BASOPHILS ABSOLUTE: 0.05 E9/L (ref 0–0.2)
BASOPHILS RELATIVE PERCENT: 0.5 % (ref 0–2)
BILIRUB SERPL-MCNC: 0.7 MG/DL (ref 0–1.2)
BUN BLDV-MCNC: 15 MG/DL (ref 6–23)
CALCIUM SERPL-MCNC: 9.9 MG/DL (ref 8.6–10.2)
CHLORIDE BLD-SCNC: 100 MMOL/L (ref 98–107)
CHOLESTEROL, TOTAL: 231 MG/DL (ref 0–199)
CO2: 27 MMOL/L (ref 22–29)
CREAT SERPL-MCNC: 0.9 MG/DL (ref 0.5–1)
EOSINOPHILS ABSOLUTE: 0.15 E9/L (ref 0.05–0.5)
EOSINOPHILS RELATIVE PERCENT: 1.6 % (ref 0–6)
GFR AFRICAN AMERICAN: >60
GFR NON-AFRICAN AMERICAN: >60 ML/MIN/1.73
GLUCOSE BLD-MCNC: 133 MG/DL (ref 74–99)
HBA1C MFR BLD: 8 % (ref 4–5.6)
HCT VFR BLD CALC: 41.5 % (ref 34–48)
HDLC SERPL-MCNC: 32 MG/DL
HEMOGLOBIN: 12.9 G/DL (ref 11.5–15.5)
IMMATURE GRANULOCYTES #: 0.06 E9/L
IMMATURE GRANULOCYTES %: 0.6 % (ref 0–5)
LDL CHOLESTEROL CALCULATED: 154 MG/DL (ref 0–99)
LYMPHOCYTES ABSOLUTE: 2.73 E9/L (ref 1.5–4)
LYMPHOCYTES RELATIVE PERCENT: 28.3 % (ref 20–42)
MCH RBC QN AUTO: 27.6 PG (ref 26–35)
MCHC RBC AUTO-ENTMCNC: 31.1 % (ref 32–34.5)
MCV RBC AUTO: 88.7 FL (ref 80–99.9)
MONOCYTES ABSOLUTE: 0.6 E9/L (ref 0.1–0.95)
MONOCYTES RELATIVE PERCENT: 6.2 % (ref 2–12)
NEUTROPHILS ABSOLUTE: 6.07 E9/L (ref 1.8–7.3)
NEUTROPHILS RELATIVE PERCENT: 62.8 % (ref 43–80)
PDW BLD-RTO: 15.3 FL (ref 11.5–15)
PLATELET # BLD: 211 E9/L (ref 130–450)
PMV BLD AUTO: 11.5 FL (ref 7–12)
POTASSIUM SERPL-SCNC: 4.5 MMOL/L (ref 3.5–5)
RBC # BLD: 4.68 E12/L (ref 3.5–5.5)
SODIUM BLD-SCNC: 140 MMOL/L (ref 132–146)
TOTAL PROTEIN: 7.8 G/DL (ref 6.4–8.3)
TRIGL SERPL-MCNC: 224 MG/DL (ref 0–149)
TSH SERPL DL<=0.05 MIU/L-ACNC: 4.07 UIU/ML (ref 0.27–4.2)
URIC ACID, SERUM: 8.2 MG/DL (ref 2.4–5.7)
VLDLC SERPL CALC-MCNC: 45 MG/DL
WBC # BLD: 9.7 E9/L (ref 4.5–11.5)

## 2022-03-11 PROCEDURE — G8484 FLU IMMUNIZE NO ADMIN: HCPCS | Performed by: FAMILY MEDICINE

## 2022-03-11 PROCEDURE — 3052F HG A1C>EQUAL 8.0%<EQUAL 9.0%: CPT | Performed by: FAMILY MEDICINE

## 2022-03-11 PROCEDURE — G8417 CALC BMI ABV UP PARAM F/U: HCPCS | Performed by: FAMILY MEDICINE

## 2022-03-11 PROCEDURE — 2022F DILAT RTA XM EVC RTNOPTHY: CPT | Performed by: FAMILY MEDICINE

## 2022-03-11 PROCEDURE — 1036F TOBACCO NON-USER: CPT | Performed by: FAMILY MEDICINE

## 2022-03-11 PROCEDURE — 3017F COLORECTAL CA SCREEN DOC REV: CPT | Performed by: FAMILY MEDICINE

## 2022-03-11 PROCEDURE — 99214 OFFICE O/P EST MOD 30 MIN: CPT | Performed by: FAMILY MEDICINE

## 2022-03-11 PROCEDURE — G8427 DOCREV CUR MEDS BY ELIG CLIN: HCPCS | Performed by: FAMILY MEDICINE

## 2022-03-11 RX ORDER — INSULIN GLARGINE 100 [IU]/ML
20 INJECTION, SOLUTION SUBCUTANEOUS NIGHTLY
Qty: 18 ML | Refills: 1 | Status: SHIPPED | OUTPATIENT
Start: 2022-03-11 | End: 2022-09-07

## 2022-03-11 RX ORDER — METOPROLOL SUCCINATE 100 MG/1
TABLET, EXTENDED RELEASE ORAL
Qty: 90 TABLET | Refills: 1 | Status: SHIPPED | OUTPATIENT
Start: 2022-03-11

## 2022-03-11 RX ORDER — CITALOPRAM 20 MG/1
TABLET ORAL
Qty: 90 TABLET | Refills: 1 | Status: SHIPPED | OUTPATIENT
Start: 2022-03-11

## 2022-03-11 RX ORDER — LISINOPRIL 40 MG/1
TABLET ORAL
Qty: 90 TABLET | Refills: 1 | Status: SHIPPED | OUTPATIENT
Start: 2022-03-11

## 2022-03-11 RX ORDER — LEVOTHYROXINE SODIUM 0.07 MG/1
TABLET ORAL
Qty: 90 TABLET | Refills: 1 | Status: SHIPPED | OUTPATIENT
Start: 2022-03-11

## 2022-03-11 SDOH — ECONOMIC STABILITY: FOOD INSECURITY: WITHIN THE PAST 12 MONTHS, YOU WORRIED THAT YOUR FOOD WOULD RUN OUT BEFORE YOU GOT MONEY TO BUY MORE.: NEVER TRUE

## 2022-03-11 SDOH — ECONOMIC STABILITY: FOOD INSECURITY: WITHIN THE PAST 12 MONTHS, THE FOOD YOU BOUGHT JUST DIDN'T LAST AND YOU DIDN'T HAVE MONEY TO GET MORE.: NEVER TRUE

## 2022-03-11 ASSESSMENT — PATIENT HEALTH QUESTIONNAIRE - PHQ9
SUM OF ALL RESPONSES TO PHQ QUESTIONS 1-9: 0
9. THOUGHTS THAT YOU WOULD BE BETTER OFF DEAD, OR OF HURTING YOURSELF: 0
SUM OF ALL RESPONSES TO PHQ QUESTIONS 1-9: 0
3. TROUBLE FALLING OR STAYING ASLEEP: 0
5. POOR APPETITE OR OVEREATING: 0
10. IF YOU CHECKED OFF ANY PROBLEMS, HOW DIFFICULT HAVE THESE PROBLEMS MADE IT FOR YOU TO DO YOUR WORK, TAKE CARE OF THINGS AT HOME, OR GET ALONG WITH OTHER PEOPLE: 0
SUM OF ALL RESPONSES TO PHQ QUESTIONS 1-9: 0
SUM OF ALL RESPONSES TO PHQ QUESTIONS 1-9: 0
2. FEELING DOWN, DEPRESSED OR HOPELESS: 0
SUM OF ALL RESPONSES TO PHQ9 QUESTIONS 1 & 2: 0
4. FEELING TIRED OR HAVING LITTLE ENERGY: 0
7. TROUBLE CONCENTRATING ON THINGS, SUCH AS READING THE NEWSPAPER OR WATCHING TELEVISION: 0
6. FEELING BAD ABOUT YOURSELF - OR THAT YOU ARE A FAILURE OR HAVE LET YOURSELF OR YOUR FAMILY DOWN: 0
8. MOVING OR SPEAKING SO SLOWLY THAT OTHER PEOPLE COULD HAVE NOTICED. OR THE OPPOSITE, BEING SO FIGETY OR RESTLESS THAT YOU HAVE BEEN MOVING AROUND A LOT MORE THAN USUAL: 0
1. LITTLE INTEREST OR PLEASURE IN DOING THINGS: 0

## 2022-03-11 ASSESSMENT — SOCIAL DETERMINANTS OF HEALTH (SDOH): HOW HARD IS IT FOR YOU TO PAY FOR THE VERY BASICS LIKE FOOD, HOUSING, MEDICAL CARE, AND HEATING?: NOT HARD AT ALL

## 2022-03-14 PROBLEM — E03.9 HYPOTHYROIDISM: Status: ACTIVE | Noted: 2022-03-14

## 2022-03-14 PROBLEM — I10 ESSENTIAL HYPERTENSION: Status: ACTIVE | Noted: 2022-03-14

## 2022-03-14 ASSESSMENT — ENCOUNTER SYMPTOMS
ABDOMINAL DISTENTION: 0
ABDOMINAL PAIN: 0
NAUSEA: 0
SINUS PRESSURE: 0
TROUBLE SWALLOWING: 0
BACK PAIN: 1
ANAL BLEEDING: 0
EYE PAIN: 0
STRIDOR: 0
WHEEZING: 0
VOMITING: 0
CHEST TIGHTNESS: 0
RECTAL PAIN: 0
SHORTNESS OF BREATH: 0
CHOKING: 0
EYE DISCHARGE: 0
EYES NEGATIVE: 1
CONSTIPATION: 0
COLOR CHANGE: 0
ALLERGIC/IMMUNOLOGIC NEGATIVE: 1
RHINORRHEA: 0
SORE THROAT: 0
FACIAL SWELLING: 0
EYE ITCHING: 0
VOICE CHANGE: 0
DIARRHEA: 0
COUGH: 0
SINUS PAIN: 0
EYE REDNESS: 0
BLOOD IN STOOL: 0
PHOTOPHOBIA: 0

## 2022-03-14 NOTE — PROGRESS NOTES
Ed Quiroz is a 59 y.o. female. HPI/Chief C/O:  Chief Complaint   Patient presents with    Hypothyroidism     follow up    Medication Refill     Pharmacyh verified and meds pended. No Known Allergies    This 59year old female presents for physical exam. Pt got cpap machine and is doing well with it. Pt follows with pulmonology in 1 week. Pt follows with cardiology. Pt has type 2 DM, hypothyroid, hypertension, hyperlipidemia, JOSE DANIEL, OA, pamela. Knee pain, and fatigue. A1C is 8.0.   ROS:  Review of Systems   Constitutional: Positive for fatigue. Negative for activity change, appetite change, chills, diaphoresis, fever and unexpected weight change. HENT: Negative. Negative for congestion, dental problem, drooling, ear discharge, ear pain, facial swelling, hearing loss, mouth sores, nosebleeds, postnasal drip, rhinorrhea, sinus pressure, sinus pain, sneezing, sore throat, tinnitus, trouble swallowing and voice change. Eyes: Negative. Negative for photophobia, pain, discharge, redness, itching and visual disturbance. Respiratory: Negative for cough, choking, chest tightness, shortness of breath, wheezing and stridor. Cardiovascular: Negative. Negative for chest pain, palpitations and leg swelling. Gastrointestinal: Negative for abdominal distention, abdominal pain, anal bleeding, blood in stool, constipation, diarrhea, nausea, rectal pain and vomiting. Endocrine: Negative for cold intolerance, heat intolerance, polydipsia, polyphagia and polyuria. Genitourinary: Positive for frequency and urgency. Negative for decreased urine volume, difficulty urinating, dysuria, flank pain, genital sores, hematuria, menstrual problem and pelvic pain. Musculoskeletal: Positive for arthralgias, back pain and myalgias. Negative for gait problem, joint swelling, neck pain and neck stiffness. Skin: Negative. Negative for color change, pallor, rash and wound. Allergic/Immunologic: Negative. Neurological: Negative. Negative for dizziness, tremors, seizures, syncope, facial asymmetry, speech difficulty, weakness, light-headedness, numbness and headaches. Hematological: Negative. Negative for adenopathy. Does not bruise/bleed easily. Psychiatric/Behavioral: Positive for sleep disturbance. Negative for agitation, behavioral problems, confusion, decreased concentration, dysphoric mood, hallucinations, self-injury and suicidal ideas. The patient is nervous/anxious. The patient is not hyperactive. Past Medical/Surgical Hx;  Reviewed with patient      Diagnosis Date    [facility] of blood transfusion 1624    at [facility] complication after kidney stone surgery    History of kidney stones     Hx of cardiovascular stress test 12/17/2019    Lexiscan stress test    Hypertension     Hypothyroidism     Obesity     Thyroid disease     Type 2 diabetes mellitus without complication (HCC)      Past Surgical History:   Procedure Laterality Date    BREAST SURGERY      CHOLECYSTECTOMY, LAPAROSCOPIC N/A 5/18/2020    LAPAROSCOPIC CHOLECYSTECTOMY WITH INTRAOPERATIVE CHOLANGIOGRAM performed by Kana Dunne MD at [facility]    HYSTERECTOMY      HYSTERECTOMY, VAGINAL      JOINT REPLACEMENT Right 2019    knee  [facility]    KIDNEY STONE SURGERY      incisional removal       Past Family Hx:  Reviewed with patient      Problem Relation Age of Onset    Lung Cancer Mother         lung    COPD Father        Social Hx:  Reviewed with patient  Social History     Tobacco Use    Smoking status: Never Smoker    Smokeless tobacco: Never Used   Substance Use Topics    Alcohol use: Not Currently     Comment: No caffeine       OBJECTIVE  /86   Pulse 72   Temp 97.3 °F (36.3 °C)   Resp 16   Ht 5' 3\" (1.6 m)   Wt 288 lb (130.6 kg)   LMP  (LMP Unknown)   SpO2 94%   Breastfeeding No   BMI 51.02 kg/m²     Problem List:  Sharla does not have any pertinent problems on file.     PHYS EX:  Physical Exam  Vitals and nursing note reviewed. Constitutional:       General: She is not in acute distress. Appearance: Normal appearance. She is well-developed. She is obese. She is not ill-appearing, toxic-appearing or diaphoretic. Comments: Patient has morbid obesity. Patient instructed on low calorie, healthy ADA diet. HENT:      Head: Normocephalic and atraumatic. Nose: Nose normal. No congestion or rhinorrhea. Eyes:      General: No scleral icterus. Right eye: No discharge. Left eye: No discharge. Conjunctiva/sclera: Conjunctivae normal.      Pupils: Pupils are equal, round, and reactive to light. Neck:      Thyroid: No thyromegaly. Vascular: No carotid bruit or JVD. Trachea: No tracheal deviation. Cardiovascular:      Rate and Rhythm: Normal rate and regular rhythm. Pulses: Normal pulses. Heart sounds: Normal heart sounds. No murmur heard. No friction rub. No gallop. Pulmonary:      Effort: Pulmonary effort is normal. No respiratory distress. Breath sounds: Normal breath sounds. No stridor. No wheezing, rhonchi or rales. Chest:      Chest wall: No tenderness. Abdominal:      General: Bowel sounds are normal. There is no distension. Palpations: Abdomen is soft. There is no mass. Tenderness: There is no abdominal tenderness. There is no right CVA tenderness, left CVA tenderness, guarding or rebound. Hernia: No hernia is present. Musculoskeletal:         General: Tenderness present. No swelling, deformity or signs of injury. Cervical back: Normal range of motion and neck supple. No rigidity. No muscular tenderness. Right lower leg: No edema. Left lower leg: No edema. Comments: Pain and decreased ROM multiple joints, and pamela. Knees. Lymphadenopathy:      Cervical: No cervical adenopathy. Skin:     General: Skin is warm. Coloration: Skin is not jaundiced or pale.       Findings: No bruising, erythema, lesion or rash. Neurological:      General: No focal deficit present. Mental Status: She is alert and oriented to person, place, and time. Cranial Nerves: No cranial nerve deficit. Sensory: No sensory deficit. Motor: No weakness or abnormal muscle tone. Coordination: Coordination normal.      Gait: Gait normal.      Deep Tendon Reflexes: Reflexes are normal and symmetric. Reflexes normal.   Psychiatric:         Mood and Affect: Mood normal.         Behavior: Behavior normal.         Thought Content: Thought content normal.         Judgment: Judgment normal.         ASSESSMENT/PLAN  Sharla was seen today for hypothyroidism and medication refill. Diagnoses and all orders for this visit:    Type 2 diabetes mellitus without complication, with long-term current use of insulin (HCC)  -     insulin glargine (LANTUS SOLOSTAR) 100 UNIT/ML injection pen; Inject 20 Units into the skin nightly  -     Insulin Pen Needle 31G X 5 MM MISC; 1 each by Does not apply route daily  -     CBC with Auto Differential; Future  -     Comprehensive Metabolic Panel; Future  -     Hemoglobin A1C; Future  Stable. Lantus, ace statin, aspirin, ADA diet. Hypothyroidism, unspecified type  -     levothyroxine (SYNTHROID) 75 MCG tablet; take 1 tablet by mouth once daily  -     CBC with Auto Differential; Future  -     Comprehensive Metabolic Panel; Future    Essential hypertension  -     lisinopril (PRINIVIL;ZESTRIL) 40 MG tablet; take 1 tablet by mouth once daily  -     metoprolol succinate (TOPROL XL) 100 MG extended release tablet; take 1 tablet by mouth once daily  -     CBC with Auto Differential; Future  -     Comprehensive Metabolic Panel; Future    Depression, unspecified depression type  -     citalopram (CELEXA) 20 MG tablet; take 1 tablet by mouth once daily  -     CBC with Auto Differential; Future  -     Comprehensive Metabolic Panel;  Future    JOSE DANIEL (obstructive sleep apnea)  -     CBC with Auto Differential; Future  -     Comprehensive Metabolic Panel; Future    Type 2 diabetes mellitus with hyperglycemia, without long-term current use of insulin (HCC)  -     CBC with Auto Differential; Future  -     Comprehensive Metabolic Panel; Future    Mixed hyperlipidemia  -     CBC with Auto Differential; Future  -     Comprehensive Metabolic Panel; Future  -     Lipid Panel; Future    IFG (impaired fasting glucose)  -     CBC with Auto Differential; Future  -     Comprehensive Metabolic Panel; Future  -     Hemoglobin A1C; Future    Fatigue, unspecified type  -     CBC with Auto Differential; Future  -     Comprehensive Metabolic Panel; Future  -     TSH; Future    Arthralgia, unspecified joint  -     CBC with Auto Differential; Future  -     Comprehensive Metabolic Panel; Future  -     Uric Acid; Future    Pt instructed on low salt, low chol, and ADA diets. Pt instructed if any worse go ED ASAP.     Outpatient Encounter Medications as of 3/11/2022   Medication Sig Dispense Refill    insulin glargine (LANTUS SOLOSTAR) 100 UNIT/ML injection pen Inject 20 Units into the skin nightly 18 mL 1    Insulin Pen Needle 31G X 5 MM MISC 1 each by Does not apply route daily 100 each 3    levothyroxine (SYNTHROID) 75 MCG tablet take 1 tablet by mouth once daily 90 tablet 1    lisinopril (PRINIVIL;ZESTRIL) 40 MG tablet take 1 tablet by mouth once daily 90 tablet 1    metoprolol succinate (TOPROL XL) 100 MG extended release tablet take 1 tablet by mouth once daily 90 tablet 1    citalopram (CELEXA) 20 MG tablet take 1 tablet by mouth once daily 90 tablet 1    busPIRone (BUSPAR) 5 MG tablet Take 1 tablet by mouth 2 times daily as needed (anxiety    depression) 180 tablet 1    aspirin EC 81 MG EC tablet Take 1 tablet by mouth daily 90 tablet 1    tamsulosin (FLOMAX) 0.4 MG capsule Take 0.4 mg by mouth daily      ondansetron (ZOFRAN ODT) 8 MG TBDP disintegrating tablet Take 1 tablet by mouth every 8 hours as needed for Nausea 12 tablet 0    blood glucose monitor strips Test 3 times a day & as needed for symptoms of irregular blood glucose. Dispense sufficient amount for indicated testing frequency plus additional to accommodate PRN testing needs. ONE TOUCH ULTRA. 300 strip 1    albuterol sulfate  (90 Base) MCG/ACT inhaler Inhale 2 puffs into the lungs every 6 hours as needed for Wheezing or Shortness of Breath 1 Inhaler 3    [DISCONTINUED] budesonide-formoterol (SYMBICORT) 160-4.5 MCG/ACT AERO Inhale 2 puffs into the lungs 2 times daily Rinse mouth after use. (Patient not taking: Reported on 3/11/2022) 1 each 2    [DISCONTINUED] albuterol sulfate HFA (PROAIR HFA) 108 (90 Base) MCG/ACT inhaler Inhale 2 puffs into the lungs every 4 hours as needed for Wheezing or Shortness of Breath (Patient not taking: Reported on 3/11/2022) 18 g 6    pravastatin (PRAVACHOL) 20 MG tablet Take 1 tablet by mouth daily (Patient not taking: Reported on 3/11/2022) 90 tablet 1    [DISCONTINUED] insulin glargine (LANTUS SOLOSTAR) 100 UNIT/ML injection pen Inject 20 Units into the skin nightly 18 mL 1    [DISCONTINUED] Insulin Pen Needle 31G X 5 MM MISC 1 each by Does not apply route daily 100 each 3    [DISCONTINUED] levothyroxine (SYNTHROID) 75 MCG tablet take 1 tablet by mouth once daily 90 tablet 1    [DISCONTINUED] citalopram (CELEXA) 20 MG tablet take 1 tablet by mouth once daily (Patient not taking: Reported on 3/11/2022) 90 tablet 1    [DISCONTINUED] lisinopril (PRINIVIL;ZESTRIL) 40 MG tablet take 1 tablet by mouth once daily 90 tablet 1    [DISCONTINUED] metoprolol succinate (TOPROL XL) 100 MG extended release tablet take 1 tablet by mouth once daily 90 tablet 1     No facility-administered encounter medications on file as of 3/11/2022. Return in about 3 months (around 6/11/2022).         Reviewed recent labs related to Sharla's current problems      Discussed importance of regular Health Maintenance follow up  Health Maintenance   Topic    COVID-19 Vaccine (1)    Pneumococcal 0-64 years Vaccine (1 of 2 - PPSV23)    Diabetic foot exam     HIV screen     Shingles Vaccine (1 of 2)    Diabetic retinal exam     Flu vaccine (1)    A1C test (Diabetic or Prediabetic)     Lipid screen     Depression Monitoring     TSH testing     Potassium monitoring     Creatinine monitoring     Breast cancer screen     DTaP/Tdap/Td vaccine (3 - Td or Tdap)    Colorectal Cancer Screen     Hepatitis C screen     Hepatitis A vaccine     Hib vaccine     Meningococcal (ACWY) vaccine

## 2022-03-16 ENCOUNTER — TELEPHONE (OUTPATIENT)
Dept: SLEEP CENTER | Age: 62
End: 2022-03-16

## 2022-03-18 ENCOUNTER — TELEPHONE (OUTPATIENT)
Dept: SLEEP CENTER | Age: 62
End: 2022-03-18

## 2022-03-18 ENCOUNTER — OFFICE VISIT (OUTPATIENT)
Dept: PULMONOLOGY | Age: 62
End: 2022-03-18
Payer: MEDICAID

## 2022-03-18 VITALS
RESPIRATION RATE: 18 BRPM | SYSTOLIC BLOOD PRESSURE: 154 MMHG | HEART RATE: 65 BPM | BODY MASS INDEX: 50.68 KG/M2 | OXYGEN SATURATION: 94 % | WEIGHT: 286 LBS | TEMPERATURE: 95.3 F | DIASTOLIC BLOOD PRESSURE: 70 MMHG | HEIGHT: 63 IN

## 2022-03-18 DIAGNOSIS — R06.09 DYSPNEA ON EXERTION: ICD-10-CM

## 2022-03-18 PROCEDURE — G8417 CALC BMI ABV UP PARAM F/U: HCPCS | Performed by: INTERNAL MEDICINE

## 2022-03-18 PROCEDURE — G8427 DOCREV CUR MEDS BY ELIG CLIN: HCPCS | Performed by: INTERNAL MEDICINE

## 2022-03-18 PROCEDURE — 1036F TOBACCO NON-USER: CPT | Performed by: INTERNAL MEDICINE

## 2022-03-18 PROCEDURE — 3017F COLORECTAL CA SCREEN DOC REV: CPT | Performed by: INTERNAL MEDICINE

## 2022-03-18 PROCEDURE — 99214 OFFICE O/P EST MOD 30 MIN: CPT | Performed by: INTERNAL MEDICINE

## 2022-03-18 PROCEDURE — G8484 FLU IMMUNIZE NO ADMIN: HCPCS | Performed by: INTERNAL MEDICINE

## 2022-03-18 PROCEDURE — 99213 OFFICE O/P EST LOW 20 MIN: CPT | Performed by: INTERNAL MEDICINE

## 2022-03-18 NOTE — PROGRESS NOTES
Pulmonary [facility]                             Pulmonary Consult/Progress Note :          Patient: Mirna De La Torre  MRN: [id number]  : 1960          Reason for Consultation:SOB ,Hypoxia   CC : SOB   HPI:   Mirna De La Torre is a 59y.o. year old who never smoke in the past and no second hand smoking who has been having Progressive SOB for the last 2 year,got worse in the last 2 years and got worse in the last 6 months   She start to have [address] feets will make her SOB   No history of DVT or PE    Was seen by cardiology as she state and was told she is Ok ,2 d echo shows stage II diastolic      No history of asthma ,No recent weight gain ,No industrial exposure No mold      PAST MEDICAL HISTORY:     Past Medical History:   Diagnosis Date    [facility] of blood transfusion 5916    at [facility] complication after kidney stone surgery    History of kidney stones     Hx of cardiovascular stress test 2019    Lexiscan stress test    Hypertension     Hypothyroidism     Obesity     Thyroid disease     Type 2 diabetes mellitus without complication ([facility] [address]        PAST SURGICAL HISTORY:   Past Surgical History:   Procedure Laterality Date    BREAST SURGERY      CHOLECYSTECTOMY, LAPAROSCOPIC N/A 2020    LAPAROSCOPIC CHOLECYSTECTOMY WITH INTRAOPERATIVE CHOLANGIOGRAM performed by Cassy Erickson MD at [place] [address], VAGINAL      [facility]    KIDNEY STONE SURGERY      incisional removal       FAMILY HISTORY:   Family History   Problem Relation Age of Onset    Lung Cancer Mother         lung    COPD Father        SOCIAL HISTORY:   Social History     Socioeconomic History    Marital status:      Spouse name: Not on file    Number of children: Not on file    Years of education: Not on file    Highest education level: Not on file   Occupational History    Not on file   Tobacco Use    Smoking status: Never Smoker    Smokeless tobacco: Never Used   Vaping Use    Vaping Use: Never used   Substance and Sexual Activity    Alcohol use: Not Currently     Comment: No caffeine    Drug use: No    Sexual activity: Not on file   Other Topics Concern    Not on file   Social History Narrative    Not on file     Social Determinants of Health     Financial Resource Strain: Low Risk     Difficulty of Paying Living Expenses: Not hard at all   Food Insecurity: No Food Insecurity    Worried About Running Out of Food in the Last Year: Never true    Wojciech of Food in the Last Year: Never true   Transportation Needs:     Lack of Transportation (Medical): Not on file    Lack of Transportation (Non-Medical):  Not on file   Physical Activity:     Days of Exercise per Week: Not on file    Minutes of Exercise per Session: Not on file   Stress:     Feeling of Stress : Not on file   Social Connections:     Frequency of Communication with Friends and Family: Not on file    Frequency of Social Gatherings with Friends and Family: Not on file    Attends Hoahaoism Services: Not on file    Active Member of [address] [place] [address] or Organizations: Not on file    Attends Club or Organization Meetings: Not on file    Marital Status: Not on file   Intimate Partner Violence:     Fear of Current or Ex-Partner: Not on file    Emotionally Abused: Not on file    Physically Abused: Not on file    Sexually Abused: Not on file   Housing Stability:     Unable to Pay for Housing in the Last Year: Not on file    Number of [place] in the Last Year: Not on file    Unstable Housing in the Last Year: Not on file     Social History     Tobacco Use   Smoking Status Never Smoker   Smokeless Tobacco Never Used     Social History     Substance and Sexual Activity   Alcohol Use Not Currently    Comment: No caffeine     Social History     Substance and Sexual Activity   Drug Use No           HOME MEDICATIONS:  Prior to Admission medications    Medication Sig Start Date End Date Taking? Authorizing Provider   albuterol sulfate  (90 Base) MCG/ACT inhaler Inhale 2 puffs into the lungs every 6 hours as needed for Wheezing or Shortness of Breath 1/27/21  Yes Esperanza Joel, DO   insulin glargine (LANTUS SOLOSTAR) 100 UNIT/ML injection pen Inject 20 Units into the skin nightly 3/11/22 9/7/22  Esperanza Joel, DO   Insulin Pen Needle 31G X 5 MM MISC 1 each by Does not apply route daily 3/11/22   Eliana Joel, DO   levothyroxine (SYNTHROID) 75 MCG tablet take 1 tablet by mouth once daily 3/11/22   Eliana Joel, DO   lisinopril (PRINIVIL;ZESTRIL) 40 MG tablet take 1 tablet by mouth once daily 3/11/22   Eliana Joel, DO   metoprolol succinate (TOPROL XL) 100 MG extended release tablet take 1 tablet by mouth once daily 3/11/22   Eliana Joel, DO   citalopram (CELEXA) 20 MG tablet take 1 tablet by mouth once daily 3/11/22   Esperanza BRAN Catterlin, DO   busPIRone (BUSPAR) 5 MG tablet Take 1 tablet by mouth 2 times daily as needed (anxiety    depression) 1/7/22 7/6/22  Eliana Joel, DO   aspirin EC 81 MG EC tablet Take 1 tablet by mouth daily 11/5/21   Eliana Joel, DO   pravastatin (PRAVACHOL) 20 MG tablet Take 1 tablet by mouth daily  Patient not taking: Reported on 3/11/2022 11/5/21   Eliana Joel, DO   tamsulosin (FLOMAX) 0.4 MG capsule Take 0.4 mg by mouth daily    Historical Provider, MD   ondansetron (ZOFRAN ODT) 8 MG TBDP disintegrating tablet Take 1 tablet by mouth every 8 hours as needed for Nausea 6/11/21   Rosibel Laws, DO   blood glucose monitor strips Test 3 times a day & as needed for symptoms of irregular blood glucose. Dispense sufficient amount for indicated testing frequency plus additional to accommodate PRN testing needs. ONE TOUCH ULTRA. 4/8/21   Gissell Joel,        CURRENT MEDICATIONS:  No current facility-administered medications for this visit.     IV MEDICATIONS:      ALLERGIES:  No Known Allergies    REVIEW OF SYSTEMS:  General ROS:  No weight loss ,no fatigue     ENT ROS:   No Sore throat ,no lymphoadenopathy,no nasal stuffiness     Hematological and Lymphatic ROS:   No ecchymosis ,no tendency to bleed  Respiratory ROS:   SOB  Cardiovascular ROS:   No CP,No Palpitation   Gastrointestinal ROS:   No Gi bleed,no nausea or vomiting      - Musculoskeletal ROS:      - no joint swelling ,no joint pain   Neurological ROS:     -no weakness or numbness    Dermatological ROS:   No skin rash ,no urticaria     PHYSICAL EXAMINATION:     VITAL SIGNS:  BP (!) 154/70   Pulse 65   Temp 95.3 °F (35.2 °C)   Resp 18   Ht 5' 3\" (1.6 m)   Wt 286 lb (129.7 kg)   LMP  (LMP Unknown)   SpO2 94%   BMI 50.66 kg/m²   Wt Readings from Last 3 Encounters:   03/18/22 286 lb (129.7 kg)   03/11/22 288 lb (130.6 kg)   12/10/21 281 lb (127.5 kg)     Temp Readings from Last 3 Encounters:   03/18/22 95.3 °F (35.2 °C)   03/11/22 97.3 °F (36.3 °C)   12/10/21 94.6 °F (34.8 °C)     TMAX:  BP Readings from Last 3 Encounters:   03/18/22 (!) 154/70   03/11/22 132/86   12/10/21 135/76     Pulse Readings from Last 3 Encounters:   03/18/22 65   03/11/22 72   12/10/21 64           INTAKE/OUTPUTS:  No intake/output data recorded.   No intake or output data in the 24 hours ending 03/18/22 1015    General Appearance: alert and oriented to person, place and time, well-developed and   well-nourished, in no acute distress   Eyes: pupils equal, round, and reactive to light, extraocular eye movements intact, conjunctivae normal and sclera anicteric   Neck: neck supple and non tender without mass, no thyromegaly, no thyroid nodules and no cervical adenopathy   Pulmonary/Chest:scattered rhonchi  Cardiovascular: normal rate, regular rhythm, normal S1 and S2, no murmurs, rubs, clicks or gallops, distal pulses intact, no carotid bruits, no murmurs, no gallops, no carotid bruits and no JVD   Abdomen: obese, soft, non-tender, non-distended, normal bowel sounds, no masses or organomegaly   Extremities:*mild edema  Musculoskeletal: normal range of motion, no joint swelling, deformity or tenderness   Neurologic: reflexes normal and symmetric, no cranial nerve deficit noted    LABS/IMAGING:    CBC:  Lab Results   Component Value Date    WBC 9.7 03/11/2022    HGB 12.9 03/11/2022    HCT 41.5 03/11/2022    MCV 88.7 03/11/2022     03/11/2022    LYMPHOPCT 28.3 03/11/2022    RBC 4.68 03/11/2022    MCH 27.6 03/11/2022    MCHC 31.1 (L) 03/11/2022    RDW 15.3 (H) 03/11/2022    NEUTOPHILPCT 62.8 03/11/2022    MONOPCT 6.2 03/11/2022    BASOPCT 0.5 03/11/2022    NEUTROABS 6.07 03/11/2022    LYMPHSABS 2.73 03/11/2022    MONOSABS 0.60 03/11/2022    EOSABS 0.15 03/11/2022    BASOSABS 0.05 03/11/2022       Recent Labs     03/11/22  1200   WBC 9.7   HGB 12.9   HCT 41.5   MCV 88.7          BMP:   No results for input(s): NA, K, CL, CO2, PHOS, BUN, CREATININE, CA in the last 72 hours.     MG:   Lab Results   Component Value Date    MG 1.9 08/12/2021     Ca/Phos:   Lab Results   Component Value Date    CALCIUM 9.9 03/11/2022    PHOS 4.5 12/06/2019     Amylase: No results found for: AMYLASE  Lipase:         MICROBIOLOGY:        PROBLEM LIST:  Patient Active Problem List   Diagnosis    Hypertension    Depressed    Mixed hyperlipidemia    Fatigue    Chronic pain of both knees    Vitamin D deficiency    Elevated hemoglobin A1c    Elevated liver enzymes    Osteoarthritis    Acquired hypothyroidism    Morbid obesity (HCC)    Palpitations    Arthralgia    Dyspnea on exertion    Type 2 diabetes mellitus with hyperglycemia, without long-term current use of insulin (HCC)    Positive colorectal cancer screening using Cologuard test    Stage 2 chronic kidney disease    Bilateral breast cysts    Shortness of breath    History of cholecystectomy    Fatty liver    Flank pain    Snores    Stage 3b chronic kidney disease ([facility] [address]  Hydronephrosis    Recurrent kidney stones    JOSE DANIEL (obstructive sleep apnea)    Hypothyroidism    Essential hypertension               ASSESSMENT:    1.) SOB  2.)Possible JOSE DANIEL/OHS  3.)BMI 50  4. )HFpEF with diastolic dysfunction  5.)Mild Hypoxia       PLAN:  *-patient SOB and hypoxia seems multidactorial  *-ambulated in office ,sat drop 91 after around 5-6 m  *-will do the following work up   *-HRCT  *_full PFT  *eosinophilic count   *_Symbicort   Albuterol   Continue diuresis   May Need RHC,will decide after after work up above   Will need sleep study down the road  KATY . sed rate   D dimer   Will see in 2 months      Thank you very much for allowing me to participate in the care of this pleasant patient , should you have any questions ,please do not hesitate to contact me      Martin Benitez MD,[facility]  Pulmonary&Critical Care Medicine   Director of [address] [place] [address] Director of [address] [place] [address]    Marisol Little    NOTE: This report was transcribed using voice recognition software. Every effort was made to ensure accuracy; however, inadvertent computerized transcription errors may be present.

## 2022-03-18 NOTE — PROGRESS NOTES
2 mos follow up today in office. Reviewed recent PFT and all other testing which show lungs are clear and no COPD or Asthma. Lengthy discussion about pt's weight being the major factor for her sob on exertion. Pt to follow with PCP; no further needs in our office. Pt discussed weight loss and exercise being her best plan for improvements.

## 2022-03-18 NOTE — PROGRESS NOTES
Pulmonary [facility]                             Pulmonary Consult/Progress Note :          Patient: Hao Mars  MRN: [id number]  : 1960          Reason for Consultation:SOB ,Hypoxia   CC : SOB   HPI:   Hao Mars is a 59y.o. year old who never smoke in the past and no second hand smoking who has been having Progressive SOB for the last 2 year,got worse in the last 2 years and got worse in the last 6 months   She start to have [address] feets will make her SOB   No history of DVT or PE    Was seen by cardiology as she state and was told she is Ok ,2 d echo shows stage II diastolic      No history of asthma ,No recent weight gain ,No industrial exposure No mold        Today visit  She has  been doing fair  Still with MICHAEL and SOB   She lost 3 pounds   Will give 6 months if breathing does not improve then will do further work up    [address]:     Past Medical History:   Diagnosis Date    [facility] of [facility] transfusion 0455    [facility] complication after kidney stone surgery    History of kidney stones     Hx of cardiovascular stress test 2019    Lexiscan stress test    Hypertension     Hypothyroidism     Obesity     Thyroid disease     Type 2 diabetes mellitus without complication ([facility] [address]        PAST SURGICAL HISTORY:   Past Surgical History:   Procedure Laterality Date    BREAST SURGERY      CHOLECYSTECTOMY, LAPAROSCOPIC N/A 2020    LAPAROSCOPIC CHOLECYSTECTOMY WITH INTRAOPERATIVE CHOLANGIOGRAM performed by Chet Fletcher MD at [place] [address], VAGINAL      JOINT REPLACEMENT Right 2019    knee  [facility]    KIDNEY STONE SURGERY      incisional removal       FAMILY HISTORY:   Family History   Problem Relation Age of Onset    Lung Cancer Mother         lung    COPD Father        SOCIAL HISTORY:   Social History     Socioeconomic History    Marital status:      Spouse name: Not on file    Number of children: Not on file    Years of education: Not on file    Highest education level: Not on file   Occupational History    Not on file   Tobacco Use    Smoking status: Never Smoker    Smokeless tobacco: Never Used   Vaping Use    Vaping Use: Never used   Substance and Sexual Activity    Alcohol use: Not Currently     Comment: No caffeine    Drug use: No    Sexual activity: Not on file   Other Topics Concern    Not on file   Social History Narrative    Not on file     Social Determinants of Health     Financial Resource Strain: Low Risk     Difficulty of Paying Living Expenses: Not hard at all   Food Insecurity: No Food Insecurity    Worried About [facility] in the Last Year: Never true    [facility] in the Last Year: Never true   Transportation Needs:     Lack of Transportation (Medical): Not on file    Lack of Transportation (Non-Medical):  Not on file   Physical Activity:     Days of Exercise per Week: Not on file    Minutes of Exercise per Session: Not on file   Stress:     Feeling of Stress : Not on file   Social Connections:     Frequency of Communication with Friends and Family: Not on file    Frequency of Social Gatherings with Friends and Family: Not on file    Attends [employer] Services: Not on file    Active Member of [address] [place] [address] [employer] or Organizations: Not on file    Attends Club or Organization Meetings: Not on file    Marital Status: Not on file   Intimate Partner Violence:     Fear of Current or Ex-Partner: Not on file    Emotionally Abused: Not on file    Physically Abused: Not on file    Sexually Abused: Not on file   Housing Stability:     Unable to Pay for Housing in the Last Year: Not on file    Number of [place] in the Last Year: Not on file    Unstable Housing in the Last Year: Not on file     Social History     Tobacco Use   Smoking Status Never Smoker   Smokeless Tobacco Never Used     Social History     Substance and Sexual PRN testing needs. ONE TOUCH ULTRA. 4/8/21   Dagmar Joel DO       CURRENT MEDICATIONS:  No current facility-administered medications for this visit. IV MEDICATIONS:      ALLERGIES:  No Known Allergies    REVIEW OF SYSTEMS:  General ROS:  No weight loss ,no fatigue     ENT ROS:   No Sore throat ,no lymphoadenopathy,no nasal stuffiness     Hematological and Lymphatic ROS:   No ecchymosis ,no tendency to bleed  Respiratory ROS:   SOB  Cardiovascular ROS:   No CP,No Palpitation   Gastrointestinal ROS:   No Gi bleed,no nausea or vomiting      - Musculoskeletal ROS:      - no joint swelling ,no joint pain   Neurological ROS:     -no weakness or numbness    Dermatological ROS:   No skin rash ,no urticaria     PHYSICAL EXAMINATION:     VITAL SIGNS:  BP (!) 154/70   Pulse 65   Temp 95.3 °F (35.2 °C)   Resp 18   Ht 5' 3\" (1.6 m)   Wt 286 lb (129.7 kg)   LMP  (LMP Unknown)   SpO2 94%   BMI 50.66 kg/m²   Wt Readings from Last 3 Encounters:   03/18/22 286 lb (129.7 kg)   03/11/22 288 lb (130.6 kg)   12/10/21 281 lb (127.5 kg)     Temp Readings from Last 3 Encounters:   03/18/22 95.3 °F (35.2 °C)   03/11/22 97.3 °F (36.3 °C)   12/10/21 94.6 °F (34.8 °C)     TMAX:  BP Readings from Last 3 Encounters:   03/18/22 (!) 154/70   03/11/22 132/86   12/10/21 135/76     Pulse Readings from Last 3 Encounters:   03/18/22 65   03/11/22 72   12/10/21 64           INTAKE/OUTPUTS:  No intake/output data recorded.   No intake or output data in the 24 hours ending 03/18/22 1017    General Appearance: alert and oriented to person, place and time, well-developed and   well-nourished, in no acute distress   Eyes: pupils equal, round, and reactive to light, extraocular eye movements intact, conjunctivae normal and sclera anicteric   Neck: neck supple and non tender without mass, no thyromegaly, no thyroid nodules and no cervical adenopathy   Pulmonary/Chest:scattered rhonchi  Cardiovascular: normal rate, regular rhythm, normal S1 and S2, no murmurs, rubs, clicks or gallops, distal pulses intact, no carotid bruits, no murmurs, no gallops, no carotid bruits and no JVD   Abdomen: obese, soft, non-tender, non-distended, normal bowel sounds, no masses or organomegaly   Extremities:*mild edema  Musculoskeletal: normal range of motion, no joint swelling, deformity or tenderness   Neurologic: reflexes normal and symmetric, no cranial nerve deficit noted    LABS/IMAGING:    CBC:  Lab Results   Component Value Date    WBC 9.7 03/11/2022    HGB 12.9 03/11/2022    HCT 41.5 03/11/2022    MCV 88.7 03/11/2022     03/11/2022    LYMPHOPCT 28.3 03/11/2022    RBC 4.68 03/11/2022    MCH 27.6 03/11/2022    MCHC 31.1 (L) 03/11/2022    RDW 15.3 (H) 03/11/2022    NEUTOPHILPCT 62.8 03/11/2022    MONOPCT 6.2 03/11/2022    BASOPCT 0.5 03/11/2022    NEUTROABS 6.07 03/11/2022    LYMPHSABS 2.73 03/11/2022    MONOSABS 0.60 03/11/2022    EOSABS 0.15 03/11/2022    BASOSABS 0.05 03/11/2022       Recent Labs     03/11/22  1200   WBC 9.7   HGB 12.9   HCT 41.5   MCV 88.7          BMP:   No results for input(s): NA, K, CL, CO2, PHOS, BUN, CREATININE, CA in the last 72 hours.     MG:   Lab Results   Component Value Date    MG 1.9 08/12/2021     Ca/Phos:   Lab Results   Component Value Date    CALCIUM 9.9 03/11/2022    PHOS 4.5 12/06/2019     Amylase: No results found for: AMYLASE  Lipase:         MICROBIOLOGY:        PROBLEM LIST:  Patient Active Problem List   Diagnosis    Hypertension    Depressed    Mixed hyperlipidemia    Fatigue    Chronic pain of both knees    Vitamin D deficiency    Elevated hemoglobin A1c    Elevated liver enzymes    Osteoarthritis    Acquired hypothyroidism    Morbid obesity (HCC)    Palpitations    Arthralgia    Dyspnea on exertion    Type 2 diabetes mellitus with hyperglycemia, without long-term current use of insulin (HCC)    Positive colorectal cancer screening using Cologuard test    Stage 2 chronic kidney disease    Bilateral breast cysts    Shortness of breath    History of cholecystectomy    Fatty liver    Flank pain    Snores    Stage 3b chronic kidney disease (HCC)    Hydronephrosis    Recurrent kidney stones    JOSE DANIEL (obstructive sleep apnea)    Hypothyroidism    Essential hypertension           SPIROMETRY:  FVC 2.26 which is 76 of predicted, FEV1 of 1.82 which is 78  of predicted. FEV1/FVC 81 which is 101 of predicted.     LUNG VOLUME:  Slow vital capacity 3.16 which is 109 of predicted. ERV  0.09 which is 11 of predicted. RV 2.11 which is 105 of predicted. TLC  5.27 which is 107 of predicted.     Diffusion capacity 124.11 which is 105 of predicted. ERV is 11.     IMPRESSION:  This PFT showing nonspecific spirometry with restrictive  pattern though there is no decrease in DLCO and that is most likely  related to the patient's weight given the decrease in ERV. Diffusion  capacity is normal.  Other than that there is not any other evidence of  parenchymal lung disease in this PFT. Clinical and radiological  correlation indicated. ASSESSMENT:    1.) SOB  2.)Possible JOSE DANIEL/OHS  3.)BMI 50  4. )HFpEF with diastolic dysfunction  5.)Mild Hypoxia       PLAN:  CTA no PE ,.NO ILD   2 d echo no sign of PHTN   *-patient SOB and hypoxia seems multifactorial   *-ambulated in office ,sat drop 91 after around 5-6 m  *_full PFT point weight as main issues   *eosinophilic count   *_Symbicort   Albuterol   Continue diuresis     On CPAP   KATY . sed rate  Ok   To follow with PCP and if SOB does not resolve after weight loss ,cionsider referral agaion for further work up    Thank you very much for allowing me to participate in the care of this pleasant patient , should you have any questions ,please do not hesitate to contact me      [address]  Pulmonary&Critical Care Medicine   Director of [address] [place] [address] Director of Patricia Gamboa    Nieves Myers    NOTE: This report was transcribed using voice recognition software. Every effort was made to ensure accuracy; however, inadvertent computerized transcription errors may be present.

## 2022-04-05 RX ORDER — PRAVASTATIN SODIUM 40 MG
40 TABLET ORAL DAILY
Qty: 90 TABLET | Refills: 1 | Status: SHIPPED | OUTPATIENT
Start: 2022-04-05 | End: 2022-10-02

## 2022-05-10 ENCOUNTER — HOSPITAL ENCOUNTER (OUTPATIENT)
Age: 62
Discharge: HOME | End: 2022-05-10
Payer: MEDICAID

## 2022-05-10 DIAGNOSIS — M25.50 ARTHRALGIA, UNSPECIFIED JOINT: ICD-10-CM

## 2022-05-10 DIAGNOSIS — R53.83 FATIGUE, UNSPECIFIED TYPE: ICD-10-CM

## 2022-05-10 LAB
C-REACTIVE PROTEIN: 1 MG/DL (ref 0–0.4)
RHEUMATOID FACTOR: 24 IU/ML (ref 0–13)
SEDIMENTATION RATE, ERYTHROCYTE: 35 MM/HR (ref 0–20)
VITAMIN D 25-HYDROXY: 18 NG/ML (ref 30–100)

## 2022-05-10 PROCEDURE — 85651 RBC SED RATE NONAUTOMATED: CPT

## 2022-05-10 PROCEDURE — 86038 ANTINUCLEAR ANTIBODIES: CPT

## 2022-05-10 PROCEDURE — 82306 VITAMIN D 25 HYDROXY: CPT

## 2022-05-10 PROCEDURE — 86140 C-REACTIVE PROTEIN: CPT

## 2022-05-10 PROCEDURE — 36415 COLL VENOUS BLD VENIPUNCTURE: CPT

## 2022-05-10 PROCEDURE — 86431 RHEUMATOID FACTOR QUANT: CPT

## 2022-05-10 PROCEDURE — 86200 CCP ANTIBODY: CPT

## 2022-05-11 ENCOUNTER — PATIENT MESSAGE (OUTPATIENT)
Dept: FAMILY MEDICINE CLINIC | Age: 62
End: 2022-05-11

## 2022-05-11 ENCOUNTER — TELEPHONE (OUTPATIENT)
Dept: BREAST CENTER | Age: 62
End: 2022-05-11

## 2022-05-11 DIAGNOSIS — R70.0 ELEVATED SED RATE: ICD-10-CM

## 2022-05-11 DIAGNOSIS — R79.82 ELEVATED C-REACTIVE PROTEIN (CRP): ICD-10-CM

## 2022-05-11 DIAGNOSIS — R76.8 ELEVATED RHEUMATOID FACTOR: Primary | ICD-10-CM

## 2022-05-11 LAB — ANTI-NUCLEAR ANTIBODY (ANA): NEGATIVE

## 2022-05-11 RX ORDER — ERGOCALCIFEROL 1.25 MG/1
50000 CAPSULE ORAL WEEKLY
Qty: 12 CAPSULE | Refills: 1 | Status: SHIPPED | OUTPATIENT
Start: 2022-05-11

## 2022-05-11 NOTE — TELEPHONE ENCOUNTER
Left message with call back number in attempt to schedule her clinical follow up with recommended imaging in June.  (Bilateral diagnostic mammogram and bilateral limited ultrasounds)

## 2022-05-11 NOTE — TELEPHONE ENCOUNTER
From: Rory Mir  To: Dr. Velasco Certain  Sent: 5/11/2022 8:09 AM EDT  Subject: Question regarding VITAMIN D 25 HYDROXY    I haven't taken any vitamin D for quite awhile. Do I need a referral for rheumatologist? Is this why I have stiffness and pain in the back?   Thank you

## 2022-05-11 NOTE — TELEPHONE ENCOUNTER
Set up rheumatology and let pt know    pt has elevated RF, CRP, and sed rate     let pt know I sent vit.  D to pharmacy and tell her it is one pill once a week

## 2022-05-13 RX ORDER — ERGOCALCIFEROL 1.25 MG/1
50000 CAPSULE ORAL WEEKLY
Qty: 12 CAPSULE | Refills: 1 | Status: SHIPPED | OUTPATIENT
Start: 2022-05-13

## 2022-05-15 LAB — CYCLIC CITRULLINATED PEPTIDE ANTIBODY IGG: 2.3 U/ML (ref 0–7)

## 2022-05-17 ENCOUNTER — OFFICE VISIT (OUTPATIENT)
Dept: SLEEP CENTER | Age: 62
End: 2022-05-17
Payer: MEDICAID

## 2022-05-17 ENCOUNTER — TELEPHONE (OUTPATIENT)
Dept: FAMILY MEDICINE CLINIC | Age: 62
End: 2022-05-17

## 2022-05-17 VITALS
RESPIRATION RATE: 20 BRPM | HEIGHT: 63 IN | WEIGHT: 284.3 LBS | BODY MASS INDEX: 50.38 KG/M2 | SYSTOLIC BLOOD PRESSURE: 130 MMHG | DIASTOLIC BLOOD PRESSURE: 87 MMHG | OXYGEN SATURATION: 97 % | HEART RATE: 65 BPM

## 2022-05-17 DIAGNOSIS — G47.33 OSA (OBSTRUCTIVE SLEEP APNEA): Primary | ICD-10-CM

## 2022-05-17 DIAGNOSIS — R03.0 ELEVATED BLOOD PRESSURE READING: ICD-10-CM

## 2022-05-17 DIAGNOSIS — E66.9 OBESITY, UNSPECIFIED CLASSIFICATION, UNSPECIFIED OBESITY TYPE, UNSPECIFIED WHETHER SERIOUS COMORBIDITY PRESENT: ICD-10-CM

## 2022-05-17 DIAGNOSIS — G47.61 PLMD (PERIODIC LIMB MOVEMENT DISORDER): ICD-10-CM

## 2022-05-17 PROCEDURE — G8417 CALC BMI ABV UP PARAM F/U: HCPCS | Performed by: STUDENT IN AN ORGANIZED HEALTH CARE EDUCATION/TRAINING PROGRAM

## 2022-05-17 PROCEDURE — 3017F COLORECTAL CA SCREEN DOC REV: CPT | Performed by: STUDENT IN AN ORGANIZED HEALTH CARE EDUCATION/TRAINING PROGRAM

## 2022-05-17 PROCEDURE — 99214 OFFICE O/P EST MOD 30 MIN: CPT | Performed by: STUDENT IN AN ORGANIZED HEALTH CARE EDUCATION/TRAINING PROGRAM

## 2022-05-17 PROCEDURE — 1036F TOBACCO NON-USER: CPT | Performed by: STUDENT IN AN ORGANIZED HEALTH CARE EDUCATION/TRAINING PROGRAM

## 2022-05-17 PROCEDURE — G8427 DOCREV CUR MEDS BY ELIG CLIN: HCPCS | Performed by: STUDENT IN AN ORGANIZED HEALTH CARE EDUCATION/TRAINING PROGRAM

## 2022-05-17 ASSESSMENT — SLEEP AND FATIGUE QUESTIONNAIRES
HOW LIKELY ARE YOU TO NOD OFF OR FALL ASLEEP WHILE SITTING INACTIVE IN A PUBLIC PLACE: 0
ESS TOTAL SCORE: 6
HOW LIKELY ARE YOU TO NOD OFF OR FALL ASLEEP WHILE LYING DOWN TO REST IN THE AFTERNOON WHEN CIRCUMSTANCES PERMIT: 3
HOW LIKELY ARE YOU TO NOD OFF OR FALL ASLEEP IN A CAR, WHILE STOPPED FOR A FEW MINUTES IN TRAFFIC: 0
HOW LIKELY ARE YOU TO NOD OFF OR FALL ASLEEP WHILE SITTING AND READING: 0
HOW LIKELY ARE YOU TO NOD OFF OR FALL ASLEEP WHILE SITTING QUIETLY AFTER LUNCH WITHOUT ALCOHOL: 3
HOW LIKELY ARE YOU TO NOD OFF OR FALL ASLEEP WHILE WATCHING TV: 0
HOW LIKELY ARE YOU TO NOD OFF OR FALL ASLEEP WHEN YOU ARE A PASSENGER IN A CAR FOR AN HOUR WITHOUT A BREAK: 0
HOW LIKELY ARE YOU TO NOD OFF OR FALL ASLEEP WHILE SITTING AND TALKING TO SOMEONE: 0

## 2022-05-17 NOTE — TELEPHONE ENCOUNTER
----- Message from Kenneth Herrera sent at 5/17/2022  2:09 PM EDT -----  Subject: Message to Provider    QUESTIONS  Information for Provider? Pt called to return a missed call. No message   was left and pt was unsure of what it may be about. WT was unsuccessful. Please return call to pt.   ---------------------------------------------------------------------------  --------------  CALL BACK INFO  What is the best way for the office to contact you? OK to leave message on   voicemail  Preferred Call Back Phone Number? [phone]  ---------------------------------------------------------------------------  --------------  SCRIPT ANSWERS  Relationship to Patient?  Self

## 2022-05-17 NOTE — PROGRESS NOTES
[facility] Sleep Medicine    Patient Name: Shari Espinosa  Age: 59 y.o.   : 1960    Date of Visit: 22        Review of Last Visit Summary:    The patient was last seen on Visit date not found for  Obstructive Sleep Apnea and Obesity. Interim History:     Sharla Hogan is a 59 y.o. female in office for follow up. Patient is doing well. She received her PAP device on . She was using it until the hose was damaged. She recently restarted it, and has had only minor issues with her mask. She goes to bed at 4am due to watching TV and wakes up at noon. She still gets 7-8 hours of sleep nightly. She does not report increased sleepiness due to this sleep schedule. Benefits: less tiredness during the day  DME: [address]    Compliance download report reviewed today by me demonstrated the following:    Dates 22-5/10/22    Settings - APAP 4-15 cwp  Days used -   >4 hours-  20%  AHI - 1.3  Leak - 22.5        Past Medical History:  Past Medical History:   Diagnosis Date    [facility] of [facility] 0241    [facility] complication after kidney stone surgery    History of kidney stones     Hx of cardiovascular stress test 2019    Lexiscan stress test    Hypertension     Hypothyroidism     Obesity     Thyroid disease     Type 2 diabetes mellitus without complication ([facility] [address]        Past Surgical History:        Procedure Laterality Date    BREAST SURGERY      CHOLECYSTECTOMY, LAPAROSCOPIC N/A 2020    LAPAROSCOPIC CHOLECYSTECTOMY WITH INTRAOPERATIVE CHOLANGIOGRAM performed by Madhav Quiroz MD at [place] [address], VAGINAL      JOINT REPLACEMENT Right 2019    knee  [facility]    KIDNEY STONE SURGERY      incisional removal       Allergies:  has No Known Allergies.   Social History:    Social History     Tobacco Use    Smoking status: Never Smoker    Smokeless tobacco: Never Used   Vaping Use    Vaping Use: Never used Substance Use Topics    Alcohol use: Not Currently     Comment: No caffeine    Drug use: No        Family History:       Problem Relation Age of Onset    Lung Cancer Mother         lung    COPD Father        Current Medications:    Current Outpatient Medications:     vitamin D (ERGOCALCIFEROL) 1.25 MG (88852 UT) CAPS capsule, Take 1 capsule by mouth once a week, Disp: 12 capsule, Rfl: 1    pravastatin (PRAVACHOL) 40 MG tablet, Take 1 tablet by mouth daily, Disp: 90 tablet, Rfl: 1    insulin glargine (LANTUS SOLOSTAR) 100 UNIT/ML injection pen, Inject 20 Units into the skin nightly, Disp: 18 mL, Rfl: 1    Insulin Pen Needle 31G X 5 MM MISC, 1 each by Does not apply route daily, Disp: 100 each, Rfl: 3    levothyroxine (SYNTHROID) 75 MCG tablet, take 1 tablet by mouth once daily, Disp: 90 tablet, Rfl: 1    lisinopril (PRINIVIL;ZESTRIL) 40 MG tablet, take 1 tablet by mouth once daily, Disp: 90 tablet, Rfl: 1    metoprolol succinate (TOPROL XL) 100 MG extended release tablet, take 1 tablet by mouth once daily, Disp: 90 tablet, Rfl: 1    citalopram (CELEXA) 20 MG tablet, take 1 tablet by mouth once daily, Disp: 90 tablet, Rfl: 1    busPIRone (BUSPAR) 5 MG tablet, Take 1 tablet by mouth 2 times daily as needed (anxiety    depression), Disp: 180 tablet, Rfl: 1    aspirin EC 81 MG EC tablet, Take 1 tablet by mouth daily, Disp: 90 tablet, Rfl: 1    tamsulosin (FLOMAX) 0.4 MG capsule, Take 0.4 mg by mouth daily, Disp: , Rfl:     ondansetron (ZOFRAN ODT) 8 MG TBDP disintegrating tablet, Take 1 tablet by mouth every 8 hours as needed for Nausea, Disp: 12 tablet, Rfl: 0    blood glucose monitor strips, Test 3 times a day & as needed for symptoms of irregular blood glucose. Dispense sufficient amount for indicated testing frequency plus additional to accommodate PRN testing needs.  ONE TOUCH ULTRA., Disp: 300 strip, Rfl: 1    albuterol sulfate  (90 Base) MCG/ACT inhaler, Inhale 2 puffs into the lungs every 6 hours as needed for Wheezing or Shortness of Breath, Disp: 1 Inhaler, Rfl: 3    vitamin D (ERGOCALCIFEROL) 1.25 MG (76487 UT) CAPS capsule, Take 1 capsule by mouth once a week (Patient not taking: Reported on 5/17/2022), Disp: 12 capsule, Rfl: 1          Objective:   PHYSICAL EXAM:    /87   Pulse 65   Resp 20   Ht 5' 3\" (1.6 m)   Wt 284 lb 4.8 oz (129 kg)   LMP  (LMP Unknown)   SpO2 97%   BMI 50.36 kg/m²       (Sleep ROS above)     Constitutional: no chills, no fever   Eyes: no blurred vision and no eyesight problems. ENT: no hoarseness and no sore throat. Cardiovascular: no chest pain,   Respiratory: no cough, no shortness of breath   Gastrointestinal:  no nausea,  no vomiting, no diarrhea. Musculoskeletal: no arthralgias, no back pain and no myalgias. Integumentary: no rashes or lacerations. Neurological:  no diplopia, no dizziness,  no headache, no memory changes,  and no tingling. Endocrine: No chills      Physical exam:  Gen: No acute distress. BMI of Body mass index is 50.36 kg/m². Eyes: PERRL. No sclera icterus. No conjunctival injection. ENT: No nasal/oral discharge. Pharynx clear. Neck: Trachea midline. No obvious mass. Neck circumference     Resp: No accessory muscle use. No crackles. No wheezes. No rhonchi. CV: Regular rate. Regular rhythm. No murmur or rub. Skin: Warm and dry. No bleeding observed   M/S: No cyanosis. No obvious joint deformity. Neuro: Awake. Alert. Moves all four extremities. Psych: Alert and oriented. No anxiety.        Sleep Medicine 5/17/2022 12/7/2021   Sitting and reading 0 1   Watching TV 0 1   Sitting, inactive in a public place (e.g. a theatre or a meeting) 0 0   As a passenger in a car for an hour without a break 0 0   Lying down to rest in the afternoon when circumstances permit 3 3   Sitting and talking to someone 0 0   Sitting quietly after a lunch without alcohol 3 3   In a car, while stopped for a few minutes in traffic 0 0

## 2022-05-17 NOTE — PATIENT INSTRUCTIONS
---------------------------------------------------  Blood Pressure Precautions    Your blood pressure Today:   BP Readings from Last 1 Encounters:   05/17/22 130/87       Your blood pressure at your last 3 visits:   BP Readings from Last 3 Encounters:   05/17/22 130/87   03/18/22 (!) 154/70   03/11/22 132/86        Your blood pressure was high today. Please discuss this with your primary care doctor as soon as possible. Elevated BP needs to be addressed. Please go to the Emergency room if you have any discomfort, new or worsening symptoms, or if any of the below symptoms present:       · Symptoms of a heart attack. These may include:  ? Chest pain or pressure, or a strange feeling in the chest.  ? Sweating. ? Shortness of breath. ? Nausea or vomiting. ? Pain, pressure, or a strange feeling in the back, neck, jaw, or upper belly or in one or both shoulders or arms. ? Lightheadedness or sudden weakness. ? A fast or irregular heartbeat. ? Dizziness   · Symptoms of a stroke. These may include:  ? Sudden numbness, tingling, weakness, or loss of movement in your face, arm, or leg, especially on only one side of your body. ? Sudden vision changes. ? Sudden trouble speaking. ? Facial droop  ? Sudden confusion or trouble understanding simple statements. ? Sudden problems with walking or balance. ? A sudden, severe headache that is different from past headaches. · You have severe back or belly pain.     ---------------------------------------------------    It was a pleasure seeing you today Sharla Mir! Summary of Today's Visit            Please call our sleep nurses to schedule a follow up at - [phone] option 2              1. Continue using your machine nightly     ------------Please bring your machine/Data Card to every visit. -------------     -Request all data downloads be sent to my nurse        2. Contact your DME company about supplies or issues with your machine.   As a general guideline, please replace your:  -CPAP mask every 3-6 months  -CPAP hose  every 3-6 months  -Filter every 2-4 weeks  -CPAP/BiPAP/ASV replacements every 5-7+ years     3. Continue healthy weight loss/stabilization, as this is recommended as a long-term intervention. 4. Please do not drive or operate machinery when you feel fatigued/sleepy/drowsy      5. Please try to sleep between 6-8 hours nightly with the CPAP mask    6. Please avoid sedatives, alcohol and caffeinated drinks at/near bed time. 7. Please call your supply (DME) company with any issues with your PAP device. Please call our office with any issues pertaining to the therapy.     ----Please note that complications of JOSE DANIEL if not treated can increase risk for: systemic hypertension, pulmonary hypertension, cardiovascular morbidities (heart attack and stroke), car accidents and all cause mortality. For general questions or scheduling issues, call my nurse at [phone] option [id number] [place] [address], [address] [place], [place] [phone] option 2  f- [phone]           ----------------------------------------------------------  Common Issues and Solutions     Pillow is dislodging mask - Consider getting a CPAP pillow or switching to a mask with the hose at the top. Dry Mouth - Adjust Humidity and/or try Biotene Gel. (Excessive leak can also cause this)     Leak - Please call your equipment provider  as they may need to adjust your mask. Difficulty tolerating the PAP mask - Contact your equipment company to try a different mask, we can order a \"mini sleep study\"with the sleep tech to try different masks/settings of pressure, also we have a sleep psychologist to help with anxiety related to wearing the PAP mask      ----------------------------------------------------------     For Questions and Concerns:    In case of difficulty with your PAP device or mask interface, please FIRST contact your Durable Medical Equipment company (The company who provides you the CPAP/BiPAP/ASV machine/supplies). If you need the number for any other DME company, please call my Nurse at [phone] option 2     For questions concerning your [place] appointment: Call [phone] option 2  SLEEP LAB SCHEDULING:        ----------------------------------------------------------     Helpful Web Sites: For patients with ALL SLEEP DISORDERS: American Academy of Sleep Medicine http://sleepeducation. org; or Conductiv: https://sleepfoundation. org  For patients with JOSE DANIEL: American Sleep Apnea Association: http://mckeon.org/. org  For patients with RLS: RLS Foundation: Arturo.es  For patients with INSOMNIA: https://www.covington.org/. org/articles/sleep/insomnia-causes-and-cures. htm  For patients with DEPRESSION: Gr8erMinds.com.WeVideo/depression            Learning About CPAP for Sleep Apnea  What is CPAP? CPAP/Bi-PAP is a small machine that you use at home every night while you sleep. It increases air pressure in your throat to keep your airway open. When you have sleep apnea, this can help you sleep better so you feel much better. CPAP stands for \"continuous positive airway pressure. \" Bi-PAP is a different PAP setting that allows for different pressures when you breathe in and out. The CPAP/Bi-PAP machine will have one of the following:  · A mask that covers your nose and mouth  · Prongs that fit into your nose  · A mask that covers your nose only, the most common type. This type is called NCPAP. The N stands for \"nasal.\"  Why is it done? CPAP is a common/effective treatment for obstructive sleep apnea. How does it help? · CPAP can help you have more normal sleep, so you feel less sleepy and more alert during the daytime through the treatment of sleep apnea. · CPAP may help keep heart failure or other heart problems from getting worse. · CPAP may help lower your blood pressure.   · If you use CPAP, your bed partner may also sleep better because you are snoring less. What are the side effects? Some people who use CPAP have:  · A dry or stuffy nose and a sore throat. · Irritated skin on the face. · Sore eyes. · Bloating. If you have any of these problems, work with your doctor to fix them. Here are some things you can try:  · Be sure the mask or nasal prongs fit well. · See if your doctor can adjust the pressure of your CPAP. · If your nose is dry, try a humidifier. If these things do not help, you might try a different type of machine. Some machines have air pressure that adjusts on its own. Others have air pressures that are different when you breathe in than when you breathe out. This may reduce discomfort caused by too much pressure in your nose. Where can you learn more? Go to https://TheraTorr Medicalpepiceweb.1World Online. org and sign in to your bVisual account. Enter Z875 in the [place] box to learn more about \"Learning About CPAP for Sleep Apnea. \"     If you do not have an account, please click on the \"Sign Up Now\" link. Current as of: February 24, 2020               Content Version: 12.5  © 2006-2020 Healthwise, Incorporated. Care instructions adapted under license by [facility] ([facility]). If you have questions about a medical condition or this instruction, always ask your healthcare professional. [place] [address] any warranty or liability for your use of this information. The general categories for the treatment of Sleep Apnea (including benefits and potential side effects) include:     1. Supportive Care  -Elevate the head of the bed   -Avoid sleeping on your back      2. Weight loss  -Start a healthy weight loss program  -Consider a referral to Weight Loss Medicine Clinic  -Benefits: Multiple health benefits  -Side Effects: Depends on treatment type     3. Oral Appliance \"Mouth Piece\"  (Mandibular Advancement Device)  -Benefits: CPAP alternative.  Decreased snoring, improved sleep quality, decreased sleepiness.  -Side Effects: teeth crowding, oral/dental pain. Regular visits with dentist are advised to watch for side effects. 4. Positive pressure therapy with a machine and a mask (CPAP or BiPAP)  Benefits: Decreased snoring, improved sleep quality, decreased sleepiness. Risks: Aerophagia (swallowing air), dry nose/nose bleeds, dry eyes (due to leak), skin sores/rash (due to mask)     5. Different kinds of surgeries, including nasal surgery, surgery of the throat/windpipe, and nerve stimulation therapy (INSPIRE). Benefits: CPAP alternative. Decreased snoring, improved sleep quality, decreased sleepiness.   Risks: Bleeding, infection, nerve damage

## 2022-05-18 ENCOUNTER — TELEPHONE (OUTPATIENT)
Dept: BREAST CENTER | Age: 62
End: 2022-05-18

## 2022-05-18 DIAGNOSIS — N63.0 BREAST MASS: ICD-10-CM

## 2022-05-18 DIAGNOSIS — N60.01 BILATERAL BREAST CYSTS: Primary | ICD-10-CM

## 2022-05-18 DIAGNOSIS — N60.02 BILATERAL BREAST CYSTS: Primary | ICD-10-CM

## 2022-05-18 NOTE — TELEPHONE ENCOUNTER
Per result note pt was referred to Rheumatology and vitamin D Rx was sent. This was discussed with pt via MyChart. Pt may schedule visit to discuss further if she wishes.     Electronically signed by Logan Chan MA on 5/18/22 at 8:29 AM EDT

## 2022-05-18 NOTE — TELEPHONE ENCOUNTER
Left another message in attempt to schedule patient for her appointment and imaging in June. She will need a clinical follow-up with a bilateral diagnostic mammogram and bilateral limited ultrasounds prior.

## 2022-05-26 ENCOUNTER — TELEPHONE (OUTPATIENT)
Dept: BARIATRICS/WEIGHT MGMT | Age: 62
End: 2022-05-26

## 2022-05-31 ENCOUNTER — OFFICE VISIT (OUTPATIENT)
Dept: PAIN MANAGEMENT | Age: 62
End: 2022-05-31
Payer: MEDICAID

## 2022-05-31 VITALS
RESPIRATION RATE: 16 BRPM | DIASTOLIC BLOOD PRESSURE: 78 MMHG | HEART RATE: 80 BPM | SYSTOLIC BLOOD PRESSURE: 116 MMHG | WEIGHT: 284 LBS | HEIGHT: 63 IN | BODY MASS INDEX: 50.32 KG/M2 | OXYGEN SATURATION: 96 % | TEMPERATURE: 96.9 F

## 2022-05-31 DIAGNOSIS — M51.36 DDD (DEGENERATIVE DISC DISEASE), LUMBAR: Primary | ICD-10-CM

## 2022-05-31 DIAGNOSIS — M47.817 LUMBOSACRAL SPONDYLOSIS WITHOUT MYELOPATHY: ICD-10-CM

## 2022-05-31 DIAGNOSIS — E66.9 OBESITY, UNSPECIFIED CLASSIFICATION, UNSPECIFIED OBESITY TYPE, UNSPECIFIED WHETHER SERIOUS COMORBIDITY PRESENT: ICD-10-CM

## 2022-05-31 DIAGNOSIS — M48.061 SPINAL STENOSIS OF LUMBAR REGION, UNSPECIFIED WHETHER NEUROGENIC CLAUDICATION PRESENT: ICD-10-CM

## 2022-05-31 PROCEDURE — G8427 DOCREV CUR MEDS BY ELIG CLIN: HCPCS | Performed by: ANESTHESIOLOGY

## 2022-05-31 PROCEDURE — 1036F TOBACCO NON-USER: CPT | Performed by: ANESTHESIOLOGY

## 2022-05-31 PROCEDURE — 99204 OFFICE O/P NEW MOD 45 MIN: CPT

## 2022-05-31 PROCEDURE — 99204 OFFICE O/P NEW MOD 45 MIN: CPT | Performed by: ANESTHESIOLOGY

## 2022-05-31 PROCEDURE — 3017F COLORECTAL CA SCREEN DOC REV: CPT | Performed by: ANESTHESIOLOGY

## 2022-05-31 PROCEDURE — G8417 CALC BMI ABV UP PARAM F/U: HCPCS | Performed by: ANESTHESIOLOGY

## 2022-05-31 RX ORDER — NABUMETONE 750 MG/1
750 TABLET, FILM COATED ORAL 2 TIMES DAILY PRN
Qty: 30 TABLET | Refills: 1 | Status: SHIPPED | OUTPATIENT
Start: 2022-05-31

## 2022-05-31 NOTE — PROGRESS NOTES
[address]        [address] [facility], [address] [facility] [address]      [phone]                  Consult Note      Patient:  SALOMÓN Serrano 1960    Date of Service:  22     Requesting Physician:  Maureen Jorgensen DO    Reason for Consult:      Patient presents with complaints of low back pain    HISTORY OF PRESENT ILLNESS:      Ms. Jovan Camarillo is a 59 y.o. female [facility] of  Chronic low back pain for over 5 yrs. Pain preeminently axial in nature. Pain is constant and is described as aching and throbbing. Pain does not radiate to both lower extremities. She  does not have numbness, tingling, weakness of the both lower extremities     Patient does not have bladder or bowel dysfunction. Alleviating factors include: rest.  Aggravating factors include: movement, walking. Pain causes functional limitations/ limits Adl's : Yes    Nursing notes and details of the pain history reviewed. Please see intake notes for details. Obesity +  H/o depression +    Previous treatments:   Physical Therapy/ HEP  : yes     Medications: - NSAID's : yes             - Membrane stabilizers : no            - Opioids : no            - Adjuvants or Others : yes    Spine surgeries: no    Interventional Pain procedures/ nerve blocks: no    Anticoagulation medications: no .    She has not been on herbal supplements. She is diabetic. H/O Smoking: no  H/O alcohol abuse : no  H/O Illicit drug use : no    Employment: retired    Imaging:   No recent spine images.     Past Medical History:   Diagnosis Date    History of blood transfusion 4533    at [facility] complication after kidney stone surgery    History of kidney stones     Hx of cardiovascular stress test 2019    Lexiscan stress test    Hypertension     Hypothyroidism     Kidney disease     Obesity     Sleep apnea     Thyroid disease     Type 2 diabetes mellitus without complication [facility])        Past Surgical History:   Procedure Laterality Date    BREAST SURGERY      CHOLECYSTECTOMY, LAPAROSCOPIC N/A 5/18/2020    LAPAROSCOPIC CHOLECYSTECTOMY WITH INTRAOPERATIVE CHOLANGIOGRAM performed by Rosalind Hart MD at [place] [address], VAGINAL      JOINT REPLACEMENT Right 2019    knee  [facility]    KIDNEY STONE SURGERY      incisional removal       Prior to Admission medications    Medication Sig Start Date End Date Taking? Authorizing Provider   vitamin D (ERGOCALCIFEROL) 1.25 MG (33886 UT) CAPS capsule Take 1 capsule by mouth once a week 5/13/22  Yes Esperanza P Catterlin, DO   pravastatin (PRAVACHOL) 40 MG tablet Take 1 tablet by mouth daily 4/5/22 10/2/22 Yes Esperanza P Catterlin, DO   insulin glargine (LANTUS SOLOSTAR) 100 UNIT/ML injection pen Inject 20 Units into the skin nightly 3/11/22 9/7/22 Yes Esperanza P Catterlin, DO   Insulin Pen Needle 31G X 5 MM MISC 1 each by Does not apply route daily 3/11/22  Yes Esperanza P Catterlin, DO   levothyroxine (SYNTHROID) 75 MCG tablet take 1 tablet by mouth once daily 3/11/22  Yes Esperanza P Catterlin, DO   lisinopril (PRINIVIL;ZESTRIL) 40 MG tablet take 1 tablet by mouth once daily 3/11/22  Yes Esperanza P Catterlin, DO   metoprolol succinate (TOPROL XL) 100 MG extended release tablet take 1 tablet by mouth once daily 3/11/22  Yes Esperanza P Catterlin, DO   citalopram (CELEXA) 20 MG tablet take 1 tablet by mouth once daily 3/11/22  Yes Esperanza P Catterlin, DO   busPIRone (BUSPAR) 5 MG tablet Take 1 tablet by mouth 2 times daily as needed (anxiety    depression) 1/7/22 7/6/22 Yes Esperanza P Catterlin, DO   blood glucose monitor strips Test 3 times a day & as needed for symptoms of irregular blood glucose. Dispense sufficient amount for indicated testing frequency plus additional to accommodate PRN testing needs.  ONE TOUCH ULTRA. 4/8/21  Yes Esperanza P Catterlin, DO   vitamin D (ERGOCALCIFEROL) 1.25 MG (43590 UT) CAPS capsule Take 1 capsule by mouth once a week 5/11/22   Jasminleatha Jose Alyssalin, DO       No Known Allergies    Social History     Socioeconomic History    Marital status:      Spouse name: Not on file    Number of children: Not on file    Years of education: Not on file    Highest education level: Not on file   Occupational History    Not on file   Tobacco Use    Smoking status: Never Smoker    Smokeless tobacco: Never Used   Vaping Use    Vaping Use: Never used   Substance and Sexual Activity    Alcohol use: Not Currently     Comment: No caffeine    Drug use: No    Sexual activity: Not on file   Other Topics Concern    Not on file   Social History Narrative    Not on file     Social Determinants of Health     Financial Resource Strain: Low Risk     Difficulty of Paying Living Expenses: Not hard at all   Food Insecurity: No Food Insecurity    Worried About [address] in the Last Year: Never true    [address] in the Last Year: Never true   Transportation Needs:     Lack of Transportation (Medical): Not on file    Lack of Transportation (Non-Medical):  Not on file   Physical Activity:     Days of Exercise per Week: Not on file    Minutes of Exercise per Session: Not on file   Stress:     Feeling of Stress : Not on file   Social Connections:     Frequency of Communication with Friends and Family: Not on file    Frequency of Social Gatherings with Friends and Family: Not on file    Attends Amish Services: Not on file    Active Member of Clubs or Organizations: Not on file    Attends Club or Organization Meetings: Not on file    Marital Status: Not on file   Intimate Partner Violence:     Fear of Current or Ex-Partner: Not on file    Emotionally Abused: Not on file    Physically Abused: Not on file    Sexually Abused: Not on file   Housing Stability:     Unable to Pay for Housing in the Last Year: Not on file    Number of Places Lived in the Last Year: Not on file    Unstable Housing in the Last Year: Not on file       Family History   Problem Relation Age of Onset    Lung Cancer Mother         lung    COPD Father        REVIEW OF SYSTEMS:     Patient specifically denies fever/chills, chest pain, shortness of breath, new bowel or bladder complaints. All other review of systems was negative. Review of Systems - Documented reviewed    PHYSICAL EXAMINATION:      /78   Pulse 80   Temp 96.9 °F (36.1 °C) (Infrared)   Resp 16   Ht 5' 3\" (1.6 m)   Wt 284 lb (128.8 kg)   LMP  (LMP Unknown)   SpO2 96%   BMI 50.31 kg/m²     General:      General appearance:  Pleasant and well-hydrated, in no distress and A & O x 3  Build: Morbidly obese  Function: Rises from seated position easily    HEENT:    Head:normocephalic, atraumatic  Pupils:regular, round, equal  Sclera: icterus absent    Lungs:    Breathing:normal breathing pattern     CVS:     RRR    Abdomen:    Shape:obese, non-distended and normal    Cervical spine:    Inspection:normal  Palpation:tenderness paravertebral muscles, tenderness trapezium, left, right : no  Range of motion:reduced    Thoracic spine:     Spine inspection:normal   Palpation:No tenderness over the midline and paraspinal area, bilaterally  Range of motion:normal in flexion, extension rotation bilateral and is not painful. Lumbar spine:    Spine inspection: Normal   Palpation: Tenderness paravertebral muscles Yes bilaterally  Range of motion: Decreased, flexion Decreased, Lateral bending, extension and rotation bilaterally reduced is somewhat painful.   Sacroiliac joint tenderness No bilaterally  KWABENA test: negative bilaterally  Gaenslen's test:negative bilaterally   Piriformis tenderness: negative bilaterally  SLR : negative bilaterally    Musculoskeletal:    Trigger points No     Extremities:    Tremors:None bilaterally upper and lower  Edema:no    Neurological:    Sensory: Normal to light touch     Motor: Right hip flexors 5/5  Left hip flexors 5/5                 Right Quadriceps 5/5          Left Quadriceps 5/5           Right Gastrocnemius 5/5    Left Gastrocnemius 5/5  Right Ant Tibialis 5/5  Left Ant Tibialis 5/5    Reflexes:    B/l le equal    Dermatology:    Skin:no rashes or lesions noted    Assessment/Plan:     Diagnosis Orders   1. DDD (degenerative disc disease), lumbar     2. Lumbosacral spondylosis without myelopathy     3. Spinal stenosis of lumbar region, unspecified whether neurogenic claudication present     4. Obesity, unspecified classification, unspecified obesity type, unspecified whether serious comorbidity present         59 y.o. female with h/o chronic low back pain in the lumbar area. Pain aggravated on ambulation and relieved by rest.  Denies significant lower extremity radicular component. Tried conservative treatment. No recent spine imaging. H/o obesity-is supposed to follow-up with bariatric medicine  H/o DM    PLAN:    Physical therapy    Xray LS spine    MRI LS spine WO    Relafen for as needed use. Use instructions reviewed. No OTC NSAID's while on Relafen. F/u after MRI. Discussed briefly regarding spine interventions. Continue follow-up with the bariatric medicine regarding weight loss recommendations. Is very interested in losing weight and is motivated. Counseling :    Patient encouraged to stay active and to watch/lose weight    Encouraged to continue Regular home exercise program as tolerated - stretching / strengthening. Treatment plan discussed with the patient including medication and procedure side effects. Controlled Substances Monitoring: [place]ARRS reviewed- not on chronic opioids. Jj Hahn MD    CC:    P[place] En[place],   [address] [facility] [address]     NOTE: The above documentation was prepared using voice recognition software.   Every attempt was made to ensure accuracy but there may be spelling, grammatical, and contextual errors.

## 2022-05-31 NOTE — PROGRESS NOTES
Patient:  Pat Alonzo,  1960  Date of Service:  22      Do you currently have any of the following:    Fever: No  Headache:  No  Cough: No  Shortness of breath: No  Exposed to anyone with these symptoms: No       Patient presents with complaints of lower back pain that started 5 years ago and has been getting unchanged. She states the pain began following No specific cause    Pain is constant and is described as sharp. She rates the pain as a 9/10 on her worst day , 1/10 on her best day, and a 5/10 on average on the VAS scale. Pain does not radiate to both lower extremities. She  does not have numbness, tingling, weakness of the both lower extremities. Alleviating factors include: rest.  Aggravating factors include:  movement, walking, standing. She states that the pain does not keep her from sleeping at night. She took her last dose of nothing at this time . She is not on NSAIDS and  is not on anticoagulation medications to include none and is managed by NA. Previous treatments: Physical Therapy. Personal Expectations from this treatment: increase activity and decrease pain    /78   Pulse 80   Temp 96.9 °F (36.1 °C) (Infrared)   Resp 16   Ht 5' 3\" (1.6 m)   Wt 284 lb (128.8 kg)   LMP  (LMP Unknown)   SpO2 96%   BMI 50.31 kg/m²     No LMP recorded (lmp unknown). Patient has had a hysterectomy.

## 2022-06-10 ENCOUNTER — OFFICE VISIT (OUTPATIENT)
Dept: FAMILY MEDICINE CLINIC | Age: 62
End: 2022-06-10
Payer: MEDICAID

## 2022-06-10 VITALS
HEIGHT: 63 IN | RESPIRATION RATE: 18 BRPM | DIASTOLIC BLOOD PRESSURE: 78 MMHG | OXYGEN SATURATION: 95 % | SYSTOLIC BLOOD PRESSURE: 122 MMHG | TEMPERATURE: 98.6 F | WEIGHT: 284 LBS | HEART RATE: 80 BPM | BODY MASS INDEX: 50.32 KG/M2

## 2022-06-10 DIAGNOSIS — Z79.4 TYPE 2 DIABETES MELLITUS WITHOUT COMPLICATION, WITH LONG-TERM CURRENT USE OF INSULIN (HCC): Primary | ICD-10-CM

## 2022-06-10 DIAGNOSIS — E55.9 VITAMIN D DEFICIENCY: ICD-10-CM

## 2022-06-10 DIAGNOSIS — E11.9 TYPE 2 DIABETES MELLITUS WITHOUT COMPLICATION, WITH LONG-TERM CURRENT USE OF INSULIN (HCC): Primary | ICD-10-CM

## 2022-06-10 DIAGNOSIS — L30.9 DERMATITIS: ICD-10-CM

## 2022-06-10 DIAGNOSIS — R06.02 SHORTNESS OF BREATH: ICD-10-CM

## 2022-06-10 DIAGNOSIS — N18.32 STAGE 3B CHRONIC KIDNEY DISEASE (HCC): ICD-10-CM

## 2022-06-10 LAB — HBA1C MFR BLD: 6.7 %

## 2022-06-10 PROCEDURE — 3017F COLORECTAL CA SCREEN DOC REV: CPT | Performed by: FAMILY MEDICINE

## 2022-06-10 PROCEDURE — G8427 DOCREV CUR MEDS BY ELIG CLIN: HCPCS | Performed by: FAMILY MEDICINE

## 2022-06-10 PROCEDURE — 99213 OFFICE O/P EST LOW 20 MIN: CPT | Performed by: FAMILY MEDICINE

## 2022-06-10 PROCEDURE — 83036 HEMOGLOBIN GLYCOSYLATED A1C: CPT | Performed by: FAMILY MEDICINE

## 2022-06-10 PROCEDURE — 3044F HG A1C LEVEL LT 7.0%: CPT | Performed by: FAMILY MEDICINE

## 2022-06-10 PROCEDURE — 1036F TOBACCO NON-USER: CPT | Performed by: FAMILY MEDICINE

## 2022-06-10 PROCEDURE — G8417 CALC BMI ABV UP PARAM F/U: HCPCS | Performed by: FAMILY MEDICINE

## 2022-06-10 PROCEDURE — 2022F DILAT RTA XM EVC RTNOPTHY: CPT | Performed by: FAMILY MEDICINE

## 2022-06-10 RX ORDER — ALBUTEROL SULFATE 90 UG/1
2 AEROSOL, METERED RESPIRATORY (INHALATION) EVERY 6 HOURS PRN
Qty: 18 G | Refills: 3 | Status: SHIPPED | OUTPATIENT
Start: 2022-06-10

## 2022-06-10 ASSESSMENT — ENCOUNTER SYMPTOMS
ABDOMINAL PAIN: 0
SINUS PRESSURE: 0
DIARRHEA: 0
CHOKING: 0
VOMITING: 0
EYE ITCHING: 0
ABDOMINAL DISTENTION: 0
BACK PAIN: 0
EYE DISCHARGE: 0
RHINORRHEA: 0
VOICE CHANGE: 0
CHEST TIGHTNESS: 0
BLOOD IN STOOL: 0
TROUBLE SWALLOWING: 0
NAUSEA: 0
SINUS PAIN: 0
CONSTIPATION: 0
WHEEZING: 0
COUGH: 0
SORE THROAT: 0

## 2022-06-10 NOTE — PROGRESS NOTES
Subjective:      Patient ID: Shahida Connor is a 59 y.o. female. HPI  History and Physical    Patient's Name/Date of Birth: Shahida Connor / 1960    Sharla Mir presents for evaluation of a Bilateral breast lesion    PCP: Gavin Joel DO. Gynecologist:none. Mimi Ruiz is a pleasant 59 y.o. female with a complex past medical history that includes severe/class III obesity, hypertension, hyperlipidemia, fatigue, chronic pain of the knees bilaterally, diabetes type 2,  chronic kidney disease, fatty liver, and vitamin D deficiency. She presents today for follow up of bilateral breast masses. Imaging to date @ [address] [place] [address] Dr Paulino:  2021 bilateral well-circumscribed masses, most likely cysts BI-RADS 0.  2021 bilateral breast US: Multiple bilateral cysts, including mildly complex cyst at the 12 o'clock position of the right breast.  Probably benign, BI-RADS 3  2021 bilateral DX mammogram/bilateral breast ultrasounds: Bilateral oval masses. Probably benign, BI-RADS 3.  2021 bilateral breast ultrasounds stable bilateral complicated cysts and masses are probably benign. BI-RADS 3. No personal history of breast cancer. Her breast cancer risk factors include gender and elevated BMI. Ashkenazi Latter day Ancestry: No.    OBSTETRIC RELATED HISTORY:  Age of menarche was 13. Age of menopause was 50. Patient denies hormonal therapy. Patient is . Age of first live birth was 20. Patient did not breast feed. CANCER SURVEILLANCE HISTORY:  Mammograms: Yes   Breast MRI's: No   Breast Biopsies: Yes  benign right breast; [place]'s. Colonoscopy: Yes   GI Polyps: No   EGD: No   Pelvic Exam: No   Pap Smear: No   Dermatology: No   Lung screening: no    Estimated body mass index is 50.31 kg/m² as calculated from the following:    Height as of 22: 5' 3\" (1.6 m). Weight as of 22: 284 lb (128.8 kg).   Bra Size: 36c    Because violence is so common, we ask all our patients: are you in a relationship or do you live with a person who threatens, hurts, or controls you:  Denies. Patient drinks no caffeinated beverages. Patient does not smoke cigarettes. Patient does not use recreational drugs. Occupation: retired      Review of Systems   Constitutional: Positive for fatigue (subjectively stable). Negative for activity change, appetite change, chills, fever and unexpected weight change. Sharla has multiple medical issues on this visit. HENT: Negative for congestion, postnasal drip, rhinorrhea, sinus pressure, sinus pain, sore throat, trouble swallowing and voice change. Eyes: Negative for discharge, itching and visual disturbance. Respiratory: Positive for shortness of breath (on exertion; chronic and stable). Negative for cough, choking, chest tightness and wheezing. Cardiovascular: Negative for chest pain, palpitations and leg swelling. Gastrointestinal: Negative for abdominal distention, abdominal pain, blood in stool, constipation, diarrhea, nausea and vomiting. Endocrine: Negative for cold intolerance and heat intolerance. Genitourinary: Negative for difficulty urinating, dysuria, frequency and hematuria. Musculoskeletal: Positive for myalgias (Most notable of the bilateral knees). Negative for arthralgias, back pain, gait problem, joint swelling, neck pain and neck stiffness. Allergic/Immunologic: Negative for environmental allergies and food allergies. Neurological: Negative for dizziness, seizures, syncope, speech difficulty, weakness, light-headedness and headaches. Hematological: Negative for adenopathy. Does not bruise/bleed easily. Psychiatric/Behavioral: Negative for agitation, confusion and decreased concentration. The patient is not nervous/anxious. Patient denies previous history of DVT/PE. Objective:   Physical Exam  Vitals and nursing note reviewed. Constitutional:       General: She is not in acute distress. Appearance: She is well-developed. She is obese. She is not diaphoretic. Comments: ECOG remains stable at 2 due to underlying comorbidities. Pleasant and conversant. HENT:      Head: Normocephalic and atraumatic. Mouth/Throat:      Pharynx: No oropharyngeal exudate. Eyes:      General: No scleral icterus. Right eye: No discharge. Left eye: No discharge. Conjunctiva/sclera: Conjunctivae normal.   Neck:      Thyroid: No thyromegaly. Vascular: No JVD. Trachea: No tracheal deviation. Cardiovascular:      Rate and Rhythm: Normal rate and regular rhythm. Heart sounds: No murmur heard. No friction rub. No gallop. Pulmonary:      Effort: Pulmonary effort is normal. No respiratory distress or retractions. Breath sounds: Normal breath sounds. No stridor. No wheezing or rales. Chest:      Chest wall: No mass, lacerations, deformity, swelling, tenderness or edema. Breasts: Breasts are symmetrical.      Right: No inverted nipple, mass, nipple discharge, skin change or tenderness. Left: No inverted nipple, mass, nipple discharge, skin change or tenderness. Comments: She has multiple, mobile cystic type densities bilaterally which appear clinically stable; no skin changes. No nipple discharge, no axillary adenopathy. Abdominal:      General: There is no distension. Palpations: Abdomen is soft. Tenderness: There is no abdominal tenderness. There is no guarding or rebound. Musculoskeletal:         General: No tenderness or deformity. Normal range of motion. Right shoulder: Normal.      Left shoulder: Normal.      Cervical back: Normal range of motion and neck supple. Right lower leg: Edema (Chronic bilateral edema of the lower extremities; stable.) present. Left lower leg: Edema present. Lymphadenopathy:      Cervical: No cervical adenopathy. Right cervical: No superficial, deep or posterior cervical adenopathy. Left cervical: No superficial, deep or posterior cervical adenopathy. Upper Body:      Right upper body: No pectoral adenopathy. Left upper body: No pectoral adenopathy. Skin:     General: Skin is warm and dry. Coloration: Skin is not pale. Findings: No erythema or rash. Neurological:      Mental Status: She is alert and oriented to person, place, and time. Coordination: Coordination normal.   Psychiatric:         Behavior: Behavior normal.         Thought Content: Thought content normal.         Judgment: Judgment normal.        Assessment:      59 y.o. pleasant female who's risk for breast cancer include gender and obesity. She presents again today without breast related complaints after a bilateral diagnostic mammogram and bilateral breast ultrasounds to establish stability of the bilateral breast masses. Initial imaging @ [facility]:  02/23/2021 bilateral well-circumscribed masses, most likely cysts BI-RADS 0.  03/04/2021 bilateral breast US: Multiple bilateral cysts, including mildly complex cyst at the 12 o'clock position of the right breast.  Probably benign, BI-RADS 3    Imaging to date @ [address] [place] [address] Dr Paulino:  06/17/2021 bilateral DX mammogram/bilateral breast ultrasounds: Bilateral oval masses. Probably benign, BI-RADS 3.  12/21/2021 bilateral breast ultrasounds stable bilateral complicated cysts and masses are probably benign. BI-RADS 3.  06/14/2022 bilateral DX mammogram/bilateral breast US: Multiple bilateral breast masses. Probably benign, BI-RADS 3. Clinically, she has bilateral mobile breast masses that are unchanged. She has not noticed any change in her breast self-exam.  We reviewed a BI-RADS Category 3 and I reviewed that I cannot guarantee she does not have cancer nor that she will not develop cancer in the future. However, her clinical exam and her imaging are stable. We will plan to see her back in 6 months with bilateral limited breast ultrasounds.   She was reminded of the importance of breast self-examination as well as the importance of calling in the event she should notice any breast changes. She is agreeable to the plan as outlined above. All questions were answered to her apparent satisfaction. [facility]-z[facility] Risk Evaluation  7 % lifetime risk (to age 80) of developing breast cancer. Plan:   1. Continue monthly breast/chest wall self examination; detailed instructions reviewed today. Bring any changes to your physician's attention. 2. Avoid alcohol. 3. Limit caffeine intake. 4. Wear a good support bra at all times. 5. Continue follow up with Primary Care/Gynecology and all specialties as directed. .  6. RTC 6 months with B/L breast US same day. During today's visit, face-to-face time 20 minutes, greater than 50% in counseling education and coordination of care. All questions were answered to her apparent satisfaction, and she is agreeable to the plan as outlined above.

## 2022-06-11 ENCOUNTER — HOSPITAL ENCOUNTER (OUTPATIENT)
Dept: GENERAL RADIOLOGY | Age: 62
Discharge: HOME | End: 2022-06-13
Payer: MEDICAID

## 2022-06-11 ENCOUNTER — HOSPITAL ENCOUNTER (OUTPATIENT)
Dept: MRI IMAGING | Age: 62
Discharge: HOME | End: 2022-06-13
Payer: MEDICAID

## 2022-06-11 ENCOUNTER — HOSPITAL ENCOUNTER (OUTPATIENT)
Age: 62
Discharge: HOME | End: 2022-06-13
Payer: MEDICAID

## 2022-06-11 DIAGNOSIS — M47.817 LUMBOSACRAL SPONDYLOSIS WITHOUT MYELOPATHY: ICD-10-CM

## 2022-06-11 DIAGNOSIS — M48.061 SPINAL STENOSIS OF LUMBAR REGION, UNSPECIFIED WHETHER NEUROGENIC CLAUDICATION PRESENT: ICD-10-CM

## 2022-06-11 DIAGNOSIS — M51.36 DDD (DEGENERATIVE DISC DISEASE), LUMBAR: ICD-10-CM

## 2022-06-11 DIAGNOSIS — M54.50 LOW BACK PAIN, UNSPECIFIED BACK PAIN LATERALITY, UNSPECIFIED CHRONICITY, UNSPECIFIED WHETHER SCIATICA PRESENT: ICD-10-CM

## 2022-06-11 PROCEDURE — 72148 MRI LUMBAR SPINE W/O DYE: CPT

## 2022-06-11 PROCEDURE — 72220 X-RAY EXAM SACRUM TAILBONE: CPT

## 2022-06-11 PROCEDURE — 72114 X-RAY EXAM L-S SPINE BENDING: CPT

## 2022-06-13 PROBLEM — L30.9 DERMATITIS: Status: ACTIVE | Noted: 2022-06-13

## 2022-06-13 PROBLEM — E11.9 TYPE 2 DIABETES MELLITUS WITHOUT COMPLICATION, WITH LONG-TERM CURRENT USE OF INSULIN (HCC): Status: ACTIVE | Noted: 2022-06-13

## 2022-06-13 PROBLEM — Z79.4 TYPE 2 DIABETES MELLITUS WITHOUT COMPLICATION, WITH LONG-TERM CURRENT USE OF INSULIN (HCC): Status: ACTIVE | Noted: 2022-06-13

## 2022-06-13 ASSESSMENT — ENCOUNTER SYMPTOMS
ALLERGIC/IMMUNOLOGIC NEGATIVE: 1
CHOKING: 0
RECTAL PAIN: 0
RHINORRHEA: 0
DIARRHEA: 0
FACIAL SWELLING: 0
VOMITING: 0
EYE DISCHARGE: 0
EYE REDNESS: 0
BACK PAIN: 1
WHEEZING: 0
VOICE CHANGE: 0
SORE THROAT: 0
EYE PAIN: 0
ANAL BLEEDING: 0
SHORTNESS OF BREATH: 0
EYE ITCHING: 0
PHOTOPHOBIA: 0
CHEST TIGHTNESS: 0
COLOR CHANGE: 0
CONSTIPATION: 0
ABDOMINAL DISTENTION: 0
SINUS PRESSURE: 0
COUGH: 0
BLOOD IN STOOL: 0
TROUBLE SWALLOWING: 0
SINUS PAIN: 0
STRIDOR: 0
ABDOMINAL PAIN: 0
EYES NEGATIVE: 1
NAUSEA: 0

## 2022-06-14 ENCOUNTER — OFFICE VISIT (OUTPATIENT)
Dept: BREAST CENTER | Age: 62
End: 2022-06-14
Payer: MEDICAID

## 2022-06-14 ENCOUNTER — HOSPITAL ENCOUNTER (OUTPATIENT)
Dept: GENERAL RADIOLOGY | Age: 62
Discharge: HOME | End: 2022-06-16
Payer: MEDICAID

## 2022-06-14 VITALS
RESPIRATION RATE: 18 BRPM | TEMPERATURE: 97.7 F | OXYGEN SATURATION: 96 % | HEIGHT: 63 IN | SYSTOLIC BLOOD PRESSURE: 136 MMHG | WEIGHT: 283 LBS | HEART RATE: 52 BPM | BODY MASS INDEX: 50.14 KG/M2 | DIASTOLIC BLOOD PRESSURE: 76 MMHG

## 2022-06-14 DIAGNOSIS — N63.0 BREAST MASS: ICD-10-CM

## 2022-06-14 DIAGNOSIS — N60.01 BILATERAL BREAST CYSTS: ICD-10-CM

## 2022-06-14 DIAGNOSIS — N60.02 BILATERAL BREAST CYSTS: ICD-10-CM

## 2022-06-14 PROBLEM — G89.29 CHRONIC PAIN OF BOTH KNEES: Status: RESOLVED | Noted: 2017-11-28 | Resolved: 2022-06-14

## 2022-06-14 PROBLEM — M25.562 CHRONIC PAIN OF BOTH KNEES: Status: RESOLVED | Noted: 2017-11-28 | Resolved: 2022-06-14

## 2022-06-14 PROBLEM — R00.2 PALPITATIONS: Status: RESOLVED | Noted: 2020-11-12 | Resolved: 2022-06-14

## 2022-06-14 PROBLEM — M25.561 CHRONIC PAIN OF BOTH KNEES: Status: RESOLVED | Noted: 2017-11-28 | Resolved: 2022-06-14

## 2022-06-14 PROCEDURE — G8417 CALC BMI ABV UP PARAM F/U: HCPCS | Performed by: NURSE PRACTITIONER

## 2022-06-14 PROCEDURE — 3017F COLORECTAL CA SCREEN DOC REV: CPT | Performed by: NURSE PRACTITIONER

## 2022-06-14 PROCEDURE — 1036F TOBACCO NON-USER: CPT | Performed by: NURSE PRACTITIONER

## 2022-06-14 PROCEDURE — 76642 ULTRASOUND BREAST LIMITED: CPT

## 2022-06-14 PROCEDURE — 99213 OFFICE O/P EST LOW 20 MIN: CPT | Performed by: NURSE PRACTITIONER

## 2022-06-14 PROCEDURE — 99213 OFFICE O/P EST LOW 20 MIN: CPT

## 2022-06-14 PROCEDURE — G0279 TOMOSYNTHESIS, MAMMO: HCPCS

## 2022-06-14 PROCEDURE — G8427 DOCREV CUR MEDS BY ELIG CLIN: HCPCS | Performed by: NURSE PRACTITIONER

## 2022-06-14 ASSESSMENT — ENCOUNTER SYMPTOMS: SHORTNESS OF BREATH: 1

## 2022-06-14 NOTE — PROGRESS NOTES
Earlene Kay is a 59 y.o. female. HPI/Chief C/O:  Chief Complaint   Patient presents with    Follow-up     Pt is here to discuss her lab results. No Known Allergies    This 59year old female presents for physical exam. Pt has type 2 DM, hypothyroid, stage 3b CKD, shortness of breath. Pt follows with pulmonology. A1C is 6.7. pt has rheumatology appointment 9/2022. ROS:  Review of Systems   Constitutional: Positive for fatigue. Negative for activity change, appetite change, chills, diaphoresis, fever and unexpected weight change. HENT: Negative. Negative for congestion, dental problem, drooling, ear discharge, ear pain, facial swelling, hearing loss, mouth sores, nosebleeds, postnasal drip, rhinorrhea, sinus pressure, sinus pain, sneezing, sore throat, tinnitus, trouble swallowing and voice change. Eyes: Negative. Negative for photophobia, pain, discharge, redness, itching and visual disturbance. Respiratory: Negative for cough, choking, chest tightness, shortness of breath, wheezing and stridor. Cardiovascular: Negative. Negative for chest pain, palpitations and leg swelling. Gastrointestinal: Negative for abdominal distention, abdominal pain, anal bleeding, blood in stool, constipation, diarrhea, nausea, rectal pain and vomiting. Endocrine: Negative for cold intolerance, heat intolerance, polydipsia, polyphagia and polyuria. Genitourinary: Positive for frequency and urgency. Negative for decreased urine volume, difficulty urinating, dysuria, flank pain, genital sores, hematuria, menstrual problem and pelvic pain. Musculoskeletal: Positive for arthralgias, back pain and myalgias. Negative for gait problem, joint swelling, neck pain and neck stiffness. Skin: Negative. Negative for color change, pallor, rash and wound. Allergic/Immunologic: Negative. Neurological: Negative.   Negative for dizziness, tremors, seizures, syncope, facial asymmetry, speech difficulty, weakness, light-headedness, numbness and headaches. Hematological: Negative. Negative for adenopathy. Does not bruise/bleed easily. Psychiatric/Behavioral: Positive for sleep disturbance. Negative for agitation, behavioral problems, confusion, decreased concentration, dysphoric mood, hallucinations, self-injury and suicidal ideas. The patient is nervous/anxious. The patient is not hyperactive. Past Medical/Surgical Hx;  Reviewed with patient      Diagnosis Date    History of blood transfusion 6547    at [facility] complication after kidney stone surgery    History of kidney stones     Hx of cardiovascular stress test 12/17/2019    Lexiscan stress test    Hypertension     Hypothyroidism     Kidney disease     Obesity     Sleep apnea     Thyroid disease     Type 2 diabetes mellitus without complication (HCC)      Past Surgical History:   Procedure Laterality Date    BREAST SURGERY      CHOLECYSTECTOMY, LAPAROSCOPIC N/A 5/18/2020    LAPAROSCOPIC CHOLECYSTECTOMY WITH INTRAOPERATIVE CHOLANGIOGRAM performed by Masoud Frances MD at [address] [place] (CERVIX STATUS UNKNOWN)      HYSTERECTOMY, VAGINAL      JOINT REPLACEMENT Right 2019    knee  [facility]    KIDNEY STONE SURGERY      incisional removal       Past Family Hx:  Reviewed with patient      Problem Relation Age of Onset    Lung Cancer Mother         lung    COPD Father        Social Hx:  Reviewed with patient  Social History     Tobacco Use    Smoking status: Never Smoker    Smokeless tobacco: Never Used   Substance Use Topics    Alcohol use: Not Currently     Comment: No caffeine       OBJECTIVE  /78   Pulse 80   Temp 98.6 °F (37 °C) (Temporal)   Resp 18   Ht 5' 3\" (1.6 m)   Wt 284 lb (128.8 kg)   LMP  (LMP Unknown)   SpO2 95%   Breastfeeding No   BMI 50.31 kg/m²     Problem List:  Sharla does not have any pertinent problems on file. PHYS EX:  Physical Exam  Vitals and nursing note reviewed. Constitutional:       General: She is not in acute distress. Appearance: Normal appearance. She is well-developed. She is obese. She is not ill-appearing, toxic-appearing or diaphoretic. Comments: Patient has morbid obesity. Patient instructed on low calorie, healthy ADA diet. HENT:      Head: Normocephalic and atraumatic. Nose: Nose normal. No congestion or rhinorrhea. Eyes:      General: No scleral icterus. Right eye: No discharge. Left eye: No discharge. Conjunctiva/sclera: Conjunctivae normal.      Pupils: Pupils are equal, round, and reactive to light. Neck:      Thyroid: No thyromegaly. Vascular: No carotid bruit or JVD. Trachea: No tracheal deviation. Cardiovascular:      Rate and Rhythm: Normal rate and regular rhythm. Pulses: Normal pulses. Heart sounds: Normal heart sounds. No murmur heard. No friction rub. No gallop. Pulmonary:      Effort: Pulmonary effort is normal. No respiratory distress. Breath sounds: Normal breath sounds. No stridor. No wheezing, rhonchi or rales. Chest:      Chest wall: No tenderness. Abdominal:      General: Bowel sounds are normal. There is no distension. Palpations: Abdomen is soft. There is no mass. Tenderness: There is no abdominal tenderness. There is no right CVA tenderness, left CVA tenderness, guarding or rebound. Hernia: No hernia is present. Musculoskeletal:         General: Tenderness present. No swelling, deformity or signs of injury. Cervical back: Normal range of motion and neck supple. No rigidity or tenderness. No muscular tenderness. Right lower leg: No edema. Left lower leg: No edema. Comments: Pain and decreased ROM multiple joints, and pamela. Knees. Lymphadenopathy:      Cervical: No cervical adenopathy. Skin:     General: Skin is warm. Coloration: Skin is not jaundiced or pale. Findings: No bruising, erythema, lesion or rash. Neurological:      General: No focal deficit present. Mental Status: She is alert and oriented to person, place, and time. Cranial Nerves: No cranial nerve deficit. Sensory: No sensory deficit. Motor: No weakness or abnormal muscle tone. Coordination: Coordination normal.      Gait: Gait normal.      Deep Tendon Reflexes: Reflexes are normal and symmetric. Reflexes normal.   Psychiatric:         Mood and Affect: Mood normal.         Behavior: Behavior normal.         Thought Content: Thought content normal.         Judgment: Judgment normal.         ASSESSMENT/PLAN  Sharla was seen today for follow-up. Diagnoses and all orders for this visit:    Type 2 diabetes mellitus without complication, with long-term current use of insulin ([facility])  -     POCT glycosylated hemoglobin (Hb A1C)  Controlled. Lantus, ace, statin, aspirin, ADA diet. Stage 3b chronic kidney disease (HCC)  NO NSAIDS. Shortness of breath  -     albuterol sulfate HFA (PROVENTIL;VENTOLIN;PROAIR) 108 (90 Base) MCG/ACT inhaler; Inhale 2 puffs into the lungs every 6 hours as needed for Wheezing or Shortness of Breath  Albuterol was started. Vitamin D deficiency  Stable. Vit. D.   Dermatitis  -     AFL - Gwen Delarosa MD, Dermatology, [facility]  -     hydrocortisone 2.5 % cream; Apply topically 2 times daily. Pt instructed if any worse go ED ASAP. Outpatient Encounter Medications as of 6/10/2022   Medication Sig Dispense Refill    hydrocortisone 2.5 % cream Apply topically 2 times daily.  1 each 0    albuterol sulfate HFA (PROVENTIL;VENTOLIN;PROAIR) 108 (90 Base) MCG/ACT inhaler Inhale 2 puffs into the lungs every 6 hours as needed for Wheezing or Shortness of Breath 18 g 3    nabumetone (RELAFEN) 750 MG tablet Take 1 tablet by mouth 2 times daily as needed for Pain 30 tablet 1    vitamin D (ERGOCALCIFEROL) 1.25 MG (66345 UT) CAPS capsule Take 1 capsule by mouth once a week 12 capsule 1    pravastatin (PRAVACHOL) 40 MG tablet Take 1 tablet by mouth daily 90 tablet 1    insulin glargine (LANTUS SOLOSTAR) 100 UNIT/ML injection pen Inject 20 Units into the skin nightly 18 mL 1    Insulin Pen Needle 31G X 5 MM MISC 1 each by Does not apply route daily 100 each 3    levothyroxine (SYNTHROID) 75 MCG tablet take 1 tablet by mouth once daily 90 tablet 1    lisinopril (PRINIVIL;ZESTRIL) 40 MG tablet take 1 tablet by mouth once daily 90 tablet 1    metoprolol succinate (TOPROL XL) 100 MG extended release tablet take 1 tablet by mouth once daily 90 tablet 1    citalopram (CELEXA) 20 MG tablet take 1 tablet by mouth once daily 90 tablet 1    busPIRone (BUSPAR) 5 MG tablet Take 1 tablet by mouth 2 times daily as needed (anxiety    depression) 180 tablet 1    blood glucose monitor strips Test 3 times a day & as needed for symptoms of irregular blood glucose. Dispense sufficient amount for indicated testing frequency plus additional to accommodate PRN testing needs. ONE TOUCH ULTRA. 300 strip 1    vitamin D (ERGOCALCIFEROL) 1.25 MG (80066 UT) CAPS capsule Take 1 capsule by mouth once a week (Patient not taking: Reported on 6/10/2022) 12 capsule 1     No facility-administered encounter medications on file as of 6/10/2022. Return in about 3 months (around 9/10/2022).         Reviewed recent labs related to Sharla's current problems      Discussed importance of regular Health Maintenance follow up  Health Maintenance   Topic    COVID-19 Vaccine (1)    Pneumococcal 0-64 years Vaccine (1 - PCV)    Diabetic foot exam     HIV screen     Shingles vaccine (1 of 2)    Diabetic retinal exam     Flu vaccine (Season Ended)    Lipids     Depression Monitoring     A1C test (Diabetic or Prediabetic)     Breast cancer screen     DTaP/Tdap/Td vaccine (3 - Td or Tdap)    Colorectal Cancer Screen     Hepatitis C screen     Hepatitis A vaccine     Hib vaccine     Meningococcal (ACWY) vaccine

## 2022-06-15 ENCOUNTER — TELEPHONE (OUTPATIENT)
Dept: BARIATRICS/WEIGHT MGMT | Age: 62
End: 2022-06-15

## 2022-06-15 ENCOUNTER — TELEPHONE (OUTPATIENT)
Dept: PHYSICAL THERAPY | Age: 62
End: 2022-06-15

## 2022-06-15 NOTE — TELEPHONE ENCOUNTER
Date: 6/15/2022       Patient Name: Shahida Connor  : 1960      MRN: [id number]    Patient canceled appointment scheduled for today  at [phone] due to no reason given to therapist .    Primus List, PT

## 2022-07-01 ENCOUNTER — OFFICE VISIT (OUTPATIENT)
Dept: PAIN MANAGEMENT | Age: 62
End: 2022-07-01
Payer: MEDICAID

## 2022-07-01 ENCOUNTER — PREP FOR PROCEDURE (OUTPATIENT)
Dept: PAIN MANAGEMENT | Age: 62
End: 2022-07-01

## 2022-07-01 VITALS
WEIGHT: 283 LBS | DIASTOLIC BLOOD PRESSURE: 78 MMHG | SYSTOLIC BLOOD PRESSURE: 120 MMHG | HEART RATE: 72 BPM | BODY MASS INDEX: 50.14 KG/M2 | OXYGEN SATURATION: 91 % | TEMPERATURE: 97.7 F | HEIGHT: 63 IN

## 2022-07-01 DIAGNOSIS — M47.817 LUMBOSACRAL SPONDYLOSIS WITHOUT MYELOPATHY: ICD-10-CM

## 2022-07-01 DIAGNOSIS — M51.36 DDD (DEGENERATIVE DISC DISEASE), LUMBAR: Primary | ICD-10-CM

## 2022-07-01 DIAGNOSIS — E66.9 OBESITY, UNSPECIFIED CLASSIFICATION, UNSPECIFIED OBESITY TYPE, UNSPECIFIED WHETHER SERIOUS COMORBIDITY PRESENT: ICD-10-CM

## 2022-07-01 PROBLEM — M51.369 DDD (DEGENERATIVE DISC DISEASE), LUMBAR: Status: ACTIVE | Noted: 2022-07-01

## 2022-07-01 PROCEDURE — 99213 OFFICE O/P EST LOW 20 MIN: CPT | Performed by: ANESTHESIOLOGY

## 2022-07-01 PROCEDURE — G8417 CALC BMI ABV UP PARAM F/U: HCPCS | Performed by: ANESTHESIOLOGY

## 2022-07-01 PROCEDURE — 3017F COLORECTAL CA SCREEN DOC REV: CPT | Performed by: ANESTHESIOLOGY

## 2022-07-01 PROCEDURE — G8427 DOCREV CUR MEDS BY ELIG CLIN: HCPCS | Performed by: ANESTHESIOLOGY

## 2022-07-01 PROCEDURE — 1036F TOBACCO NON-USER: CPT | Performed by: ANESTHESIOLOGY

## 2022-07-01 PROCEDURE — 99214 OFFICE O/P EST MOD 30 MIN: CPT | Performed by: ANESTHESIOLOGY

## 2022-07-01 RX ORDER — SODIUM CHLORIDE 0.9 % (FLUSH) 0.9 %
5-40 SYRINGE (ML) INJECTION EVERY 12 HOURS SCHEDULED
Status: CANCELLED | OUTPATIENT
Start: 2022-07-01

## 2022-07-01 RX ORDER — SODIUM CHLORIDE 0.9 % (FLUSH) 0.9 %
5-40 SYRINGE (ML) INJECTION PRN
Status: CANCELLED | OUTPATIENT
Start: 2022-07-01

## 2022-07-01 RX ORDER — SODIUM CHLORIDE 9 MG/ML
INJECTION, SOLUTION INTRAVENOUS PRN
Status: CANCELLED | OUTPATIENT
Start: 2022-07-01

## 2022-07-01 NOTE — H&P (VIEW-ONLY)
223 [facility], [address] [place] [address] Johann  [phone]    Follow up Note      Terry Miles     Date of Visit:  7/1/2022    CC:  Patient presents for follow up   Chief Complaint   Patient presents with    Back Pain       HPI:  Chronic low back pain for over 5 yrs. Pain causes functional limitations/ limits Adl's : Yes     Nursing notes and details of the pain history reviewed. Please see intake notes for details.     Obesity +  H/o depression +    Previous treatments:   Physical Therapy/ HEP  : yes      Medications: - NSAID's : yes                        - Membrane stabilizers : no                       - Opioids : no                       - Adjuvants or Others : yes     Spine surgeries: no     Anticoagulation medications: no .     She has not been on herbal supplements.       She is diabetic.     H/O Smoking: no  H/O alcohol abuse : no  H/O Illicit drug use : no     Employment: retired     Imaging:     MRI of the lumbar spine 6/11/2022:  FINDINGS:   BONES/ALIGNMENT: There is normal alignment of the spine. The vertebral body   heights are maintained. The bone marrow signal appears unremarkable.       SPINAL CORD: The conus terminates normally.       SOFT TISSUES: No paraspinal mass identified.       L1-L2: Minimal disc bulge.  No significant central canal, lateral recess or   neural foraminal stenoses.       L2-L3: Moderate loss of disc height with a small disc osteophyte complex.  No   significant central canal or lateral recess stenosis.  Mild left neural   foraminal stenosis.       L3-L4: Disc desiccation with a small left-sided disc bulge.  Small annular   tear in the left neural foraminal segment of the disc.  Mild facet   hypertrophy. No significant central canal or lateral recess stenosis.  Mild   left neural foraminal stenosis.  Small effusion in the right facet joint.       L4-L5: Disc desiccation with a small disc bulge.  Moderate facet hypertrophy.    Mild central canal and lateral recess stenoses.  Moderate right and mild left   neural foraminal stenoses.  Small effusion in the right facet joint.       L5-S1: Disc desiccation with a small disc bulge.  Mild facet hypertrophy.  No   significant central canal or lateral recess stenosis.  Moderate neural   foraminal stenoses.           Impression   1.  No fracture or bony destructive lesion. 2. Small disc bulges at multiple levels, associated with a small annular tear   at L3-4.   3. Mild central canal stenosis at L4-5. 4.  Multilevel neural foraminal stenoses, worst (moderate) at the right L4-5   and bilateral L5-S1 levels. 5. Small effusions in the right L3-4 and L4-5 facet joints are likely due to   arthrosis. X-ray lumbar spine 6/11/2022:     FINDINGS:   Postop changes are noted in the abdomen.  There are 5 lumbar type vertebral   bodies.  Severe facet arthropathy is noted at L3-4, L4-5 and L5-S1.  Minimal   grade 1 anterolisthesis of 4 on 5.  No acute fracture or subluxation   detected.  1-2 mm of anterior translation of L4 on L5 with flexion. Degenerative disc disease is noted at L2-3.           Impression   1.  Severe facet arthropathy in the lower lumbar spine.  1-2 mm of anterior   translation with flexion of the lumbar spine at L4-5.  There is grade 1   anterolisthesis of 4 on 5 likely due to facet arthropathy.       2.  Degenerative disc disease at L2-3.       3.  Osteopenia.            Impression   No acute osseous abnormality.          x-ray sacrum coccyx 6/11/2022:     Impression   No acute osseous abnormality.           OARRS report[de-identified] reviewed.     Past Medical History:   Diagnosis Date    History of blood transfusion 5940    at [facility] complication after kidney stone surgery    History of kidney stones     Hx of cardiovascular stress test 12/17/2019    Lexiscan stress test    Hypertension     Hypothyroidism     Kidney disease     Obesity     Sleep apnea     Thyroid disease     Type 2 diabetes mellitus without complication [facility])        Past Surgical History:   Procedure Laterality Date    BREAST SURGERY      CHOLECYSTECTOMY, LAPAROSCOPIC N/A 5/18/2020    LAPAROSCOPIC CHOLECYSTECTOMY WITH INTRAOPERATIVE CHOLANGIOGRAM performed by Sarah Harmon MD at [address] (CERVIX STATUS UNKNOWN)      HYSTERECTOMY, VAGINAL      JOINT REPLACEMENT Right 2019    knee  [facility]    KIDNEY STONE SURGERY      incisional removal       Prior to Admission medications    Medication Sig Start Date End Date Taking? Authorizing Provider   blood glucose monitor strips Test 3 times a day & as needed for symptoms of irregular blood glucose. Dispense sufficient amount for indicated testing frequency plus additional to accommodate PRN testing needs. ONE TOUCH ULTRA. 6/22/22  Yes Esperanza P Catterlin, DO   clotrimazole-betamethasone (LOTRISONE) 1-0.05 % cream Apply topically 3 times daily. 6/22/22  Yes Esperanza P Catterlin, DO   hydrocortisone 2.5 % cream Apply topically 2 times daily.  6/10/22  Yes Esperanza P Catterlin, DO   albuterol sulfate HFA (PROVENTIL;VENTOLIN;PROAIR) 108 (90 Base) MCG/ACT inhaler Inhale 2 puffs into the lungs every 6 hours as needed for Wheezing or Shortness of Breath 6/10/22  Yes Esperanza P Catterlin, DO   nabumetone (RELAFEN) 750 MG tablet Take 1 tablet by mouth 2 times daily as needed for Pain 5/31/22  Yes Aparna Enciso MD   vitamin D (ERGOCALCIFEROL) 1.25 MG (30906 UT) CAPS capsule Take 1 capsule by mouth once a week 5/13/22  Yes Esperanza P Catterlin, DO   pravastatin (PRAVACHOL) 40 MG tablet Take 1 tablet by mouth daily 4/5/22 10/2/22 Yes Esperanza P Catterlin, DO   insulin glargine (LANTUS SOLOSTAR) 100 UNIT/ML injection pen Inject 20 Units into the skin nightly 3/11/22 9/7/22 Yes Esperanza P Catterlin, DO   Insulin Pen Needle 31G X 5 MM MISC 1 each by Does not apply route daily 3/11/22  Yes Esperanza P Catterlin, DO   levothyroxine (SYNTHROID) 75 MCG tablet take 1 tablet by mouth once daily 3/11/22  Yes Esperanza P Catterlin, DO   lisinopril (PRINIVIL;ZESTRIL) 40 MG tablet take 1 tablet by mouth once daily 3/11/22  Yes Esperanza P Catterlin, DO   metoprolol succinate (TOPROL XL) 100 MG extended release tablet take 1 tablet by mouth once daily 3/11/22  Yes [place] Caitlyn Catterlin, DO   citalopram (CELEXA) 20 MG tablet take 1 tablet by mouth once daily 3/11/22  Yes Esperanza P Catterlin, DO   vitamin D (ERGOCALCIFEROL) 1.25 MG (97689 UT) CAPS capsule Take 1 capsule by mouth once a week 5/11/22   Lovely BRAN Catterlin, DO   busPIRone (BUSPAR) 5 MG tablet Take 1 tablet by mouth 2 times daily as needed (anxiety    depression) 1/7/22 7/6/22  Lovely Hernadezterlin, DO       No Known Allergies    Social History     Socioeconomic History    Marital status:      Spouse name: Not on file    Number of children: Not on file    Years of education: Not on file    Highest education level: Not on file   Occupational History    Not on file   Tobacco Use    Smoking status: Never Smoker    Smokeless tobacco: Never Used   Vaping Use    Vaping Use: Never used   Substance and Sexual Activity    Alcohol use: Not Currently     Comment: No caffeine    Drug use: No    Sexual activity: Not on file   Other Topics Concern    Not on file   Social History Narrative    Not on file     Social Determinants of Health     Financial Resource Strain: Low Risk     Difficulty of Paying Living Expenses: Not hard at all   Food Insecurity: No Food Insecurity    Worried About Running Out of Food in the Last Year: Never true    Wojciech of Food in the Last Year: Never true   Transportation Needs:     Lack of Transportation (Medical): Not on file    Lack of Transportation (Non-Medical):  Not on file   Physical Activity:     Days of Exercise per Week: Not on file    Minutes of Exercise per Session: Not on file   Stress:     Feeling of Stress : Not on file   Social Connections:     Frequency of Communication with Friends and Family: Not on file    Frequency of Social Gatherings with Friends and Family: Not on file    Attends Advent Services: Not on file    Active Member of Clubs or Organizations: Not on file    Attends Club or Organization Meetings: Not on file    Marital Status: Not on file   Intimate Partner Violence:     Fear of Current or Ex-Partner: Not on file    Emotionally Abused: Not on file    Physically Abused: Not on file    Sexually Abused: Not on file   Housing Stability:     Unable to Pay for Housing in the Last Year: Not on file    Number of [place] in the Last Year: Not on file    Unstable Housing in the Last Year: Not on file       Family History   Problem Relation Age of Onset    Lung Cancer Mother         lung    COPD Father        REVIEW OF SYSTEMS:     Sharla denies fever/chills, chest pain, shortness of breath, new bowel or bladder complaints. All other review of systems was negative. PHYSICAL EXAMINATION:      /78   Pulse 72   Temp 97.7 °F (36.5 °C) (Infrared)   Ht 5' 3\" (1.6 m)   Wt 283 lb (128.4 kg)   LMP  (LMP Unknown)   SpO2 91%   BMI 50.13 kg/m²      General:       General appearance:  Pleasant and well-hydrated, in no distress and A & O x 3  Build: Morbidly obese  Function: Rises from seated position easily     HEENT:     Head:normocephalic, atraumatic     Lungs:     Breathing:normal breathing pattern      CVS:     RRR     Abdomen:     Shape:obese, non-distended and normal     Cervical spine:     Inspection:normal     Thoracic spine:                Spine inspection:normal      Lumbar spine:     Spine inspection: Normal   Palpation: Tenderness paravertebral muscles Yes bilaterally  Range of motion: Decreased, flexion Decreased, Lateral bending, extension and rotation bilaterally reduced is somewhat painful.   Lumbar facte loading +  Sacroiliac joint tenderness No bilaterally  KWABENA test: negative bilaterally  Gaenslen's test:negative bilaterally   Piriformis tenderness: negative bilaterally  SLR : negative bilaterally     Musculoskeletal:     Trigger points No      Extremities:     Tremors:None bilaterally upper and lower  Edema:no     Neurological:     Sensory: Normal to light touch      Motor:   Right hip flexors 5/5  Left hip flexors 5/5                 Right Quadriceps 5/5          Left Quadriceps 5/5           Right Gastrocnemius 5/5    Left Gastrocnemius 5/5  Right Ant Tibialis 5/5  Left Ant Tibialis 5/5     Reflexes:    B/l le equal     Dermatology:     Skin:no rashes or lesions noted    Assessment/Plan:       Diagnosis Orders   1. DDD (degenerative disc disease), lumbar      2. Lumbosacral spondylosis without myelopathy      3. Spinal stenosis of lumbar region, unspecified whether neurogenic claudication present      4. Obesity, unspecified classification, unspecified obesity type, unspecified whether serious comorbidity present            59 y.o.  female with h/o chronic low back pain in the lumbar area. Pain aggravated on ambulation and relieved by rest.  Tried conservative treatment. H/o obesity-is supposed to follow-up with bariatric medicine  H/o DM     Has undergone X ray / MRI of LS spine and is here to review the results and treatment plan. Xray LS spine- reviewed     MRI LS spine - reviewed personally and discussed the findings.     PLAN:     Right L3 and left L4 TFESI under fluoroscopy. RBA discussed. Also has facet pain. If facet pain persists, after CLYDE, consider facet interventions.     Relafen for as needed use. Use instructions reviewed. No OTC NSAID's while on Relafen.      Continue follow-up with the bariatric medicine regarding weight loss recommendations.   Is very interested in losing weight and is motivated.     Counseling :     Patient encouraged to stay active and to watch/lose weight     Encouraged to continue Regular home exercise program as tolerated - stretching / strengthening.     Treatment plan discussed with the patient including medication and procedure side effects.     Controlled Substances Monitoring: OARRS reviewed- not on chronic opioids.     Maryellen Woo MD

## 2022-07-01 NOTE — PROGRESS NOTES
Do you currently have any of the following:    Fever: No  Headache:  No  Cough: No  Shortness of breath: No  Exposed to anyone with these symptoms: No                                                                                                                Sharla Mir presents to the [address] [address] on 7/1/2022. Sharla is complaining of pain in back. The pain is constant. The pain is described as shooting. Pain is rated on her best day at a 8, on her worst day at a 8, and on average at a 8 on the VAS scale. She has not taken any pain medication for some time. Sharla does not have issues with constipation. Any procedures since your last visit: No.    She is not on NSAIDS and  is not on anticoagulation medications to include none and is managed by none. Pacemaker or defibrillator: No.    Medication Contract and Consent for Opioid Use Documents Filed      No documents found                   Temp 97.7 °F (36.5 °C) (Infrared)   Ht 5' 3\" (1.6 m)   Wt 283 lb (128.4 kg)   LMP  (LMP Unknown)   BMI 50.13 kg/m²      No LMP recorded (lmp unknown). Patient has had a hysterectomy.

## 2022-07-01 NOTE — PROGRESS NOTES
223 [facility], [address] [place] [address] Johann  [phone]    Follow up Note      Darien Aid     Date of Visit:  7/1/2022    CC:  Patient presents for follow up   Chief Complaint   Patient presents with    Back Pain       HPI:  Chronic low back pain for over 5 yrs. Pain causes functional limitations/ limits Adl's : Yes     Nursing notes and details of the pain history reviewed. Please see intake notes for details.     Obesity +  H/o depression +    Previous treatments:   Physical Therapy/ HEP  : yes      Medications: - NSAID's : yes                        - Membrane stabilizers : no                       - Opioids : no                       - Adjuvants or Others : yes     Spine surgeries: no     Anticoagulation medications: no .     She has not been on herbal supplements.       She is diabetic.     H/O Smoking: no  H/O alcohol abuse : no  H/O Illicit drug use : no     Employment: retired     Imaging:     MRI of the lumbar spine 6/11/2022:  FINDINGS:   BONES/ALIGNMENT: There is normal alignment of the spine. The vertebral body   heights are maintained. The bone marrow signal appears unremarkable.       SPINAL CORD: The conus terminates normally.       SOFT TISSUES: No paraspinal mass identified.       L1-L2: Minimal disc bulge.  No significant central canal, lateral recess or   neural foraminal stenoses.       L2-L3: Moderate loss of disc height with a small disc osteophyte complex.  No   significant central canal or lateral recess stenosis.  Mild left neural   foraminal stenosis.       L3-L4: Disc desiccation with a small left-sided disc bulge.  Small annular   tear in the left neural foraminal segment of the disc.  Mild facet   hypertrophy. No significant central canal or lateral recess stenosis.  Mild   left neural foraminal stenosis.  Small effusion in the right facet joint.       L4-L5: Disc desiccation with a small disc bulge.  Moderate facet hypertrophy.    Mild diabetes mellitus without complication [facility])        Past Surgical History:   Procedure Laterality Date    BREAST SURGERY      CHOLECYSTECTOMY, LAPAROSCOPIC N/A 5/18/2020    LAPAROSCOPIC CHOLECYSTECTOMY WITH INTRAOPERATIVE CHOLANGIOGRAM performed by Josiah Loco MD at [address] (CERVIX STATUS UNKNOWN)      HYSTERECTOMY, VAGINAL      JOINT REPLACEMENT Right 2019    knee  [facility]    KIDNEY STONE SURGERY      incisional removal       Prior to Admission medications    Medication Sig Start Date End Date Taking? Authorizing Provider   blood glucose monitor strips Test 3 times a day & as needed for symptoms of irregular blood glucose. Dispense sufficient amount for indicated testing frequency plus additional to accommodate PRN testing needs. ONE TOUCH ULTRA. 6/22/22  Yes Esperanza P Catterlin, DO   clotrimazole-betamethasone (LOTRISONE) 1-0.05 % cream Apply topically 3 times daily. 6/22/22  Yes Esperanza P Catterlin, DO   hydrocortisone 2.5 % cream Apply topically 2 times daily.  6/10/22  Yes Esperanza P Catterlin, DO   albuterol sulfate HFA (PROVENTIL;VENTOLIN;PROAIR) 108 (90 Base) MCG/ACT inhaler Inhale 2 puffs into the lungs every 6 hours as needed for Wheezing or Shortness of Breath 6/10/22  Yes Esperanza P Catterlin, DO   nabumetone (RELAFEN) 750 MG tablet Take 1 tablet by mouth 2 times daily as needed for Pain 5/31/22  Yes Rosalia Gardner MD   vitamin D (ERGOCALCIFEROL) 1.25 MG (91899 UT) CAPS capsule Take 1 capsule by mouth once a week 5/13/22  Yes Esperanza P Catterlin, DO   pravastatin (PRAVACHOL) 40 MG tablet Take 1 tablet by mouth daily 4/5/22 10/2/22 Yes Esperanza P Catterlin, DO   insulin glargine (LANTUS SOLOSTAR) 100 UNIT/ML injection pen Inject 20 Units into the skin nightly 3/11/22 9/7/22 Yes Esperanza P Catterlin, DO   Insulin Pen Needle 31G X 5 MM MISC 1 each by Does not apply route daily 3/11/22  Yes Esperanza P Catterlin, DO   levothyroxine (SYNTHROID) 75 MCG tablet take 1 tablet by mouth once daily 3/11/22  Yes Esperanza P Catterlin, DO   lisinopril (PRINIVIL;ZESTRIL) 40 MG tablet take 1 tablet by mouth once daily 3/11/22  Yes Esperanza P Catterlin, DO   metoprolol succinate (TOPROL XL) 100 MG extended release tablet take 1 tablet by mouth once daily 3/11/22  Yes Norma Orris Catterlin, DO   citalopram (CELEXA) 20 MG tablet take 1 tablet by mouth once daily 3/11/22  Yes Esperanza P Catterlin, DO   vitamin D (ERGOCALCIFEROL) 1.25 MG (03715 UT) CAPS capsule Take 1 capsule by mouth once a week 5/11/22   Gurwinder Rana P Catterlin, DO   busPIRone (BUSPAR) 5 MG tablet Take 1 tablet by mouth 2 times daily as needed (anxiety    depression) 1/7/22 7/6/22  Gurwinder Rana P Catterlin, DO       No Known Allergies    Social History     Socioeconomic History    Marital status:      Spouse name: Not on file    Number of children: Not on file    Years of education: Not on file    Highest education level: Not on file   Occupational History    Not on file   Tobacco Use    Smoking status: Never Smoker    Smokeless tobacco: Never Used   Vaping Use    Vaping Use: Never used   Substance and Sexual Activity    Alcohol use: Not Currently     Comment: No caffeine    Drug use: No    Sexual activity: Not on file   Other Topics Concern    Not on file   Social History Narrative    Not on file     Social Determinants of Health     Financial Resource Strain: Low Risk     Difficulty of Paying Living Expenses: Not hard at all   Food Insecurity: No Food Insecurity    Worried About Running Out of Food in the Last Year: Never true    Wojciech of Food in the Last Year: Never true   Transportation Needs:     Lack of Transportation (Medical): Not on file    Lack of Transportation (Non-Medical):  Not on file   Physical Activity:     Days of Exercise per Week: Not on file    Minutes of Exercise per Session: Not on file   Stress:     Feeling of Stress : Not on file   Social Connections:     Frequency of Communication with Friends and Family: Not on file    Frequency of Social Gatherings with Friends and Family: Not on file    Attends Religion Services: Not on file    Active Member of Clubs or Organizations: Not on file    Attends Club or Organization Meetings: Not on file    Marital Status: Not on file   Intimate Partner Violence:     Fear of Current or Ex-Partner: Not on file    Emotionally Abused: Not on file    Physically Abused: Not on file    Sexually Abused: Not on file   Housing Stability:     Unable to Pay for Housing in the Last Year: Not on file    Number of [place] in the Last Year: Not on file    Unstable Housing in the Last Year: Not on file       Family History   Problem Relation Age of Onset    Lung Cancer Mother         lung    COPD Father        REVIEW OF SYSTEMS:     Sharla denies fever/chills, chest pain, shortness of breath, new bowel or bladder complaints. All other review of systems was negative. PHYSICAL EXAMINATION:      /78   Pulse 72   Temp 97.7 °F (36.5 °C) (Infrared)   Ht 5' 3\" (1.6 m)   Wt 283 lb (128.4 kg)   LMP  (LMP Unknown)   SpO2 91%   BMI 50.13 kg/m²      General:       General appearance:  Pleasant and well-hydrated, in no distress and A & O x 3  Build: Morbidly obese  Function: Rises from seated position easily     HEENT:     Head:normocephalic, atraumatic     Lungs:     Breathing:normal breathing pattern      CVS:     RRR     Abdomen:     Shape:obese, non-distended and normal     Cervical spine:     Inspection:normal     Thoracic spine:                Spine inspection:normal      Lumbar spine:     Spine inspection: Normal   Palpation: Tenderness paravertebral muscles Yes bilaterally  Range of motion: Decreased, flexion Decreased, Lateral bending, extension and rotation bilaterally reduced is somewhat painful.   Lumbar facte loading +  Sacroiliac joint tenderness No bilaterally  KWABENA test: negative bilaterally  Gaenslen's test:negative bilaterally   Piriformis tenderness: negative bilaterally  SLR : negative bilaterally     Musculoskeletal:     Trigger points No      Extremities:     Tremors:None bilaterally upper and lower  Edema:no     Neurological:     Sensory: Normal to light touch      Motor:   Right hip flexors 5/5  Left hip flexors 5/5                 Right Quadriceps 5/5          Left Quadriceps 5/5           Right Gastrocnemius 5/5    Left Gastrocnemius 5/5  Right Ant Tibialis 5/5  Left Ant Tibialis 5/5     Reflexes:    B/l le equal     Dermatology:     Skin:no rashes or lesions noted    Assessment/Plan:       Diagnosis Orders   1. DDD (degenerative disc disease), lumbar      2. Lumbosacral spondylosis without myelopathy      3. Spinal stenosis of lumbar region, unspecified whether neurogenic claudication present      4. Obesity, unspecified classification, unspecified obesity type, unspecified whether serious comorbidity present            59 y.o.  female with h/o chronic low back pain in the lumbar area. Pain aggravated on ambulation and relieved by rest.  Tried conservative treatment. H/o obesity-is supposed to follow-up with bariatric medicine  H/o DM     Has undergone X ray / MRI of LS spine and is here to review the results and treatment plan. Xray LS spine- reviewed     MRI LS spine - reviewed personally and discussed the findings.     PLAN:     Right L3 and left L4 TFESI under fluoroscopy. RBA discussed. Also has facet pain. If facet pain persists, after CLYDE, consider facet interventions.     Relafen for as needed use. Use instructions reviewed. No OTC NSAID's while on Relafen.      Continue follow-up with the bariatric medicine regarding weight loss recommendations.   Is very interested in losing weight and is motivated.     Counseling :     Patient encouraged to stay active and to watch/lose weight     Encouraged to continue Regular home exercise program as tolerated - stretching / strengthening.     Treatment plan discussed with the patient including medication and procedure side effects.     Controlled Substances Monitoring: OARRS reviewed- not on chronic opioids.     Tutu Thao MD

## 2022-07-06 ENCOUNTER — TELEPHONE (OUTPATIENT)
Dept: PAIN MANAGEMENT | Age: 62
End: 2022-07-06

## 2022-07-06 NOTE — TELEPHONE ENCOUNTER
Call to Zaheer Lugo that procedure was approved for 7/19/2022 and that the surgery center should call her a few days before for the pre op call and after 3:00 PM the business day before with the arrival time. Instructed Sharla to hold ibuprofen for 24 hours, naprosyn for 4 days and any aspirin containing products or fish oil for 7 days. Instructed to call office back if any questions. Sharla verbalized understanding.     Rosa Mejia RN  Pain Management

## 2022-07-07 ENCOUNTER — PATIENT MESSAGE (OUTPATIENT)
Dept: FAMILY MEDICINE CLINIC | Age: 62
End: 2022-07-07

## 2022-07-07 NOTE — TELEPHONE ENCOUNTER
From: Enedelia Mir  To: Dr. Belkis Hubbard  Sent: 7/7/2022 1:48 PM EDT  Subject: Urinary tract infection    Can Dr Tommie Wooten send a prescription for a urinary tract infection, im having throbbing pressure and going frequently.  I've had this before so im familiar with the symptoms

## 2022-07-08 RX ORDER — NITROFURANTOIN 25; 75 MG/1; MG/1
100 CAPSULE ORAL 2 TIMES DAILY
Qty: 14 CAPSULE | Refills: 0 | Status: SHIPPED | OUTPATIENT
Start: 2022-07-08 | End: 2022-07-15

## 2022-07-19 ENCOUNTER — HOSPITAL ENCOUNTER (OUTPATIENT)
Age: 62
Setting detail: OUTPATIENT SURGERY
Discharge: HOME | End: 2022-07-19
Attending: ANESTHESIOLOGY | Admitting: ANESTHESIOLOGY
Payer: MEDICAID

## 2022-07-19 ENCOUNTER — HOSPITAL ENCOUNTER (OUTPATIENT)
Dept: OPERATING ROOM | Age: 62
Setting detail: OUTPATIENT SURGERY
Discharge: HOME | End: 2022-07-19
Attending: ANESTHESIOLOGY
Payer: MEDICAID

## 2022-07-19 VITALS
RESPIRATION RATE: 15 BRPM | HEIGHT: 63 IN | SYSTOLIC BLOOD PRESSURE: 128 MMHG | WEIGHT: 279 LBS | BODY MASS INDEX: 49.43 KG/M2 | OXYGEN SATURATION: 92 % | TEMPERATURE: 98 F | HEART RATE: 65 BPM | DIASTOLIC BLOOD PRESSURE: 59 MMHG

## 2022-07-19 DIAGNOSIS — M51.9 LUMBAR DISC DISEASE: ICD-10-CM

## 2022-07-19 PROCEDURE — 6360000004 HC RX CONTRAST MEDICATION: Performed by: ANESTHESIOLOGY

## 2022-07-19 PROCEDURE — 64484 NJX AA&/STRD TFRM EPI L/S EA: CPT | Performed by: ANESTHESIOLOGY

## 2022-07-19 PROCEDURE — 6360000002 HC RX W HCPCS: Performed by: ANESTHESIOLOGY

## 2022-07-19 PROCEDURE — 2709999900 HC NON-CHARGEABLE SUPPLY: Performed by: ANESTHESIOLOGY

## 2022-07-19 PROCEDURE — 3600000005 HC SURGERY LEVEL 5 BASE: Performed by: ANESTHESIOLOGY

## 2022-07-19 PROCEDURE — 3209999900 FLUORO FOR SURGICAL PROCEDURES

## 2022-07-19 PROCEDURE — 2500000003 HC RX 250 WO HCPCS: Performed by: ANESTHESIOLOGY

## 2022-07-19 PROCEDURE — 7100000010 HC PHASE II RECOVERY - FIRST 15 MIN: Performed by: ANESTHESIOLOGY

## 2022-07-19 PROCEDURE — 64483 NJX AA&/STRD TFRM EPI L/S 1: CPT | Performed by: ANESTHESIOLOGY

## 2022-07-19 PROCEDURE — 7100000011 HC PHASE II RECOVERY - ADDTL 15 MIN: Performed by: ANESTHESIOLOGY

## 2022-07-19 RX ORDER — LIDOCAINE HYDROCHLORIDE 5 MG/ML
INJECTION, SOLUTION INFILTRATION; INTRAVENOUS PRN
Status: DISCONTINUED | OUTPATIENT
Start: 2022-07-19 | End: 2022-07-19 | Stop reason: ALTCHOICE

## 2022-07-19 RX ORDER — DEXAMETHASONE SODIUM PHOSPHATE 10 MG/ML
INJECTION, SOLUTION INTRAMUSCULAR; INTRAVENOUS PRN
Status: DISCONTINUED | OUTPATIENT
Start: 2022-07-19 | End: 2022-07-19 | Stop reason: ALTCHOICE

## 2022-07-19 RX ORDER — SODIUM CHLORIDE 0.9 % (FLUSH) 0.9 %
5-40 SYRINGE (ML) INJECTION EVERY 12 HOURS SCHEDULED
Status: DISCONTINUED | OUTPATIENT
Start: 2022-07-19 | End: 2022-07-19 | Stop reason: HOSPADM

## 2022-07-19 RX ORDER — SODIUM CHLORIDE 9 MG/ML
INJECTION, SOLUTION INTRAVENOUS PRN
Status: DISCONTINUED | OUTPATIENT
Start: 2022-07-19 | End: 2022-07-19 | Stop reason: HOSPADM

## 2022-07-19 RX ORDER — SODIUM CHLORIDE 0.9 % (FLUSH) 0.9 %
5-40 SYRINGE (ML) INJECTION PRN
Status: DISCONTINUED | OUTPATIENT
Start: 2022-07-19 | End: 2022-07-19 | Stop reason: HOSPADM

## 2022-07-19 ASSESSMENT — PAIN DESCRIPTION - DESCRIPTORS: DESCRIPTORS: ACHING

## 2022-07-19 ASSESSMENT — PAIN - FUNCTIONAL ASSESSMENT: PAIN_FUNCTIONAL_ASSESSMENT: 0-10

## 2022-07-19 NOTE — INTERVAL H&P NOTE
Update History & Physical    The patient's History and Physical of July 1, 2022 was reviewed with the patient and I examined the patient. There was no change. The surgical site was confirmed by the patient and me. Plan:   Lumbar TFESI. The risks, benefits, expected outcome, and alternative to the recommended procedure have been discussed with the patient. Patient understands and wants to proceed with the procedure.      Electronically signed by Maryellen Woo MD on 7/19/2022

## 2022-07-19 NOTE — DISCHARGE INSTRUCTIONS
[facility]) Pain Management Department  [phone]   Post-Pain Block/ Radiofrequency Home Going Instructions    1-Go home, rest for the remainder of the day  2-Please do not lift over 20 pounds the day of the injection  3-If you received sedation No: alcohol, driving, operating lawn mowers, plows, tractors or other dangerous equipment until next morning. Do not make important decisions or sign legal documents for 24 hours. You may experience light headedness, dizziness, nausea or sleepiness after sedation. Do not stay alone. A responsible adult must be with you for 24 hours. You could be nauseated from the medications you have received. Your IV site may be sore and bruised. 4-No dietary restrictions     5-Resume all medications the same day, blood thinners to be resumed 24 hours after injection    6-Keep the surgical site clean and dry, you may shower the next morning and remove the      dressing. 7- No sitz baths, tub baths or hot tubs/swimming for 24 hours. 8- If you have any pain at the injection site(s), application of an ice pack to the area should be       helpful, 20 minutes on/20 minutes off for next 48 hours. 9- Call [facility] pain management immediately at if you develop. Fever greater than 100.4 F  Have bleeding or drainage from the puncture site  Have progressive Leg/arm numbness and or weakness  Loss of control of bowel and or bladder (wet/soil yourself)  Severe headache with inability to lift head  10-You may return to work the next day          Infection After Surgery: Care Instructions  Overview  After surgery, an infection is always possible. It doesn't mean that thesurgery didn't go well. Because an infection can be serious, your doctor has taken steps to manage it. Your doctor checked the infection and cleaned it if necessary. Your doctor may have made an opening in the area so that the pus can drain out.  You may have gauze in the cut so that the area will stay open and keep draining. You mayneed antibiotics. You will need to follow up with your doctor to make sure the infection has goneaway. Follow-up care is a key part of your treatment and safety. Be sure to make and go to all appointments, and call your doctor if you are having problems. It's also a good idea to know your test results and keep alist of the medicines you take. How can you care for yourself at home? Make sure your surgeon knows about the infection, especially if you saw another doctor about your symptoms. If your doctor prescribed antibiotics, take them as directed. Do not stop taking them just because you feel better. You need to take the full course of antibiotics. Ask your doctor if you can take an over-the-counter pain medicine, such as acetaminophen (Tylenol), ibuprofen (Advil, Motrin), or naproxen (Aleve). Be safe with medicines. Read and follow all instructions on the label. Do not take two or more pain medicines at the same time unless the doctor told you to. Many pain medicines have acetaminophen, which is Tylenol. Too much acetaminophen (Tylenol) can be harmful. Prop up the area on a pillow anytime you sit or lie down during the next 3 days. Try to keep it above the level of your heart. This will help reduce swelling. Keep the skin clean and dry. You may have a bandage over the cut (incision). A bandage helps the incision heal and protects it. Your doctor will tell you how to take care of this. Keep it clean and dry. You may have drainage from the wound. If your doctor told you how to care for your incision, follow your doctor's instructions. If you did not get instructions, follow this general advice:  Wash around the incision with clean water 2 times a day. Don't use hydrogen peroxide or alcohol, which can slow healing. When should you call for help? Call your doctor now or seek immediate medical care if:    You have signs that your infection is getting worse, such as:   Increased pain, swelling, warmth, or redness in the area. Red streaks leading from the area. Pus draining from the wound. A new or higher fever. Watch closely for changes in your health, and be sure to contact your doctor ifyou have any problems. Where can you learn more? Go to https://[email or web address].[email or web address]. org and sign in to your [employer] account. Enter C340 in the Q-go box to learn more about \"Infection After Surgery: Care Instructions. \"     If you do not have an account, please click on the \"Sign Up Now\" link. Current as of: January 20, 2022               Content Version: 13.3  © 2006-2022 Healthwise, [facility]. Care instructions adapted under license by [facility] [facility] If you have questions about a medical condition or this instruction, always ask your healthcare professional. [address]  any warranty or liability for your use of this information.

## 2022-07-19 NOTE — OP NOTE
Operative Note      Patient: Dillon Urena  YOB: 1960  MRN: [id number]    Date of Procedure: 2022    Pre-Op Diagnosis: Lumbar radiculopathy [M54.16], lumbar DDD    Post-Op Diagnosis: Same       Procedure(s):  LUMBAR TRANSFORAMINAL EPIDURAL STEROID INJECTION RIGHT L3 AND LEFT L4 UNDER FLUOROSCOPIC GUIDANCE     Surgeon(s):  Kaleigh Perez MD    Assistant:   * No surgical staff found *    Anesthesia: Local    Estimated Blood Loss (mL): Minimal    Complications: None    Specimens:   * No specimens in log *    Implants:  * No implants in log *      Drains: * No LDAs found *    Findings: good needle placement    Detailed Description of Procedure:   2022    Patient: Dillon Urena  :  1960  Age:  59 y.o. Sex:  female     PRE-OPERATIVE DIAGNOSIS: Lumbar disc displacement, lumbar neural foraminal stenosis, lumbar radiculopathy. POST-OPERATIVE DIAGNOSIS: Same. PROCEDURE: Right L3 and left L4 Transforaminal epidural steroid injection under fluoroscopic guidance. SURGEON: Kaleigh Perez MD    ANESTHESIA: Local    ESTIMATED BLOOD LOSS: None.  ______________________________________________________________________  BRIEF HISTORY: Sharla Mir comes in today for the  Lumbar transforaminal epidural steroid injection under fluoroscopic guidance. The potential complications of this procedure were discussed with her again today. She has elected to undergo the aforementioned procedure. Sky complete History & Physical examination were reviewed in depth, a copy of which is in the chart. DESCRIPTION OF PROCEDURE:    After confirming written and informed consent, a time-out was performed and Sky name and date of birth, the procedure to be performed as well as the plan for the location of the needle insertion were confirmed. The patient was brought into the procedure room and placed in the prone position on the fluoroscopy table.  Standard monitors were placed and vital signs were observed throughout the procedure. The area of the lumbar spine was prepped with chloraprep and draped in a sterile manner. The vertebral body was identified with AP fluoroscopy. An oblique view was obtained to better visualize the inferior junction of the pedicle and transverse process . The 6 o'clock position of the pedicle was marked and identified. The skin and subcutaneous tissue were anesthetized with 0.5% lidocaine. Two  # 22 gauge 5 inch pencil point needle was directed toward the targeted point under fluoroscopy until bone was contacted. The needle was then walked inferiorly until the neural foramen was entered . A lateral fluoroscopic view was then used to place the needle tip in the middle of the foramen. Negative aspiration was confirmed for blood and CSF and 0.5-1 cc of Omnipaque 240 contrast was injected at each level under live fluoroscopy. Appropriate neurograms were observed under AP fluoroscopy. Then after negative aspiration, a solution of the 2 cc of 0.5% lidocaine and 7 mg Dexamethasone was easily injected at each level. The needles were removed with constant aspiration technique. The patient back was cleaned and a bandage was placed over the needle insertion points    Disposition the patient tolerated the procedure well and there were no complications . Vital signs remained stable throughout the procedure. The patient was escorted to the recovery area where they remained until discharge and written discharge instructions for the procedure were given. Plan: Sharla will return to our pain management center as scheduled.      Tutu Thao MD

## 2022-07-29 RX ORDER — CLOTRIMAZOLE AND BETAMETHASONE DIPROPIONATE 10; .64 MG/G; MG/G
CREAM TOPICAL
Qty: 45 G | Refills: 1 | Status: SHIPPED | OUTPATIENT
Start: 2022-07-29

## 2022-08-03 ENCOUNTER — PATIENT MESSAGE (OUTPATIENT)
Dept: FAMILY MEDICINE CLINIC | Age: 62
End: 2022-08-03

## 2022-08-03 DIAGNOSIS — U07.1 COVID: Primary | ICD-10-CM

## 2022-08-03 RX ORDER — AZITHROMYCIN 250 MG/1
250 TABLET, FILM COATED ORAL SEE ADMIN INSTRUCTIONS
Qty: 6 TABLET | Refills: 0 | Status: SHIPPED | OUTPATIENT
Start: 2022-08-03 | End: 2022-08-08

## 2022-08-03 NOTE — TELEPHONE ENCOUNTER
Attempted to contact pt, no answer, left detailed vm.      Electronically signed by Td Crook on 8/3/22 at 3:29 PM EDT

## 2022-08-03 NOTE — TELEPHONE ENCOUNTER
Call pt and ask her what her symptoms are and let me know    tell pt to take vit.  C, vit. D, zinc, aspirin 325    I will call in antibiotic for pt    if any worse go ED ASAP

## 2022-08-05 NOTE — TELEPHONE ENCOUNTER
This MA spoke with pt via telephone. Pt advised to NOT use hydrogen peroxide in nebulizer. Pt verbalized she understood. Pt c/o wheezing, fatigue, weakness, and diarrhea. Pharmacy confirmed as [facility].      Electronically signed by Patric Purdy MA on 8/5/22 at 10:19 AM EDT

## 2022-08-05 NOTE — TELEPHONE ENCOUNTER
Call pt and tell her to take vit.  C, vit. D, and zinc   if pt is not taking aspirin tell her to take 325 mg aspirin daily    I sent in zpack    if pt is worse go ED ASAP

## 2022-08-08 ENCOUNTER — APPOINTMENT (OUTPATIENT)
Dept: GENERAL RADIOLOGY | Age: 62
End: 2022-08-08
Payer: MEDICAID

## 2022-08-08 ENCOUNTER — HOSPITAL ENCOUNTER (EMERGENCY)
Age: 62
Discharge: HOME | End: 2022-08-09
Attending: EMERGENCY MEDICINE
Payer: MEDICAID

## 2022-08-08 DIAGNOSIS — U07.1 COVID: Primary | ICD-10-CM

## 2022-08-08 LAB
ALBUMIN SERPL-MCNC: 4.3 G/DL (ref 3.5–5.2)
ALP BLD-CCNC: 98 U/L (ref 35–104)
ALT SERPL-CCNC: 26 U/L (ref 0–32)
ANION GAP SERPL CALCULATED.3IONS-SCNC: 9 MMOL/L (ref 7–16)
AST SERPL-CCNC: 30 U/L (ref 0–31)
BASOPHILS ABSOLUTE: 0.02 E9/L (ref 0–0.2)
BASOPHILS RELATIVE PERCENT: 0.3 % (ref 0–2)
BILIRUB SERPL-MCNC: 0.4 MG/DL (ref 0–1.2)
BUN BLDV-MCNC: 11 MG/DL (ref 6–23)
CALCIUM SERPL-MCNC: 9.3 MG/DL (ref 8.6–10.2)
CHLORIDE BLD-SCNC: 104 MMOL/L (ref 98–107)
CO2: 27 MMOL/L (ref 22–29)
CREAT SERPL-MCNC: 0.8 MG/DL (ref 0.5–1)
EOSINOPHILS ABSOLUTE: 0.19 E9/L (ref 0.05–0.5)
EOSINOPHILS RELATIVE PERCENT: 2.6 % (ref 0–6)
GFR AFRICAN AMERICAN: >60
GFR NON-AFRICAN AMERICAN: >60 ML/MIN/1.73
GLUCOSE BLD-MCNC: 118 MG/DL (ref 74–99)
HCT VFR BLD CALC: 40.3 % (ref 34–48)
HEMOGLOBIN: 12.7 G/DL (ref 11.5–15.5)
IMMATURE GRANULOCYTES #: 0.05 E9/L
IMMATURE GRANULOCYTES %: 0.7 % (ref 0–5)
LYMPHOCYTES ABSOLUTE: 2.11 E9/L (ref 1.5–4)
LYMPHOCYTES RELATIVE PERCENT: 29.4 % (ref 20–42)
MCH RBC QN AUTO: 27.8 PG (ref 26–35)
MCHC RBC AUTO-ENTMCNC: 31.5 % (ref 32–34.5)
MCV RBC AUTO: 88.2 FL (ref 80–99.9)
MONOCYTES ABSOLUTE: 0.54 E9/L (ref 0.1–0.95)
MONOCYTES RELATIVE PERCENT: 7.5 % (ref 2–12)
NEUTROPHILS ABSOLUTE: 4.26 E9/L (ref 1.8–7.3)
NEUTROPHILS RELATIVE PERCENT: 59.5 % (ref 43–80)
PDW BLD-RTO: 15.2 FL (ref 11.5–15)
PLATELET # BLD: 147 E9/L (ref 130–450)
PMV BLD AUTO: 11.4 FL (ref 7–12)
POTASSIUM SERPL-SCNC: 4.2 MMOL/L (ref 3.5–5)
PRO-BNP: 178 PG/ML (ref 0–125)
RBC # BLD: 4.57 E12/L (ref 3.5–5.5)
SODIUM BLD-SCNC: 140 MMOL/L (ref 132–146)
TOTAL PROTEIN: 7.7 G/DL (ref 6.4–8.3)
TROPONIN, HIGH SENSITIVITY: 10 NG/L (ref 0–9)
TROPONIN, HIGH SENSITIVITY: 9 NG/L (ref 0–9)
WBC # BLD: 7.2 E9/L (ref 4.5–11.5)

## 2022-08-08 PROCEDURE — 80053 COMPREHEN METABOLIC PANEL: CPT

## 2022-08-08 PROCEDURE — 85025 COMPLETE CBC W/AUTO DIFF WBC: CPT

## 2022-08-08 PROCEDURE — 71045 X-RAY EXAM CHEST 1 VIEW: CPT

## 2022-08-08 PROCEDURE — 99285 EMERGENCY DEPT VISIT HI MDM: CPT

## 2022-08-08 PROCEDURE — 83880 ASSAY OF NATRIURETIC PEPTIDE: CPT

## 2022-08-08 PROCEDURE — 36415 COLL VENOUS BLD VENIPUNCTURE: CPT

## 2022-08-08 PROCEDURE — 84484 ASSAY OF TROPONIN QUANT: CPT

## 2022-08-08 ASSESSMENT — ENCOUNTER SYMPTOMS
WHEEZING: 1
CONSTIPATION: 0
BACK PAIN: 0
SINUS PAIN: 0
VOMITING: 0
NAUSEA: 0
COUGH: 1
EYE PAIN: 0
SHORTNESS OF BREATH: 1
DIARRHEA: 0
ABDOMINAL PAIN: 0

## 2022-08-08 ASSESSMENT — PAIN - FUNCTIONAL ASSESSMENT: PAIN_FUNCTIONAL_ASSESSMENT: NONE - DENIES PAIN

## 2022-08-09 VITALS
HEIGHT: 63 IN | HEART RATE: 84 BPM | RESPIRATION RATE: 20 BRPM | OXYGEN SATURATION: 95 % | WEIGHT: 273 LBS | SYSTOLIC BLOOD PRESSURE: 182 MMHG | DIASTOLIC BLOOD PRESSURE: 88 MMHG | BODY MASS INDEX: 48.37 KG/M2 | TEMPERATURE: 98.3 F

## 2022-08-09 NOTE — ED PROVIDER NOTES
[facility]  Department of Emergency Medicine     Written by: Isaac Woody DO  Patient Name: Rosy Schulte  Attending Provider: Ramy Godfrey DO  Admit Date: 2022  8:12 PM  MRN: [id number]                   : 1960        Chief Complaint   Patient presents with    Positive For Covid-19     Tested positive last Monday    Cough    Shortness of Breath    - Chief complaint    Pt is a 58 y/o obese female here with the CC of cough. Pt tested positive for COVID 19 on Monday and also has JOSE DANIEL, and type 2 diabetes. Pt says that since her diagnosis her cough has gotten progressively worse especially at night when she tries to lie down to the point that she is losing sleep. On Monday her primary care specialist viridiana HUNT prescribed her ivermectin and a Z pack. Pt has completed the Z pack. Associated symptoms include SOB. Patient also denies any unilateral leg pain or swelling or a history of blood clots. Review of Systems   Constitutional:  Negative for chills and fever. HENT:  Negative for ear pain and sinus pain. Eyes:  Negative for pain. Respiratory:  Positive for cough, shortness of breath and wheezing. Cardiovascular:  Negative for chest pain. Gastrointestinal:  Negative for abdominal pain, constipation, diarrhea, nausea and vomiting. Genitourinary:  Negative for difficulty urinating, dysuria and flank pain. Musculoskeletal:  Negative for back pain. Skin:  Negative for rash. Neurological:  Negative for dizziness, weakness, light-headedness and headaches. Psychiatric/Behavioral:  Negative for confusion. Physical Exam  Constitutional:       General: She is not in acute distress. Appearance: Normal appearance. She is not ill-appearing. HENT:      Head: Normocephalic. Right Ear: External ear normal.      Left Ear: External ear normal.      Nose: Nose normal. No congestion or rhinorrhea.       Mouth/Throat:      Mouth: Mucous membranes are moist. Range    WBC 7.2 4.5 - 11.5 E9/L    RBC 4.57 3.50 - 5.50 E12/L    Hemoglobin 12.7 11.5 - 15.5 g/dL    Hematocrit 40.3 34.0 - 48.0 %    MCV 88.2 80.0 - 99.9 fL    MCH 27.8 26.0 - 35.0 pg    MCHC 31.5 (L) 32.0 - 34.5 %    RDW 15.2 (H) 11.5 - 15.0 fL    Platelets 546 766 - 677 E9/L    MPV 11.4 7.0 - 12.0 fL    Neutrophils % 59.5 43.0 - 80.0 %    Immature Granulocytes % 0.7 0.0 - 5.0 %    Lymphocytes % 29.4 20.0 - 42.0 %    Monocytes % 7.5 2.0 - 12.0 %    Eosinophils % 2.6 0.0 - 6.0 %    Basophils % 0.3 0.0 - 2.0 %    Neutrophils Absolute 4.26 1.80 - 7.30 E9/L    Immature Granulocytes # 0.05 E9/L    Lymphocytes Absolute 2.11 1.50 - 4.00 E9/L    Monocytes Absolute 0.54 0.10 - 0.95 E9/L    Eosinophils Absolute 0.19 0.05 - 0.50 E9/L    Basophils Absolute 0.02 0.00 - 0.20 E9/L   Brain Natriuretic Peptide   Result Value Ref Range    Pro- (H) 0 - 125 pg/mL   Troponin   Result Value Ref Range    Troponin, High Sensitivity 9 0 - 9 ng/L       Radiology:  XR CHEST 1 VIEW   Final Result   Addendum (preliminary) 1 of 1   ADDENDUM:   Indication: COVID      Comparison: None      The lungs are without acute focal process. There is no effusion or   pneumothorax. The cardiomediastinal silhouette is without acute process. The   osseous structures are without acute process. TECHNIQUE:   Two-view chest         Final   No acute process. ------------------------- NURSING NOTES AND VITALS REVIEWED ---------------------------  Date / Time Roomed:  8/8/2022  8:12 PM  ED Bed Assignment:  26/26    The nursing notes within the ED encounter and vital signs as below have been reviewed.    BP (!) 182/88   Pulse 84   Temp 98.3 °F (36.8 °C) (Oral)   Resp 20   Ht 5' 3\" (1.6 m)   Wt 273 lb (123.8 kg)   LMP  (LMP Unknown)   SpO2 95%   BMI 48.36 kg/m²   Oxygen Saturation Interpretation: Normal      ------------------------------------------ PROGRESS NOTES ------------------------------------------  12:38 AM EDT  I have spoken with the patient and discussed todays results, in addition to providing specific details for the plan of care and counseling regarding the diagnosis and prognosis. Their questions are answered at this time and they are agreeable with the plan. I discussed at length with them reasons for immediate return here for re evaluation. They will followup with their primary care physician by calling their office tomorrow. --------------------------------- ADDITIONAL PROVIDER NOTES ---------------------------------  At this time the patient is without objective evidence of an acute process requiring hospitalization or inpatient management. They have remained hemodynamically stable throughout their entire ED visit and are stable for discharge with outpatient follow-up. The plan has been discussed in detail and they are aware of the specific conditions for emergent return, as well as the importance of follow-up. Discharge Medication List as of 8/9/2022 12:20 AM          Diagnosis:  1. COVID        Disposition:  Patient's disposition: Discharge to home  Patient's condition is stable. Patient was seen and evaluated by myself and my attending Terrie Freeman DO. Assessment and Plan discussed with attending provider, please see attestation for final plan of care. DO Altagracia Garcia DO  Resident  08/09/22 [id number]    ATTENDING PROVIDER ATTESTATION:     I have personally performed and/or participated in the history, exam, medical decision making, and procedures and agree with all pertinent clinical information. I have also reviewed and agree with the past medical, family and social history unless otherwise noted. I have discussed this patient in detail with the resident, and provided the instruction and education regarding cough, shortness of breath and COVID-19. My findings/Plan: Heart RRR. Lungs CTA bilaterally. Abdomen soft, nontender. Bowel sounds normal. Supportive care. Discharge for outpatient follow up.          [address] [facility],   09/14/22 0599

## 2022-08-10 RX ORDER — ALBUTEROL SULFATE 2.5 MG/3ML
2.5 SOLUTION RESPIRATORY (INHALATION) EVERY 6 HOURS PRN
Qty: 120 EACH | Refills: 3 | Status: SHIPPED | OUTPATIENT
Start: 2022-08-10

## 2022-08-24 ENCOUNTER — OFFICE VISIT (OUTPATIENT)
Dept: BARIATRICS/WEIGHT MGMT | Age: 62
End: 2022-08-24
Payer: MEDICAID

## 2022-08-24 VITALS
HEART RATE: 56 BPM | BODY MASS INDEX: 52.89 KG/M2 | HEIGHT: 62 IN | WEIGHT: 287.4 LBS | TEMPERATURE: 98.4 F | DIASTOLIC BLOOD PRESSURE: 87 MMHG | SYSTOLIC BLOOD PRESSURE: 163 MMHG

## 2022-08-24 DIAGNOSIS — E66.01 CLASS 3 SEVERE OBESITY DUE TO EXCESS CALORIES WITH SERIOUS COMORBIDITY AND BODY MASS INDEX (BMI) OF 50.0 TO 59.9 IN ADULT (HCC): ICD-10-CM

## 2022-08-24 DIAGNOSIS — G47.33 OSA (OBSTRUCTIVE SLEEP APNEA): Primary | ICD-10-CM

## 2022-08-24 PROCEDURE — 99202 OFFICE O/P NEW SF 15 MIN: CPT

## 2022-08-24 PROCEDURE — 99205 OFFICE O/P NEW HI 60 MIN: CPT | Performed by: INTERNAL MEDICINE

## 2022-08-24 NOTE — PATIENT INSTRUCTIONS
one-eighth to one-fourth of the target amount and then adding another one-eighth to one-fourth every one or two weeks until reaching the target. Take one multivitamin every day    Targets:  Limit calorie intake to 1200 calories/day  Walk 30 minutes daily or equivalent (210 min/week)  Avoid eating 2 hours within bedtime. Tips:  Do not eat outside of the dining room or the kitchen  Do not eat while watching TV, videos, working on the computer or using a smart phone  Do not eat food out of a multi-serving bag or container    Follow up in 4 weeks.

## 2022-08-24 NOTE — PROGRESS NOTES
CC -   JOSE DANIEL, weight gain    Would like to be at about 160-170 lbs    BACKGROUND -     First visit: 8/24/22    Obesity (all weight in lbs)  Began after having her son in 1995  Initial BMI 53.42, Wt 287.4, Ht 61.5\"  HS Grad wt 120   Lowest   wt 120   Highest  wt 299 (March 2022)  Pattern of wt gain: gradual  Wt change past yr: up and down, has lost about 12 lbs since March 2022  Most wt lost: 48 (in her 45s)  Other diets attempted: Saud's diet, knows about other diet but has had adhering to    Desire to lose weight: 10/10 (not thinking about surgery)    Initial Diet:    Number of meals per day - 1 (dinner)    Number of snacks per day - 1    Meal volume - 12\" plate,  never seconds    Fast food/convenience store - 0-1x/week    Restaurants (not fast food) - 0x/week   Sweets - 7d/week   Chips - 0d/week   Crackers/pretzels - 0d/week   Nuts - 0d/week   Peanut Butter - 7d/week (1 tsp)   Popcorn - 0d/week   Dried fruit - 0d/week   Whole fruit - 7d/week (eg small peach)   Breakfast cereal - 0d/week   Granola/Protein/Energy bar - 7d/week (kellogs 12 g protein 190 Silver, kind bar 170 Silver)   Sugar sweetened beverages - none. Sugar free root beer, adelfo dry cranberry nikole etc. No coffee/tea, no energy drink. Protein - Protein powder Ka'yony (25 g 240 Silver)   Fiber - No supplements    Wt effect of HR foods = 2100 Silver/wk = 300 Silver/d= 17% DEN = 31 lb/year.      Initial Exercise:    Gym membership - no    Walking - no    Running - no    Resistance - no    Aerobic class - no    ______________________    [facility] [place] [facility] -  Past Medical History:   Diagnosis Date    History of blood transfusion 8187    at [facility] complication after kidney stone surgery    History of kidney stones     Hx of cardiovascular stress test 12/17/2019    Lexiscan stress test    Hypertension     Hypothyroidism     Kidney disease     Obesity     Sleep apnea     Thyroid disease     Type 2 diabetes mellitus without complication ([address]    CPAP+  Past Surgical History:   Procedure Laterality Date    BREAST SURGERY      CHOLECYSTECTOMY, LAPAROSCOPIC N/A 5/18/2020    LAPAROSCOPIC CHOLECYSTECTOMY WITH INTRAOPERATIVE CHOLANGIOGRAM performed by Mejia Polo MD at [address] (CERVIX STATUS UNKNOWN)      HYSTERECTOMY, VAGINAL      JOINT REPLACEMENT Right 2019    knee  [facility]    KIDNEY STONE SURGERY      incisional removal    PAIN MANAGEMENT PROCEDURE Bilateral 7/19/2022    LUMBAR TRANSFORAMINAL EPIDURAL STEROID INJECTION RIGHT L3 AND LEFT L4 UNDER FLUOROSCOPIC GUIDANCE (CPT 01857) performed by Miriam Fenton MD at [address] [place] [address]     Prior to Admission medications    Medication Sig Start Date End Date Taking? Authorizing Provider   albuterol (PROVENTIL) (2.5 MG/3ML) 0.083% nebulizer solution Take 3 mLs by nebulization every 6 hours as needed for Wheezing or Shortness of Breath 8/10/22   Esperanza P Catterlin, DO   clotrimazole-betamethasone (LOTRISONE) 1-0.05 % cream Apply topically 3 times daily. 7/29/22   Esperanza P Catterlin, DO   blood glucose monitor strips Test 3 times a day & as needed for symptoms of irregular blood glucose. Dispense sufficient amount for indicated testing frequency plus additional to accommodate PRN testing needs. ONE TOUCH ULTRA. 6/22/22   Esperanza P Catterlin, DO   hydrocortisone 2.5 % cream Apply topically 2 times daily.  6/10/22   Esperanza P Catterlin, DO   albuterol sulfate HFA (PROVENTIL;VENTOLIN;PROAIR) 108 (90 Base) MCG/ACT inhaler Inhale 2 puffs into the lungs every 6 hours as needed for Wheezing or Shortness of Breath 6/10/22   Tommie [place] SHANT Catluislin, DO   nabumetone (RELAFEN) 750 MG tablet Take 1 tablet by mouth 2 times daily as needed for Pain 5/31/22   Miriam Fenton MD   vitamin D (ERGOCALCIFEROL) 1.25 MG (86153 UT) CAPS capsule Take 1 capsule by mouth once a week 5/13/22   Tyra Joel, DO   vitamin D (ERGOCALCIFEROL) 1.25 MG (07789 UT) CAPS capsule Take 1 capsule by mouth once a week 5/11/22   Belkis Hubbard DO   pravastatin (PRAVACHOL) 40 MG tablet Take 1 tablet by mouth daily 4/5/22 10/2/22  Maryuri Kurt P Catterlin, DO   insulin glargine (LANTUS SOLOSTAR) 100 UNIT/ML injection pen Inject 20 Units into the skin nightly 3/11/22 9/7/22  Esperanza P Catterlin, DO   Insulin Pen Needle 31G X 5 MM MISC 1 each by Does not apply route daily 3/11/22   Brayden Martel Catterlin, DO   levothyroxine (SYNTHROID) 75 MCG tablet take 1 tablet by mouth once daily 3/11/22   Maryuri Sultanaer P Catterlin, DO   lisinopril (PRINIVIL;ZESTRIL) 40 MG tablet take 1 tablet by mouth once daily 3/11/22   [facility] Catterlin, DO   metoprolol succinate (TOPROL XL) 100 MG extended release tablet take 1 tablet by mouth once daily 3/11/22   [facility] Catterlin, DO   citalopram (CELEXA) 20 MG tablet take 1 tablet by mouth once daily 3/11/22   Brayden Martel Catterlin, DO       Allergies: No Known Allergies    Social history: smoking: never ; Alcohol: rarely , work: no. ROS -  Card - no CP  GI - no N/V/D/C    PE -  Gen : BP (!) 163/87 (Site: Left Upper Arm, Position: Sitting, Cuff Size: Large Adult)   Pulse 56   Temp 98.4 °F (36.9 °C) (Temporal)   Ht 5' 1.5\" (1.562 m)   Wt 287 lb 6.4 oz (130.4 kg)   LMP  (LMP Unknown)   BMI 53.42 kg/m²    WN, WD, NAD  Lung: Nml resp effort, CTA B/L  Heart:  RRR w/o MGR, trace LE pitting edema b/l  Psych: Normal mood   Full affect  Neuro: Moves all ext well  ______________________    HISTORY & ASSESSMENT/PLAN -     Problem 1  - JOSE DANIEL   HPI   - On CPAP, getting good rest with CPAP, no acute issues  Assessment  - Controlled. Plan   - Continue CPAP, weight reduction can help, planned as follows    Problem 2  - Obesity   HPI   - See above Background for description    Weight  Date    287.4  8/24/22    Patient's estimated daily energy need (DEN) = 1780 Silver/d = 31126 Silver/wk    Assessment  - Uncontrolled    Plan   - Talked about various options. Would like calorie counting with low calorie diet as detailed below.  Would like to start without an appetite suppressant first. Also talked about risks of hypoglycemia while on Lantus, and planned to decrease Lantus dose to 12 units daily. Planned as follows:  Patient Instructions   Please decrease Insulin (Lantus) dose to 12 units nightly when starting low calorie diet as mentioned below (1200 Silver/day), and watch BGL closely. Rules:  Count every calorie every day (you can use free apps such as 'my fitness pal' or 'Douban')  Limit sweets to one day per month  Limit chips/crackers/pretzels/nuts/popcorn to 100 silver/day  Eliminate all sugar sweetened beverages (including fruit juice)  Limit restaurants (including fast food and food from a convenience store) to one time every two weeks while in town    Requirements:  Make sure protein intake is at least 65 grams per day (do not count protein every day; instead spot check your intake every 2-3 weeks and make sure what you think you are getting is close to accurate; consider using a protein shake if needed; these are in the pharmacy section of the stores, not the grocery section; Premier, Pure Protein and Fairlife are relatively inexpensive and taste good to most patients; other options are Nectar, Boost Max, Ensure Max, BeneProtein and GNC lean (which is lactose-free); Nectar fruit, Premier Protein Clear, IsoPure Protein Drink, and Protein 2 O are water-based options; Quest (or Cosco, which is cheaper and is ordered on [employer]) and the Oh YeFanvibe 1 protein bars can also be used, but have less protein in them ) (<200 Silver, >25 g protein) (chicken, fish, turkey, egg whites)  (Disclaimer: Dietary supplements rarely have their listed ingredients and the amount of each verified by a third party other. Sometimes they give verification for their claims to be GMO and gluten free and to be organic. However, even such verifications as these may still be untrustworthy.)  Make sure that fiber intake is at least 25 grams per day.  Do this by either eating 12 tablespoons of the original, plain Fiber One cereal every day or 4 tablespoons of wheat dextrin powder (Benefiber or a generic brand) every day. Work up to this amount slowly by starting with only one-eighth to one-fourth of the target amount and then adding another one-eighth to one-fourth every one or two weeks until reaching the target. Take one multivitamin every day    Targets:  Limit calorie intake to 1200 calories/day  Walk 30 minutes daily or equivalent (210 min/week)  Avoid eating 2 hours within bedtime. Tips:  Do not eat outside of the dining room or the kitchen  Do not eat while watching TV, videos, working on the computer or using a smart phone  Do not eat food out of a multi-serving bag or container    Follow up in 4 weeks. Total time spent on encounter: 60 min. Deomnd Nelsno MD  Internal Medicine/Obesity Medicine  8/24/2022.

## 2022-09-13 ENCOUNTER — OFFICE VISIT (OUTPATIENT)
Dept: RHEUMATOLOGY | Age: 62
End: 2022-09-13
Payer: MEDICAID

## 2022-09-13 VITALS
WEIGHT: 277 LBS | DIASTOLIC BLOOD PRESSURE: 68 MMHG | SYSTOLIC BLOOD PRESSURE: 122 MMHG | HEIGHT: 63 IN | BODY MASS INDEX: 49.08 KG/M2 | OXYGEN SATURATION: 96 % | HEART RATE: 65 BPM

## 2022-09-13 DIAGNOSIS — G89.29 CHRONIC BILATERAL LOW BACK PAIN WITH BILATERAL SCIATICA: ICD-10-CM

## 2022-09-13 DIAGNOSIS — M13.0 POLYARTHRITIS: ICD-10-CM

## 2022-09-13 DIAGNOSIS — M13.0 POLYARTHRITIS: Primary | ICD-10-CM

## 2022-09-13 DIAGNOSIS — M54.42 CHRONIC BILATERAL LOW BACK PAIN WITH BILATERAL SCIATICA: ICD-10-CM

## 2022-09-13 DIAGNOSIS — R76.8 RHEUMATOID FACTOR POSITIVE: ICD-10-CM

## 2022-09-13 DIAGNOSIS — M54.41 CHRONIC BILATERAL LOW BACK PAIN WITH BILATERAL SCIATICA: ICD-10-CM

## 2022-09-13 LAB
C-REACTIVE PROTEIN: 0.6 MG/DL (ref 0–0.4)
RHEUMATOID FACTOR: 29 IU/ML (ref 0–13)
SEDIMENTATION RATE, ERYTHROCYTE: 39 MM/HR (ref 0–20)

## 2022-09-13 PROCEDURE — G8427 DOCREV CUR MEDS BY ELIG CLIN: HCPCS | Performed by: INTERNAL MEDICINE

## 2022-09-13 PROCEDURE — 1036F TOBACCO NON-USER: CPT | Performed by: INTERNAL MEDICINE

## 2022-09-13 PROCEDURE — 3017F COLORECTAL CA SCREEN DOC REV: CPT | Performed by: INTERNAL MEDICINE

## 2022-09-13 PROCEDURE — G8417 CALC BMI ABV UP PARAM F/U: HCPCS | Performed by: INTERNAL MEDICINE

## 2022-09-13 PROCEDURE — 99204 OFFICE O/P NEW MOD 45 MIN: CPT | Performed by: INTERNAL MEDICINE

## 2022-09-13 ASSESSMENT — ENCOUNTER SYMPTOMS
SHORTNESS OF BREATH: 0
TROUBLE SWALLOWING: 0
DIARRHEA: 0
VOMITING: 0
COUGH: 0
BACK PAIN: 1
COLOR CHANGE: 0
ABDOMINAL PAIN: 0
NAUSEA: 0

## 2022-09-13 NOTE — PROGRESS NOTES
Sharla Mir 1960 is a 59 y.o. female, here for evaluation of the following chief complaint(s):  Consultation (Abnormal Sed rate)         ASSESSMENT/PLAN:    Madeline Kingsley 1960 is a 59 y.o. female seen in consult for polyarthritis and positive rheumatoid factor. 1.  Polyarthritis-I see no obvious synovitis on exam to suggest an underlying inflammatory arthritis. She does have a low positive rheumatoid factor of 24 which is not overly impressive. CCP is negative. KATY is negative. Inflammatory markers are borderline but she has several other reasons for elevated inflammatory markers including diabetes and her weight. I do not have an overly high suspicion for underlying rheumatoid or other inflammatory arthritis but will need further work-up as below. 2.  Positive rheumatoid factor-she has a positive rheumatoid factor of 24 which again is not overly impressive. I see no signs of synovitis on exam to suggest rheumatoid arthritis. CCP is negative. I think this is most likely insignificant but we will also look for other causes of positive rheumatoid factor. 3.  Chronic low back pain-rheumatoid arthritis spares the back. She is following with pain management. If work-up below is unrevealing she can follow-up with me on a as needed basis. If I see anything concerning on work-up below I will have her back for follow-up as appropriate. 1. Polyarthritis  -     Hepatitis C Antibody; Future  -     C-Reactive Protein; Future  -     Cyclic Citrul Peptide Antibody, IgG; Future  -     Sedimentation Rate; Future  -     Rheumatoid Factor; Future  -     Sjogren's Ab (SS-A, SS-B); Future  -     XR HAND LEFT (MIN 3 VIEWS); Future  -     XR HAND RIGHT (MIN 3 VIEWS); Future  -     XR FOOT LEFT (MIN 3 VIEWS); Future  -     XR FOOT RIGHT (MIN 3 VIEWS); Future  2. Rheumatoid factor positive  -     Hepatitis C Antibody; Future  -     C-Reactive Protein;  Future  -     Cyclic Citrul Peptide Antibody, IgG; Future  -     Sedimentation Rate; Future  -     Rheumatoid Factor; Future  -     Sjogren's Ab (SS-A, SS-B); Future  -     XR HAND LEFT (MIN 3 VIEWS); Future  -     XR HAND RIGHT (MIN 3 VIEWS); Future  -     XR FOOT LEFT (MIN 3 VIEWS); Future  -     XR FOOT RIGHT (MIN 3 VIEWS); Future  3. Chronic bilateral low back pain with bilateral sciatica      Return if symptoms worsen or fail to improve. Subjective   SUBJECTIVE/OBJECTIVE:    HPI: Sharla Mir 1960 is a 59 y.o. female seen in consult for polyarthritis and positive rheumatoid factor. Patient states she has had a lot of issues with pain and stiffness in the legs for some time. She is actually had her right knee replaced and states that she probably needs her left one replaced as well. She has issues with back pain and follows with pain management. She had a recent injection in July which was beneficial.  She does not complain of a whole lot of joint pain or swelling in the hands. She had some blood work done in May which showed a low positive rheumatoid factor of 24 and borderline inflammatory markers. CCP and KATY were negative. She has no extra-articular manifestations. Past Medical History:   Diagnosis Date    History of blood transfusion 2291    at [facility] complication after kidney stone surgery    History of kidney stones     Hx of cardiovascular stress test 12/17/2019    Lexiscan stress test    Hypertension     Hypothyroidism     Kidney disease     Obesity     Sleep apnea     Thyroid disease     Type 2 diabetes mellitus without complication ([facility] [address]         Review of Systems   Constitutional:  Negative for fatigue and fever. HENT:  Negative for mouth sores and trouble swallowing. Respiratory:  Negative for cough and shortness of breath. Cardiovascular:  Negative for chest pain. Gastrointestinal:  Negative for abdominal pain, diarrhea, nausea and vomiting. Genitourinary:  Negative for dysuria and hematuria.    Musculoskeletal: Positive for arthralgias, back pain and myalgias. Negative for joint swelling. Skin:  Negative for color change and rash. Neurological:  Negative for weakness and numbness. Hematological:  Negative for adenopathy. All other systems reviewed and are negative. Objective   Vitals:    09/13/22 1108   BP: 122/68   Pulse: 65   SpO2: 96%      Physical Exam  Constitutional:       General: She is not in acute distress. Appearance: Normal appearance. HENT:      Head: Normocephalic and atraumatic. Right Ear: External ear normal.      Left Ear: External ear normal.      Nose: Nose normal.   Eyes:      General: No scleral icterus. Pulmonary:      Effort: Pulmonary effort is normal.   Musculoskeletal:         General: No swelling, tenderness or deformity. Skin:     General: Skin is warm and dry. Findings: No rash. Neurological:      General: No focal deficit present. Mental Status: She is alert and oriented to person, place, and time. Mental status is at baseline.    Psychiatric:         Mood and Affect: Mood normal.         Behavior: Behavior normal.          Lab Results   Component Value Date    WBC 7.2 08/08/2022    HGB 12.7 08/08/2022    HCT 40.3 08/08/2022    MCV 88.2 08/08/2022     08/08/2022     Lab Results   Component Value Date     08/08/2022    K 4.2 08/08/2022     08/08/2022    CO2 27 08/08/2022    BUN 11 08/08/2022    CREATININE 0.8 08/08/2022    GLUCOSE 118 (H) 08/08/2022    CALCIUM 9.3 08/08/2022    PROT 7.7 08/08/2022    LABALBU 4.3 08/08/2022    BILITOT 0.4 08/08/2022    ALKPHOS 98 08/08/2022    AST 30 08/08/2022    ALT 26 08/08/2022    LABGLOM >60 08/08/2022    GFRAA >60 08/08/2022     Lab Results   Component Value Date    KATY NEGATIVE 05/10/2022     No results found for: RHEUMFACTOR  Lab Results   Component Value Date    SEDRATE 35 (H) 05/10/2022     Lab Results   Component Value Date    CRP 1.0 (H) 05/10/2022            An electronic signature was used to authenticate this note. This note was generated with a voice recognition dictation system. Please disregard any errors or omission which have escaped my review.     --Dennis Pfeiffer, DO

## 2022-09-15 LAB
ENA TO SSA (RO) ANTIBODY: NEGATIVE
ENA TO SSB (LA) ANTIBODY: NEGATIVE
HEPATITIS C ANTIBODY INTERPRETATION: NORMAL

## 2022-09-16 LAB — CYCLIC CITRULLINATED PEPTIDE ANTIBODY IGG: 1 U/ML (ref 0–7)

## 2022-09-30 ENCOUNTER — OFFICE VISIT (OUTPATIENT)
Dept: BARIATRICS/WEIGHT MGMT | Age: 62
End: 2022-09-30
Payer: MEDICAID

## 2022-09-30 VITALS
HEART RATE: 57 BPM | WEIGHT: 278.2 LBS | BODY MASS INDEX: 51.19 KG/M2 | DIASTOLIC BLOOD PRESSURE: 64 MMHG | SYSTOLIC BLOOD PRESSURE: 137 MMHG | TEMPERATURE: 97.7 F | HEIGHT: 62 IN

## 2022-09-30 DIAGNOSIS — G47.33 OSA (OBSTRUCTIVE SLEEP APNEA): Primary | ICD-10-CM

## 2022-09-30 DIAGNOSIS — E66.01 CLASS 3 SEVERE OBESITY DUE TO EXCESS CALORIES WITH SERIOUS COMORBIDITY AND BODY MASS INDEX (BMI) OF 50.0 TO 59.9 IN ADULT (HCC): ICD-10-CM

## 2022-09-30 PROBLEM — E66.813 CLASS 3 SEVERE OBESITY DUE TO EXCESS CALORIES WITH SERIOUS COMORBIDITY AND BODY MASS INDEX (BMI) OF 50.0 TO 59.9 IN ADULT: Status: ACTIVE | Noted: 2022-09-30

## 2022-09-30 PROCEDURE — 99211 OFF/OP EST MAY X REQ PHY/QHP: CPT

## 2022-09-30 PROCEDURE — 99213 OFFICE O/P EST LOW 20 MIN: CPT | Performed by: INTERNAL MEDICINE

## 2022-09-30 NOTE — PATIENT INSTRUCTIONS
Rules:  Count every calorie every day (you can use free apps such as 'my fitness pal' or 'baritastic')  Limit sweets to one day per month  Limit chips/crackers/pretzels/nuts/popcorn to 100 maurice/day  Eliminate all sugar sweetened beverages (including fruit juice)  Limit restaurants (including fast food and food from a convenience store) to one time every two weeks while in town    Requirements:  Make sure protein intake is at least 65 grams per day  Make sure that fiber intake is at least 25 grams per day  Take one multivitamin every day    Targets:  Limit calorie intake to 1200 calories/day  Walk 30 minutes daily or equivalent (210 min/week)  Avoid eating 2 hours within bedtime. Tips:  Do not eat outside of the dining room or the kitchen  Do not eat while watching TV, videos, working on the computer or using a smart phone  Do not eat food out of a multi-serving bag or container    Low calorie non-starchy vegetables:  Food (per 100 g) Calories Fibers T. Carbohydrates Protein Fat Sodium (mg) Potassium (mg)   Green Bell Peppers 10 1.7 4.6 0.9 0.2 3 175   Cucumbers 15 0.5 3.6 0.7 0.1 2 147   Celery 16 1.6 3 0.7 0.2 80 260   Tomatoes 18 1.2 3.9 0.9 0.2 5 237   Carrots 41 2.8 10 0.9 0.2 69 320   Cabbage 25 2.5 6 1.3 0.1 18 170   Broccoli 35 3.3 7.2 2.4 0.4 41 293   Cauliflower 23 2.3 4.1 1.8 0.5 15 142   Iceberg Lettuce 14 1.2 3 0.9 0.1 10 141   Kale 28 2 5.6 1.9 0.4 23 228   Brussel Sprouts 43 3.8 9 3.4 0.3 25 389   Spinach 23 2.4 3.8 3 0.3 70 466   Zucchini 15 1 2.7 1.1 0.4 3 264   Mushroom 28 2.2 5.3 2.2 0.5 2 356   Asparagus 22 2 4.1 2.4 0.2 14 224   Green Beans 35 3.2 7.9 1.9 0.3 1 146   Eggplant 35 2.5 8.7 0.8 0.2 1 123     Follow up in 6 weeks.

## 2022-09-30 NOTE — PROGRESS NOTES
CC -   Follow up of: JOSE DANIEL, weight gain    Would like to be at about 160-170 lbs    BACKGROUND -   Last visit: 8/24/22  First visit: 8/24/22    Obesity (all weight in lbs)  Began after having her son in 1995  Initial BMI 53.42, Wt 287.4, Ht 61.5\"  HS Grad wt 120   Lowest   wt 120   Highest  wt 299 (March 2022)  Pattern of wt gain: gradual  Wt change past yr: up and down, has lost about 12 lbs since March 2022  Most wt lost: 48 (in her 45s)  Other diets attempted: Saud's diet, knows about other diet but has had adhering to    Desire to lose weight: 10/10 (not thinking about surgery)    Initial Diet:    Number of meals per day - 1 (dinner)    Number of snacks per day - 1    Meal volume - 12\" plate,  never seconds    Fast food/convenience store - 0-1x/week    Restaurants (not fast food) - 0x/week   Sweets - 7d/week   Chips - 0d/week   Crackers/pretzels - 0d/week   Nuts - 0d/week   Peanut Butter - 7d/week (1 tsp)   Popcorn - 0d/week   Dried fruit - 0d/week   Whole fruit - 7d/week (eg small peach)   Breakfast cereal - 0d/week   Granola/Protein/Energy bar - 7d/week (kellogs 12 g protein 190 Silver, kind bar 170 Silver)   Sugar sweetened beverages - none. Sugar free root beer, adelfo dry cranberry niokle etc. No coffee/tea, no energy drink. Protein - Protein powder Ka'yony (25 g 240 Silver)   Fiber - No supplements    Wt effect of HR foods = 2100 Silver/wk = 300 Silver/d= 17% DEN = 31 lb/year.      Initial Exercise:    Gym membership - no    Walking - no    Running - no    Resistance - no    Aerobic class - no    ______________________    [facility] -  Past Medical History:   Diagnosis Date    History of blood transfusion 5256    at [facility] complication after kidney stone surgery    History of kidney stones     Hx of cardiovascular stress test 12/17/2019    Lexiscan stress test    Hypertension     Hypothyroidism     Kidney disease     Obesity     Sleep apnea     Thyroid disease     Type 2 diabetes mellitus without complication (HCC)    CPAP+  Past Surgical History:   Procedure Laterality Date    BREAST SURGERY      CHOLECYSTECTOMY, LAPAROSCOPIC N/A 5/18/2020    LAPAROSCOPIC CHOLECYSTECTOMY WITH INTRAOPERATIVE CHOLANGIOGRAM performed by Nelly Burkett MD at [address] [facility] (CERVIX STATUS UNKNOWN)      HYSTERECTOMY, VAGINAL      JOINT REPLACEMENT Right 2019    knee  [facility]    KIDNEY STONE SURGERY      incisional removal    PAIN MANAGEMENT PROCEDURE Bilateral 7/19/2022    LUMBAR TRANSFORAMINAL EPIDURAL STEROID INJECTION RIGHT L3 AND LEFT L4 UNDER FLUOROSCOPIC GUIDANCE (CPT 14589) performed by Arleth Kent MD at [address] [place] [address]     Prior to Admission medications    Medication Sig Start Date End Date Taking? Authorizing Provider   albuterol (PROVENTIL) (2.5 MG/3ML) 0.083% nebulizer solution Take 3 mLs by nebulization every 6 hours as needed for Wheezing or Shortness of Breath 8/10/22   Esperanza P Catterlin, DO   clotrimazole-betamethasone (LOTRISONE) 1-0.05 % cream Apply topically 3 times daily. 7/29/22   Esperanza P Catterlin, DO   blood glucose monitor strips Test 3 times a day & as needed for symptoms of irregular blood glucose. Dispense sufficient amount for indicated testing frequency plus additional to accommodate PRN testing needs. ONE TOUCH ULTRA. 6/22/22   Esperanza P Catterlin, DO   hydrocortisone 2.5 % cream Apply topically 2 times daily.  6/10/22   Esperanza P Catterlin, DO   albuterol sulfate HFA (PROVENTIL;VENTOLIN;PROAIR) 108 (90 Base) MCG/ACT inhaler Inhale 2 puffs into the lungs every 6 hours as needed for Wheezing or Shortness of Breath 6/10/22   Dorise Tanvir P Catluislin, DO   nabumetone (RELAFEN) 750 MG tablet Take 1 tablet by mouth 2 times daily as needed for Pain 5/31/22   Arleth Kent MD   vitamin D (ERGOCALCIFEROL) 1.25 MG (84219 UT) CAPS capsule Take 1 capsule by mouth once a week 5/13/22   Justin Joel DO   vitamin D (ERGOCALCIFEROL) 1.25 MG (41684 UT) CAPS capsule Take 1 capsule by mouth once a week 5/11/22   Esperanza P Catterlin, DO   pravastatin (PRAVACHOL) 40 MG tablet Take 1 tablet by mouth daily 4/5/22 10/2/22  Aleta Ode P Catterlin, DO   insulin glargine (LANTUS SOLOSTAR) 100 UNIT/ML injection pen Inject 20 Units into the skin nightly 3/11/22 9/7/22  Esperanza P Catterlin, DO   Insulin Pen Needle 31G X 5 MM MISC 1 each by Does not apply route daily 3/11/22   Eris Greenon Catterlin, DO   levothyroxine (SYNTHROID) 75 MCG tablet take 1 tablet by mouth once daily 3/11/22   Aleta Ode P Catterlin, DO   lisinopril (PRINIVIL;ZESTRIL) 40 MG tablet take 1 tablet by mouth once daily 3/11/22   Aleta Ode P Catterlin, DO   metoprolol succinate (TOPROL XL) 100 MG extended release tablet take 1 tablet by mouth once daily 3/11/22   Aleta Ode P Catterlin, DO   citalopram (CELEXA) 20 MG tablet take 1 tablet by mouth once daily 3/11/22   Eris Barney Catterlin, DO     9/30/22: Lantus to 12 units/nightly    Allergies: No Known Allergies    Social history: smoking: never ; Alcohol: rarely , work: no. ROS -  Card - no CP  GI - no N/V/D/C    PE -  Gen : /64 (Site: Left Upper Arm, Position: Sitting, Cuff Size: Large Adult)   Pulse 57   Temp 97.7 °F (36.5 °C) (Temporal)   Ht 5' 1.5\" (1.562 m)   Wt 278 lb 3.2 oz (126.2 kg)   LMP  (LMP Unknown)   BMI 51.71 kg/m²    WN, WD, NAD  Lung: Nml resp effort, CTA B/L  Heart:  RRR w/o MGR, trace LE pitting edema b/l  Psych: Normal mood   Full affect  Neuro: Moves all ext well  ______________________    HISTORY & ASSESSMENT/PLAN -     Problem 1  - JOSE DANIEL   HPI   - On CPAP, getting good rest with CPAP, no acute issues  Assessment  - Controlled. Plan   - Continue CPAP, weight reduction can help, planned as follows    Problem 2  - Obesity   HPI   - See above Background for description    Weight  Date    287.4  8/24/22    278.2  9/30/22    Total weight change to date: -9.2 lbs. Average daily energy variance:  8/24/2022 - 9/30/2022: -7.6 lbs (85699 Silver)/36 d = -739 Silver/d deficit.     Patient's estimated daily energy need (DEN) = 1780 Silver/d = 37563 Silver/wk    Update:  Calorie monitorind/wk, usual intake 1200 Silver/d  Sweets: 1d/month  SSB: none  Restaurant food: 1x/month  Appetite suppressant: None  Home BP monitoring: No  Protein: Kachava in am, protein bar in the afternoon, dinner at night  Fiber: not particularly (but the fitness pal says she is keeping up with fiber)  Water: 64 oz/day  Activities: planning to start walking (did it for a couple of days)    Assessment  - Uncontrolled->improving    Plan   - She has been doing very well with current plan. Does not feel the need for an appetite suppressant. Planning to start walking. Planned as follows:  Patient Instructions   Rules:  Count every calorie every day (you can use free apps such as 'my fitness pal' or 'baritastic')  Limit sweets to one day per month  Limit chips/crackers/pretzels/nuts/popcorn to 100 silver/day  Eliminate all sugar sweetened beverages (including fruit juice)  Limit restaurants (including fast food and food from a convenience store) to one time every two weeks while in town    Requirements:  Make sure protein intake is at least 65 grams per day  Make sure that fiber intake is at least 25 grams per day  Take one multivitamin every day    Targets:  Limit calorie intake to 1200 calories/day  Walk 30 minutes daily or equivalent (210 min/week)  Avoid eating 2 hours within bedtime. Tips:  Do not eat outside of the dining room or the kitchen  Do not eat while watching TV, videos, working on the computer or using a smart phone  Do not eat food out of a multi-serving bag or container    List of low calorie non-starchy vegetables was provided. Follow up in 6 weeks. Total time spent on encounter: 20 min. Emma Pineda MD  Internal Medicine/Obesity Medicine  2022.

## 2022-10-07 ENCOUNTER — HOSPITAL ENCOUNTER (OUTPATIENT)
Dept: GENERAL RADIOLOGY | Age: 62
Discharge: HOME | End: 2022-10-09
Payer: MEDICAID

## 2022-10-07 ENCOUNTER — HOSPITAL ENCOUNTER (OUTPATIENT)
Age: 62
Discharge: HOME | End: 2022-10-09
Payer: MEDICAID

## 2022-10-07 DIAGNOSIS — R76.8 RHEUMATOID FACTOR POSITIVE: ICD-10-CM

## 2022-10-07 DIAGNOSIS — M13.0 POLYARTHRITIS: ICD-10-CM

## 2022-10-07 PROCEDURE — 73630 X-RAY EXAM OF FOOT: CPT

## 2022-10-07 PROCEDURE — 73130 X-RAY EXAM OF HAND: CPT

## 2022-10-10 DIAGNOSIS — L30.9 DERMATITIS: ICD-10-CM

## 2022-11-10 DIAGNOSIS — I10 ESSENTIAL HYPERTENSION: ICD-10-CM

## 2022-11-11 ENCOUNTER — TELEPHONE (OUTPATIENT)
Dept: BARIATRICS/WEIGHT MGMT | Age: 62
End: 2022-11-11

## 2022-11-11 RX ORDER — METOPROLOL SUCCINATE 100 MG/1
TABLET, EXTENDED RELEASE ORAL
Qty: 90 TABLET | Refills: 1 | Status: SHIPPED | OUTPATIENT
Start: 2022-11-11

## 2022-11-11 RX ORDER — PRAVASTATIN SODIUM 40 MG
40 TABLET ORAL DAILY
Qty: 90 TABLET | Refills: 1 | Status: SHIPPED | OUTPATIENT
Start: 2022-11-11 | End: 2023-05-10

## 2022-11-15 DIAGNOSIS — E11.9 TYPE 2 DIABETES MELLITUS WITHOUT COMPLICATION, WITH LONG-TERM CURRENT USE OF INSULIN (HCC): ICD-10-CM

## 2022-11-15 DIAGNOSIS — I10 ESSENTIAL HYPERTENSION: ICD-10-CM

## 2022-11-15 DIAGNOSIS — E03.9 HYPOTHYROIDISM, UNSPECIFIED TYPE: ICD-10-CM

## 2022-11-15 DIAGNOSIS — Z79.4 TYPE 2 DIABETES MELLITUS WITHOUT COMPLICATION, WITH LONG-TERM CURRENT USE OF INSULIN (HCC): ICD-10-CM

## 2022-11-15 DIAGNOSIS — F32.A DEPRESSION, UNSPECIFIED DEPRESSION TYPE: ICD-10-CM

## 2022-11-16 RX ORDER — LISINOPRIL 40 MG/1
TABLET ORAL
Qty: 90 TABLET | Refills: 1 | Status: SHIPPED | OUTPATIENT
Start: 2022-11-16

## 2022-11-16 RX ORDER — NABUMETONE 750 MG/1
750 TABLET, FILM COATED ORAL 2 TIMES DAILY PRN
Qty: 30 TABLET | Refills: 1 | OUTPATIENT
Start: 2022-11-16

## 2022-11-16 RX ORDER — LEVOTHYROXINE SODIUM 0.07 MG/1
TABLET ORAL
Qty: 90 TABLET | Refills: 1 | Status: SHIPPED | OUTPATIENT
Start: 2022-11-16

## 2022-11-16 RX ORDER — CITALOPRAM 20 MG/1
TABLET ORAL
Qty: 90 TABLET | Refills: 1 | Status: SHIPPED | OUTPATIENT
Start: 2022-11-16

## 2023-01-10 ENCOUNTER — TELEPHONE (OUTPATIENT)
Dept: BREAST CENTER | Age: 63
End: 2023-01-10

## 2023-01-10 NOTE — TELEPHONE ENCOUNTER
RN received call from Manolo in regards to her upcoming imaging and appointment on 1/11/2023. Patients fathers tested positive for COVID and patient is running a fever and feeling sick. Patient advised Tiffany Bar that she would call office back to reschedule once she is feeling better.            Electronically signed by Steff Angel RN on 1/10/23 at 1:40 PM EST

## 2023-01-18 DIAGNOSIS — G47.33 OBSTRUCTIVE SLEEP APNEA: Primary | ICD-10-CM

## 2023-02-13 DIAGNOSIS — L30.9 DERMATITIS: ICD-10-CM

## 2023-02-20 ENCOUNTER — TELEPHONE (OUTPATIENT)
Dept: BREAST CENTER | Age: 63
End: 2023-02-20

## 2023-02-20 NOTE — TELEPHONE ENCOUNTER
RN attempt to contact patient to see if she was ready to reschedule canceled appointment. RN reached patient VM and left detailed message of why was calling and office number for patient to call office back and set up appt.         Electronically signed by Juan Godoy RN on 2/20/23 at 1:49 PM EST

## 2023-02-20 NOTE — TELEPHONE ENCOUNTER
----- Message from Heather Harris RN sent at 1/10/2023  1:42 PM EST -----  Patient was scheduled for ESTABLISHED:  Bilateral masses - LIMITED BILATERAL ULTRASOUNDS on 1/11/2023. Patient called in on 1/10/2023 sick. If patient hasnt called office back to reschedule please call and see if ready to reschedule.        Electronically signed by Heather Harris RN on 1/10/23 at 1:43 PM EST

## 2023-03-01 DIAGNOSIS — M25.561 PAIN IN BOTH KNEES, UNSPECIFIED CHRONICITY: Primary | ICD-10-CM

## 2023-03-01 DIAGNOSIS — M25.562 PAIN IN BOTH KNEES, UNSPECIFIED CHRONICITY: Primary | ICD-10-CM

## 2023-03-03 ENCOUNTER — HOSPITAL ENCOUNTER (EMERGENCY)
Age: 63
Discharge: HOME | End: 2023-03-03
Attending: EMERGENCY MEDICINE
Payer: MEDICAID

## 2023-03-03 VITALS
SYSTOLIC BLOOD PRESSURE: 158 MMHG | DIASTOLIC BLOOD PRESSURE: 98 MMHG | OXYGEN SATURATION: 96 % | HEART RATE: 56 BPM | RESPIRATION RATE: 16 BRPM | TEMPERATURE: 97.1 F

## 2023-03-03 DIAGNOSIS — H61.20 IMPACTED CERUMEN, UNSPECIFIED LATERALITY: Primary | ICD-10-CM

## 2023-03-03 PROCEDURE — 99283 EMERGENCY DEPT VISIT LOW MDM: CPT

## 2023-03-03 ASSESSMENT — ENCOUNTER SYMPTOMS
CHOKING: 0
COUGH: 0
WHEEZING: 0
BACK PAIN: 0

## 2023-03-03 ASSESSMENT — PAIN DESCRIPTION - DESCRIPTORS: DESCRIPTORS: SHARP

## 2023-03-03 ASSESSMENT — PAIN DESCRIPTION - ORIENTATION: ORIENTATION: RIGHT

## 2023-03-03 ASSESSMENT — PAIN SCALES - GENERAL: PAINLEVEL_OUTOF10: 6

## 2023-03-03 ASSESSMENT — PAIN - FUNCTIONAL ASSESSMENT: PAIN_FUNCTIONAL_ASSESSMENT: 0-10

## 2023-03-03 ASSESSMENT — PAIN DESCRIPTION - LOCATION: LOCATION: EAR

## 2023-03-03 NOTE — PROGRESS NOTES
nipple, mass, nipple discharge, skin change or tenderness. Comments: She has multiple, mobile cystic type densities bilaterally which remain clinically stable; no skin changes. No nipple discharge, no axillary adenopathy. No clinical evidence to suggest malignancy. Musculoskeletal:         General: No tenderness or deformity. Normal range of motion. Right shoulder: Normal.      Left shoulder: Normal.      Cervical back: Normal range of motion and neck supple. Right lower leg: Edema (Chronic bilateral edema of the lower extremities; stable.) present. Left lower leg: Edema present. Lymphadenopathy:      Cervical: No cervical adenopathy. Right cervical: No superficial, deep or posterior cervical adenopathy. Left cervical: No superficial, deep or posterior cervical adenopathy. Upper Body:      Right upper body: No pectoral adenopathy. Left upper body: No pectoral adenopathy. Skin:     General: Skin is warm and dry. Coloration: Skin is not pale. Findings: No erythema or rash. Neurological:      Mental Status: She is alert and oriented to person, place, and time. Coordination: Coordination normal.   Psychiatric:         Behavior: Behavior normal.         Thought Content: Thought content normal.         Judgment: Judgment normal.      Assessment:    Sharla is a izabella 57 y.o. woman who's risk for breast cancer include gender and elevated BMI. She presents today without breast related complaints for follow up of her benign appearing bilateral breast masses.     Initial imaging @ [facility]:  02/23/2021 bilateral well-circumscribed masses, most likely cysts BI-RADS 0.  03/04/2021 bilateral breast US: Multiple bilateral cysts, including mildly complex cyst at the 12 o'clock position of the right breast.  Probably benign, BI-RADS 3    Imaging to date @ Health system:  Chris LOMELI 6.7%  Grade C breast density  06/17/2021 bilateral DX mammogram/bilateral breast

## 2023-03-03 NOTE — ED PROVIDER NOTES
HPI:  3/3/23,   Time: 12:02 PM INDIO Mir is a 57 y.o. female presenting to the ED for complaint: Pain in both ears with drainage from right ear, beginning 1 day ago. The complaint has been persistent, mild in severity, and worsened by nothing. Patient denies fever chest pain cough    ROS:   Pertinent positives and negatives are stated within HPI, all other systems reviewed and are negative.  --------------------------------------------- PAST HISTORY ---------------------------------------------  Past Medical History:  has a past medical history of History of blood transfusion, History of kidney stones, Hx of cardiovascular stress test, Hypertension, Hypothyroidism, Kidney disease, Obesity, Sleep apnea, Thyroid disease, and Type 2 diabetes mellitus without complication ([facility] [address] Past Surgical History:  has a past surgical history that includes Hysterectomy; joint replacement (Right, 2019); Kidney stone surgery; hernia repair; Cholecystectomy, laparoscopic (N/A, 5/18/2020); Hysterectomy, vaginal; Breast surgery; and Pain management procedure (Bilateral, 7/19/2022). Social History:  reports that she has never smoked. She has never used smokeless tobacco. She reports that she does not currently use alcohol. She reports that she does not use drugs. Family History: family history includes COPD in her father; Drema Right in her mother. The patients home medications have been reviewed. Allergies: Patient has no known allergies. -------------------------------------------------- RESULTS -------------------------------------------------  All laboratory and radiology results have been personally reviewed by myself   LABS:  No results found for this visit on 03/03/23.     RADIOLOGY:  Interpreted by Radiologist.  No orders to display       ------------------------- NURSING NOTES AND VITALS REVIEWED ---------------------------   The nursing notes within the ED encounter and vital signs as below have been reviewed. BP (!) 158/98   Pulse 56   Temp 97.1 °F (36.2 °C) (Temporal)   Resp 16   LMP  (LMP Unknown)   SpO2 96%   Oxygen Saturation Interpretation: Normal      ---------------------------------------------------PHYSICAL EXAM--------------------------------------      Constitutional/General: Alert and oriented x3, well appearing, non toxic in NAD  Head: NC/AT  Eyes: PERRL, EOMI  Ears: Bilateral cerumen positive  Mouth: Oropharynx clear, handling secretions, no trismus  Neck: Supple, full ROM, no meningeal signs  Pulmonary: Lungs clear to auscultation bilaterally, no wheezes, rales, or rhonchi. Not in respiratory distress  Cardiovascular:  Regular rate and rhythm, no murmurs, gallops, or rubs. 2+ distal pulses  Abdomen: Soft, non tender, non distended,   Extremities: Moves all extremities x 4. Warm and well perfused  Skin: warm and dry without rash  Neurologic: GCS 15,  Psych: Normal Affect      ------------------------------ ED COURSE/MEDICAL DECISION MAKING----------------------  Medications - No data to display      Medical Decision Making:    Patient given prescription for Debrox. Follow-up with PCP. Patient's Medications   New Prescriptions    CARBAMIDE PEROXIDE (DEBROX) 6.5 % OTIC SOLUTION    Place 5 drops into both ears 2 times daily   Previous Medications    ALBUTEROL (PROVENTIL) (2.5 MG/3ML) 0.083% NEBULIZER SOLUTION    Take 3 mLs by nebulization every 6 hours as needed for Wheezing or Shortness of Breath    ALBUTEROL SULFATE HFA (PROVENTIL;VENTOLIN;PROAIR) 108 (90 BASE) MCG/ACT INHALER    Inhale 2 puffs into the lungs every 6 hours as needed for Wheezing or Shortness of Breath    BLOOD GLUCOSE MONITOR STRIPS    Test 3 times a day & as needed for symptoms of irregular blood glucose. Dispense sufficient amount for indicated testing frequency plus additional to accommodate PRN testing needs. ONE TOUCH ULTRA.     CITALOPRAM (CELEXA) 20 MG TABLET    take 1 tablet by mouth once daily CLOTRIMAZOLE-BETAMETHASONE (LOTRISONE) 1-0.05 % CREAM    Apply topically 3 times daily. HYDROCORTISONE 2.5 % CREAM    Apply topically 2 times daily. INSULIN GLARGINE (LANTUS SOLOSTAR) 100 UNIT/ML INJECTION PEN    Inject 20 Units into the skin nightly    INSULIN PEN NEEDLE 31G X 5 MM MISC    1 each by Does not apply route daily    LEVOTHYROXINE (SYNTHROID) 75 MCG TABLET    take 1 tablet by mouth once daily    LISINOPRIL (PRINIVIL;ZESTRIL) 40 MG TABLET    take 1 tablet by mouth once daily    METOPROLOL SUCCINATE (TOPROL XL) 100 MG EXTENDED RELEASE TABLET    take 1 tablet by mouth once daily    PRAVASTATIN (PRAVACHOL) 40 MG TABLET    Take 1 tablet by mouth daily    VITAMIN D (ERGOCALCIFEROL) 1.25 MG (67506 UT) CAPS CAPSULE    Take 1 capsule by mouth once a week    VITAMIN D (ERGOCALCIFEROL) 1.25 MG (71642 UT) CAPS CAPSULE    Take 1 capsule by mouth once a week   Modified Medications    No medications on file   Discontinued Medications    No medications on file         Counseling: The emergency provider has spoken with the patient and discussed todays results, in addition to providing specific details for the plan of care and counseling regarding the diagnosis and prognosis. Questions are answered at this time and they are agreeable with the plan.      --------------------------------- IMPRESSION AND DISPOSITION ---------------------------------    IMPRESSION  1.  Impacted cerumen, unspecified laterality        DISPOSITION  Disposition: Discharge to home  Patient condition is good                  Cherelle Meadows MD  03/03/23 0403

## 2023-03-15 DIAGNOSIS — Z79.4 TYPE 2 DIABETES MELLITUS WITHOUT COMPLICATION, WITH LONG-TERM CURRENT USE OF INSULIN (HCC): ICD-10-CM

## 2023-03-15 DIAGNOSIS — E11.9 TYPE 2 DIABETES MELLITUS WITHOUT COMPLICATION, WITH LONG-TERM CURRENT USE OF INSULIN (HCC): ICD-10-CM

## 2023-03-16 DIAGNOSIS — Z79.4 TYPE 2 DIABETES MELLITUS WITHOUT COMPLICATION, WITH LONG-TERM CURRENT USE OF INSULIN (HCC): ICD-10-CM

## 2023-03-16 DIAGNOSIS — E11.9 TYPE 2 DIABETES MELLITUS WITHOUT COMPLICATION, WITH LONG-TERM CURRENT USE OF INSULIN (HCC): ICD-10-CM

## 2023-03-16 NOTE — TELEPHONE ENCOUNTER
This MA attempted to reach pt. No answer. Lm for patient to schedule appt.      Electronically signed by Mikey Shah MA on 3/16/23 at 9:11 AM EDT     Last seen 6/10/2022  Next appt Visit date not found

## 2023-03-17 RX ORDER — INSULIN GLARGINE 100 [IU]/ML
20 INJECTION, SOLUTION SUBCUTANEOUS NIGHTLY
Qty: 18 ML | Refills: 1 | OUTPATIENT
Start: 2023-03-17 | End: 2023-09-13

## 2023-03-17 RX ORDER — INSULIN GLARGINE 100 [IU]/ML
20 INJECTION, SOLUTION SUBCUTANEOUS NIGHTLY
Qty: 18 ML | Refills: 1 | Status: SHIPPED | OUTPATIENT
Start: 2023-03-17 | End: 2023-09-13

## 2023-03-29 DIAGNOSIS — M25.561 RIGHT KNEE PAIN, UNSPECIFIED CHRONICITY: ICD-10-CM

## 2023-03-29 DIAGNOSIS — M25.562 LEFT KNEE PAIN, UNSPECIFIED CHRONICITY: Primary | ICD-10-CM

## 2023-03-30 ENCOUNTER — HOSPITAL ENCOUNTER (OUTPATIENT)
Dept: GENERAL RADIOLOGY | Age: 63
Discharge: HOME | End: 2023-04-01
Payer: MEDICAID

## 2023-03-30 ENCOUNTER — OFFICE VISIT (OUTPATIENT)
Dept: BREAST CENTER | Age: 63
End: 2023-03-30
Payer: MEDICAID

## 2023-03-30 VITALS
WEIGHT: 277 LBS | SYSTOLIC BLOOD PRESSURE: 142 MMHG | RESPIRATION RATE: 14 BRPM | TEMPERATURE: 98.4 F | DIASTOLIC BLOOD PRESSURE: 84 MMHG | HEART RATE: 64 BPM | OXYGEN SATURATION: 96 % | HEIGHT: 61 IN | BODY MASS INDEX: 52.3 KG/M2

## 2023-03-30 DIAGNOSIS — N60.01 BILATERAL BREAST CYSTS: ICD-10-CM

## 2023-03-30 DIAGNOSIS — N60.02 BILATERAL BREAST CYSTS: Primary | ICD-10-CM

## 2023-03-30 DIAGNOSIS — N60.02 BILATERAL BREAST CYSTS: ICD-10-CM

## 2023-03-30 DIAGNOSIS — N60.01 BILATERAL BREAST CYSTS: Primary | ICD-10-CM

## 2023-03-30 PROCEDURE — G8484 FLU IMMUNIZE NO ADMIN: HCPCS | Performed by: NURSE PRACTITIONER

## 2023-03-30 PROCEDURE — 76642 ULTRASOUND BREAST LIMITED: CPT

## 2023-03-30 PROCEDURE — 3077F SYST BP >= 140 MM HG: CPT | Performed by: NURSE PRACTITIONER

## 2023-03-30 PROCEDURE — 3079F DIAST BP 80-89 MM HG: CPT | Performed by: NURSE PRACTITIONER

## 2023-03-30 PROCEDURE — 1036F TOBACCO NON-USER: CPT | Performed by: NURSE PRACTITIONER

## 2023-03-30 PROCEDURE — G8417 CALC BMI ABV UP PARAM F/U: HCPCS | Performed by: NURSE PRACTITIONER

## 2023-03-30 PROCEDURE — 99213 OFFICE O/P EST LOW 20 MIN: CPT | Performed by: NURSE PRACTITIONER

## 2023-03-30 PROCEDURE — G8427 DOCREV CUR MEDS BY ELIG CLIN: HCPCS | Performed by: NURSE PRACTITIONER

## 2023-03-30 PROCEDURE — G0279 TOMOSYNTHESIS, MAMMO: HCPCS

## 2023-03-30 PROCEDURE — 3017F COLORECTAL CA SCREEN DOC REV: CPT | Performed by: NURSE PRACTITIONER

## 2023-03-30 ASSESSMENT — ENCOUNTER SYMPTOMS: SHORTNESS OF BREATH: 1

## 2023-04-03 ENCOUNTER — OFFICE VISIT (OUTPATIENT)
Dept: ORTHOPEDIC SURGERY | Age: 63
End: 2023-04-03

## 2023-04-03 VITALS — HEIGHT: 61 IN | TEMPERATURE: 98 F | BODY MASS INDEX: 52.3 KG/M2 | WEIGHT: 277 LBS

## 2023-04-03 DIAGNOSIS — M17.12 PRIMARY OSTEOARTHRITIS OF LEFT KNEE: ICD-10-CM

## 2023-04-03 DIAGNOSIS — M17.11 PRIMARY OSTEOARTHRITIS OF RIGHT KNEE: Primary | ICD-10-CM

## 2023-04-03 NOTE — PROGRESS NOTES
CHOLECYSTECTOMY WITH INTRAOPERATIVE CHOLANGIOGRAM performed by Simeon Anguiano MD at [address] (CERVIX STATUS UNKNOWN)      HYSTERECTOMY, VAGINAL      JOINT REPLACEMENT Right 2019    knee  [facility]    KIDNEY STONE SURGERY      incisional removal    PAIN MANAGEMENT PROCEDURE Bilateral 7/19/2022    LUMBAR TRANSFORAMINAL EPIDURAL STEROID INJECTION RIGHT L3 AND LEFT L4 UNDER FLUOROSCOPIC GUIDANCE (CPT 98112) performed by Aaron Langford MD at [address] [place] [address]       Current Outpatient Medications:     insulin glargine (LANTUS SOLOSTAR) 100 UNIT/ML injection pen, Inject 20 Units into the skin nightly, Disp: 18 mL, Rfl: 1    hydrocortisone 2.5 % cream, Apply topically 2 times daily. , Disp: 28 g, Rfl: 1    Insulin Pen Needle 31G X 5 MM MISC, 1 each by Does not apply route daily, Disp: 100 each, Rfl: 3    levothyroxine (SYNTHROID) 75 MCG tablet, take 1 tablet by mouth once daily, Disp: 90 tablet, Rfl: 1    lisinopril (PRINIVIL;ZESTRIL) 40 MG tablet, take 1 tablet by mouth once daily, Disp: 90 tablet, Rfl: 1    citalopram (CELEXA) 20 MG tablet, take 1 tablet by mouth once daily, Disp: 90 tablet, Rfl: 1    metoprolol succinate (TOPROL XL) 100 MG extended release tablet, take 1 tablet by mouth once daily, Disp: 90 tablet, Rfl: 1    pravastatin (PRAVACHOL) 40 MG tablet, Take 1 tablet by mouth daily, Disp: 90 tablet, Rfl: 1    blood glucose monitor strips, Test 3 times a day & as needed for symptoms of irregular blood glucose. Dispense sufficient amount for indicated testing frequency plus additional to accommodate PRN testing needs.  ONE TOUCH ULTRA., Disp: 300 strip, Rfl: 1    vitamin D (ERGOCALCIFEROL) 1.25 MG (13071 UT) CAPS capsule, Take 1 capsule by mouth once a week, Disp: 12 capsule, Rfl: 1  No Known Allergies  Social History     Socioeconomic History    Marital status:      Spouse name: Not on file    Number of children: Not on file    Years of education: Not on file

## 2023-04-05 RX ORDER — TRIAMCINOLONE ACETONIDE 40 MG/ML
40 INJECTION, SUSPENSION INTRA-ARTICULAR; INTRAMUSCULAR ONCE
Status: SHIPPED | OUTPATIENT
Start: 2023-04-05

## 2023-04-07 ENCOUNTER — APPOINTMENT (OUTPATIENT)
Dept: CT IMAGING | Age: 63
End: 2023-04-07
Payer: MEDICAID

## 2023-04-07 ENCOUNTER — HOSPITAL ENCOUNTER (EMERGENCY)
Age: 63
Discharge: HOME | End: 2023-04-07
Payer: MEDICAID

## 2023-04-07 VITALS
SYSTOLIC BLOOD PRESSURE: 215 MMHG | BODY MASS INDEX: 52.91 KG/M2 | TEMPERATURE: 97.8 F | RESPIRATION RATE: 18 BRPM | HEART RATE: 65 BPM | WEIGHT: 280 LBS | OXYGEN SATURATION: 95 % | DIASTOLIC BLOOD PRESSURE: 99 MMHG

## 2023-04-07 DIAGNOSIS — R73.09 ELEVATED HEMOGLOBIN A1C: ICD-10-CM

## 2023-04-07 DIAGNOSIS — R10.12 LEFT UPPER QUADRANT ABDOMINAL PAIN: Primary | ICD-10-CM

## 2023-04-07 DIAGNOSIS — N20.0 KIDNEY STONE: ICD-10-CM

## 2023-04-07 LAB
ALBUMIN SERPL-MCNC: 4.3 G/DL (ref 3.5–5.2)
ALP SERPL-CCNC: 76 U/L (ref 35–104)
ALT SERPL-CCNC: 14 U/L (ref 0–32)
ANION GAP SERPL CALCULATED.3IONS-SCNC: 10 MMOL/L (ref 7–16)
AST SERPL-CCNC: 18 U/L (ref 0–31)
BACTERIA URNS QL MICRO: ABNORMAL /HPF
BASOPHILS # BLD: 0.07 E9/L (ref 0–0.2)
BASOPHILS NFR BLD: 0.6 % (ref 0–2)
BILIRUB SERPL-MCNC: 0.5 MG/DL (ref 0–1.2)
BILIRUB UR QL STRIP: NEGATIVE
BUN SERPL-MCNC: 16 MG/DL (ref 6–23)
CALCIUM SERPL-MCNC: 9.1 MG/DL (ref 8.6–10.2)
CHLORIDE SERPL-SCNC: 106 MMOL/L (ref 98–107)
CLARITY UR: CLEAR
CO2 SERPL-SCNC: 25 MMOL/L (ref 22–29)
COLOR UR: YELLOW
CREAT SERPL-MCNC: 0.8 MG/DL (ref 0.5–1)
EOSINOPHIL # BLD: 0.14 E9/L (ref 0.05–0.5)
EOSINOPHIL NFR BLD: 1.3 % (ref 0–6)
EPI CELLS #/AREA URNS HPF: ABNORMAL /HPF
ERYTHROCYTE [DISTWIDTH] IN BLOOD BY AUTOMATED COUNT: 15.3 FL (ref 11.5–15)
GLUCOSE SERPL-MCNC: 89 MG/DL (ref 74–99)
GLUCOSE UR STRIP-MCNC: NEGATIVE MG/DL
HCT VFR BLD AUTO: 43.4 % (ref 34–48)
HGB BLD-MCNC: 13.7 G/DL (ref 11.5–15.5)
HGB UR QL STRIP: ABNORMAL
IMM GRANULOCYTES # BLD: 0.11 E9/L
IMM GRANULOCYTES NFR BLD: 1 % (ref 0–5)
KETONES UR STRIP-MCNC: NEGATIVE MG/DL
LACTATE BLDV-SCNC: 1.2 MMOL/L (ref 0.5–2.2)
LEUKOCYTE ESTERASE UR QL STRIP: NEGATIVE
LYMPHOCYTES # BLD: 2.97 E9/L (ref 1.5–4)
LYMPHOCYTES NFR BLD: 26.7 % (ref 20–42)
MCH RBC QN AUTO: 27.5 PG (ref 26–35)
MCHC RBC AUTO-ENTMCNC: 31.6 % (ref 32–34.5)
MCV RBC AUTO: 87.1 FL (ref 80–99.9)
MONOCYTES # BLD: 0.91 E9/L (ref 0.1–0.95)
MONOCYTES NFR BLD: 8.2 % (ref 2–12)
NEUTROPHILS # BLD: 6.94 E9/L (ref 1.8–7.3)
NEUTS SEG NFR BLD: 62.2 % (ref 43–80)
NITRITE UR QL STRIP: NEGATIVE
PH UR STRIP: 6 [PH] (ref 5–9)
PLATELET # BLD AUTO: 220 E9/L (ref 130–450)
PMV BLD AUTO: 11.3 FL (ref 7–12)
POTASSIUM SERPL-SCNC: 4.1 MMOL/L (ref 3.5–5)
PROT SERPL-MCNC: 7.4 G/DL (ref 6.4–8.3)
PROT UR STRIP-MCNC: NEGATIVE MG/DL
RBC # BLD AUTO: 4.98 E12/L (ref 3.5–5.5)
RBC #/AREA URNS HPF: ABNORMAL /HPF (ref 0–2)
SODIUM SERPL-SCNC: 141 MMOL/L (ref 132–146)
SP GR UR STRIP: 1.02 (ref 1–1.03)
UROBILINOGEN UR STRIP-ACNC: 0.2 E.U./DL
WBC # BLD: 11.1 E9/L (ref 4.5–11.5)
WBC #/AREA URNS HPF: ABNORMAL /HPF (ref 0–5)

## 2023-04-07 PROCEDURE — 83605 ASSAY OF LACTIC ACID: CPT

## 2023-04-07 PROCEDURE — 81001 URINALYSIS AUTO W/SCOPE: CPT

## 2023-04-07 PROCEDURE — 6360000004 HC RX CONTRAST MEDICATION: Performed by: RADIOLOGY

## 2023-04-07 PROCEDURE — 85025 COMPLETE CBC W/AUTO DIFF WBC: CPT

## 2023-04-07 PROCEDURE — 2580000003 HC RX 258: Performed by: PHYSICIAN ASSISTANT

## 2023-04-07 PROCEDURE — 74177 CT ABD & PELVIS W/CONTRAST: CPT

## 2023-04-07 PROCEDURE — 80053 COMPREHEN METABOLIC PANEL: CPT

## 2023-04-07 RX ORDER — HYDROCODONE BITARTRATE AND ACETAMINOPHEN 5; 325 MG/1; MG/1
1 TABLET ORAL EVERY 6 HOURS PRN
Qty: 12 TABLET | Refills: 0 | Status: SHIPPED | OUTPATIENT
Start: 2023-04-07 | End: 2023-04-10

## 2023-04-07 RX ORDER — 0.9 % SODIUM CHLORIDE 0.9 %
1000 INTRAVENOUS SOLUTION INTRAVENOUS ONCE
Status: COMPLETED | OUTPATIENT
Start: 2023-04-07 | End: 2023-04-07

## 2023-04-07 RX ADMIN — IOPAMIDOL 75 ML: 755 INJECTION, SOLUTION INTRAVENOUS at 15:56

## 2023-04-07 RX ADMIN — SODIUM CHLORIDE 1000 ML: 9 INJECTION, SOLUTION INTRAVENOUS at 13:35

## 2023-04-07 ASSESSMENT — PAIN - FUNCTIONAL ASSESSMENT: PAIN_FUNCTIONAL_ASSESSMENT: 0-10

## 2023-04-07 ASSESSMENT — PAIN DESCRIPTION - FREQUENCY: FREQUENCY: CONTINUOUS

## 2023-04-07 ASSESSMENT — PAIN DESCRIPTION - ORIENTATION: ORIENTATION: LEFT

## 2023-04-07 ASSESSMENT — PAIN DESCRIPTION - DESCRIPTORS: DESCRIPTORS: BURNING

## 2023-04-07 ASSESSMENT — LIFESTYLE VARIABLES: HOW MANY STANDARD DRINKS CONTAINING ALCOHOL DO YOU HAVE ON A TYPICAL DAY: PATIENT DOES NOT DRINK

## 2023-04-07 ASSESSMENT — PAIN DESCRIPTION - LOCATION: LOCATION: ABDOMEN;FLANK

## 2023-04-07 ASSESSMENT — PAIN SCALES - GENERAL: PAINLEVEL_OUTOF10: 5

## 2023-04-07 NOTE — ED PROVIDER NOTES
full ROM, non tender to palpation in the midline. Respiratory: Lungs clear to auscultation bilaterally, no wheezes, rales, or rhonchi. Not in respiratory distress  CV:  Regular rate. Regular rhythm. No murmurs, gallops, or rubs. 2+ distal pulses  Chest: No chest wall tenderness  GI:  Abdomen Soft,  Non distended. +BS. No flank pain. Pt is point tender LUQ. No rebound, guarding, or rigidity. No pulsatile masses. Musculoskeletal: Moves all extremities x 4. Warm and well perfused, no clubbing, cyanosis, or edema. Integument: skin warm and dry. No rashes.    Lymphatic: no lymphadenopathy noted  Neurologic: GCS 15, no focal deficits, symmetric strength 5/5 in the upper and lower extremities bilaterally  Psychiatric: Normal Affect    DIAGNOSTIC RESULTS   (All laboratory and radiology results have been personally reviewed by myself)  Labs:  Results for orders placed or performed during the hospital encounter of 04/07/23   CBC with Auto Differential   Result Value Ref Range    WBC 11.1 4.5 - 11.5 E9/L    RBC 4.98 3.50 - 5.50 E12/L    Hemoglobin 13.7 11.5 - 15.5 g/dL    Hematocrit 43.4 34.0 - 48.0 %    MCV 87.1 80.0 - 99.9 fL    MCH 27.5 26.0 - 35.0 pg    MCHC 31.6 (L) 32.0 - 34.5 %    RDW 15.3 (H) 11.5 - 15.0 fL    Platelets 045 132 - 416 E9/L    MPV 11.3 7.0 - 12.0 fL    Neutrophils % 62.2 43.0 - 80.0 %    Immature Granulocytes % 1.0 0.0 - 5.0 %    Lymphocytes % 26.7 20.0 - 42.0 %    Monocytes % 8.2 2.0 - 12.0 %    Eosinophils % 1.3 0.0 - 6.0 %    Basophils % 0.6 0.0 - 2.0 %    Neutrophils Absolute 6.94 1.80 - 7.30 E9/L    Immature Granulocytes # 0.11 E9/L    Lymphocytes Absolute 2.97 1.50 - 4.00 E9/L    Monocytes Absolute 0.91 0.10 - 0.95 E9/L    Eosinophils Absolute 0.14 0.05 - 0.50 E9/L    Basophils Absolute 0.07 0.00 - 0.20 E9/L   Comprehensive Metabolic Panel w/ Reflex to MG   Result Value Ref Range    Sodium 141 132 - 146 mmol/L    Potassium reflex Magnesium 4.1 3.5 - 5.0 mmol/L    Chloride 106 98 - 107

## 2023-04-21 SDOH — ECONOMIC STABILITY: FOOD INSECURITY: WITHIN THE PAST 12 MONTHS, YOU WORRIED THAT YOUR FOOD WOULD RUN OUT BEFORE YOU GOT MONEY TO BUY MORE.: OFTEN TRUE

## 2023-04-21 SDOH — ECONOMIC STABILITY: HOUSING INSECURITY
IN THE LAST 12 MONTHS, WAS THERE A TIME WHEN YOU DID NOT HAVE A STEADY PLACE TO SLEEP OR SLEPT IN A SHELTER (INCLUDING NOW)?: NO

## 2023-04-21 SDOH — ECONOMIC STABILITY: FOOD INSECURITY: WITHIN THE PAST 12 MONTHS, THE FOOD YOU BOUGHT JUST DIDN'T LAST AND YOU DIDN'T HAVE MONEY TO GET MORE.: OFTEN TRUE

## 2023-04-21 SDOH — ECONOMIC STABILITY: INCOME INSECURITY: HOW HARD IS IT FOR YOU TO PAY FOR THE VERY BASICS LIKE FOOD, HOUSING, MEDICAL CARE, AND HEATING?: SOMEWHAT HARD

## 2023-04-24 ENCOUNTER — OFFICE VISIT (OUTPATIENT)
Dept: FAMILY MEDICINE CLINIC | Age: 63
End: 2023-04-24
Payer: MEDICAID

## 2023-04-24 VITALS
DIASTOLIC BLOOD PRESSURE: 80 MMHG | BODY MASS INDEX: 52.41 KG/M2 | OXYGEN SATURATION: 95 % | HEART RATE: 60 BPM | WEIGHT: 277.6 LBS | TEMPERATURE: 97.2 F | HEIGHT: 61 IN | SYSTOLIC BLOOD PRESSURE: 136 MMHG | RESPIRATION RATE: 18 BRPM

## 2023-04-24 DIAGNOSIS — R16.1 SPLENOMEGALY: ICD-10-CM

## 2023-04-24 DIAGNOSIS — I10 ESSENTIAL HYPERTENSION: ICD-10-CM

## 2023-04-24 DIAGNOSIS — N20.0 RECURRENT KIDNEY STONES: ICD-10-CM

## 2023-04-24 DIAGNOSIS — R74.8 ELEVATED LIVER ENZYMES: ICD-10-CM

## 2023-04-24 DIAGNOSIS — K22.89 ESOPHAGEAL THICKENING: ICD-10-CM

## 2023-04-24 DIAGNOSIS — Z79.4 TYPE 2 DIABETES MELLITUS WITHOUT COMPLICATION, WITH LONG-TERM CURRENT USE OF INSULIN (HCC): Primary | ICD-10-CM

## 2023-04-24 DIAGNOSIS — E03.9 HYPOTHYROIDISM, UNSPECIFIED TYPE: ICD-10-CM

## 2023-04-24 DIAGNOSIS — E11.9 TYPE 2 DIABETES MELLITUS WITHOUT COMPLICATION, WITH LONG-TERM CURRENT USE OF INSULIN (HCC): Primary | ICD-10-CM

## 2023-04-24 LAB
CHP ED QC CHECK: NORMAL
GLUCOSE BLD-MCNC: 110 MG/DL
HBA1C MFR BLD: 5.9 %

## 2023-04-24 PROCEDURE — 3078F DIAST BP <80 MM HG: CPT | Performed by: FAMILY MEDICINE

## 2023-04-24 PROCEDURE — 99214 OFFICE O/P EST MOD 30 MIN: CPT | Performed by: FAMILY MEDICINE

## 2023-04-24 PROCEDURE — 3017F COLORECTAL CA SCREEN DOC REV: CPT | Performed by: FAMILY MEDICINE

## 2023-04-24 PROCEDURE — G8417 CALC BMI ABV UP PARAM F/U: HCPCS | Performed by: FAMILY MEDICINE

## 2023-04-24 PROCEDURE — 3044F HG A1C LEVEL LT 7.0%: CPT | Performed by: FAMILY MEDICINE

## 2023-04-24 PROCEDURE — 3074F SYST BP LT 130 MM HG: CPT | Performed by: FAMILY MEDICINE

## 2023-04-24 PROCEDURE — 2022F DILAT RTA XM EVC RTNOPTHY: CPT | Performed by: FAMILY MEDICINE

## 2023-04-24 PROCEDURE — 83036 HEMOGLOBIN GLYCOSYLATED A1C: CPT | Performed by: FAMILY MEDICINE

## 2023-04-24 PROCEDURE — 1036F TOBACCO NON-USER: CPT | Performed by: FAMILY MEDICINE

## 2023-04-24 PROCEDURE — G8427 DOCREV CUR MEDS BY ELIG CLIN: HCPCS | Performed by: FAMILY MEDICINE

## 2023-04-24 PROCEDURE — 82962 GLUCOSE BLOOD TEST: CPT | Performed by: FAMILY MEDICINE

## 2023-04-24 ASSESSMENT — PATIENT HEALTH QUESTIONNAIRE - PHQ9
5. POOR APPETITE OR OVEREATING: 0
7. TROUBLE CONCENTRATING ON THINGS, SUCH AS READING THE NEWSPAPER OR WATCHING TELEVISION: 0
8. MOVING OR SPEAKING SO SLOWLY THAT OTHER PEOPLE COULD HAVE NOTICED. OR THE OPPOSITE, BEING SO FIGETY OR RESTLESS THAT YOU HAVE BEEN MOVING AROUND A LOT MORE THAN USUAL: 0
4. FEELING TIRED OR HAVING LITTLE ENERGY: 0
2. FEELING DOWN, DEPRESSED OR HOPELESS: 0
10. IF YOU CHECKED OFF ANY PROBLEMS, HOW DIFFICULT HAVE THESE PROBLEMS MADE IT FOR YOU TO DO YOUR WORK, TAKE CARE OF THINGS AT HOME, OR GET ALONG WITH OTHER PEOPLE: 0
SUM OF ALL RESPONSES TO PHQ QUESTIONS 1-9: 0
3. TROUBLE FALLING OR STAYING ASLEEP: 0
SUM OF ALL RESPONSES TO PHQ9 QUESTIONS 1 & 2: 0
SUM OF ALL RESPONSES TO PHQ QUESTIONS 1-9: 0
6. FEELING BAD ABOUT YOURSELF - OR THAT YOU ARE A FAILURE OR HAVE LET YOURSELF OR YOUR FAMILY DOWN: 0
SUM OF ALL RESPONSES TO PHQ QUESTIONS 1-9: 0
SUM OF ALL RESPONSES TO PHQ QUESTIONS 1-9: 0
1. LITTLE INTEREST OR PLEASURE IN DOING THINGS: 0
9. THOUGHTS THAT YOU WOULD BE BETTER OFF DEAD, OR OF HURTING YOURSELF: 0

## 2023-04-25 ENCOUNTER — PATIENT MESSAGE (OUTPATIENT)
Dept: FAMILY MEDICINE CLINIC | Age: 63
End: 2023-04-25

## 2023-04-25 DIAGNOSIS — E55.9 VITAMIN D DEFICIENCY: ICD-10-CM

## 2023-04-25 DIAGNOSIS — I10 ESSENTIAL HYPERTENSION: ICD-10-CM

## 2023-04-25 DIAGNOSIS — E78.2 MIXED HYPERLIPIDEMIA: ICD-10-CM

## 2023-04-25 DIAGNOSIS — E03.9 HYPOTHYROIDISM, UNSPECIFIED TYPE: ICD-10-CM

## 2023-04-25 DIAGNOSIS — R53.83 FATIGUE, UNSPECIFIED TYPE: ICD-10-CM

## 2023-04-25 DIAGNOSIS — E11.9 TYPE 2 DIABETES MELLITUS WITHOUT COMPLICATION, WITH LONG-TERM CURRENT USE OF INSULIN (HCC): Primary | ICD-10-CM

## 2023-04-25 DIAGNOSIS — Z79.4 TYPE 2 DIABETES MELLITUS WITHOUT COMPLICATION, WITH LONG-TERM CURRENT USE OF INSULIN (HCC): Primary | ICD-10-CM

## 2023-04-25 NOTE — TELEPHONE ENCOUNTER
From: Ubaldo Mir  To: Dr. Juan A Grimm  Sent: 4/25/2023 1:07 PM EDT  Subject: Blood test    Hi Esperanza  When I go to [facility] for my ultrasound can u order bloodtests to check my cholesterol, vitamin d and thyroid I think it's been a year since they were checked  Thank you

## 2023-04-27 PROBLEM — R16.1 SPLENOMEGALY: Status: ACTIVE | Noted: 2023-04-27

## 2023-04-27 PROBLEM — K22.89 ESOPHAGEAL THICKENING: Status: ACTIVE | Noted: 2023-04-27

## 2023-04-27 ASSESSMENT — ENCOUNTER SYMPTOMS
EYE PAIN: 0
EYES NEGATIVE: 1
EYE REDNESS: 0
EYE DISCHARGE: 0
ALLERGIC/IMMUNOLOGIC NEGATIVE: 1
CONSTIPATION: 0
SINUS PRESSURE: 0
VOICE CHANGE: 0
COUGH: 0
FACIAL SWELLING: 0
COLOR CHANGE: 0
BLOOD IN STOOL: 0
CHOKING: 0
WHEEZING: 0
ANAL BLEEDING: 0
VOMITING: 0
RHINORRHEA: 0
DIARRHEA: 0
NAUSEA: 0
ABDOMINAL PAIN: 1
ABDOMINAL DISTENTION: 0
RECTAL PAIN: 0
SORE THROAT: 0
PHOTOPHOBIA: 0
EYE ITCHING: 0
SHORTNESS OF BREATH: 0
STRIDOR: 0
BACK PAIN: 1
CHEST TIGHTNESS: 0
SINUS PAIN: 0
TROUBLE SWALLOWING: 0

## 2023-04-28 NOTE — PROGRESS NOTES
Yessenia Schulz is a 57 y.o. female. HPI/Chief C/O:  Chief Complaint   Patient presents with    Follow-up     She was in [address] [place] [address] ErR4/7/2023 due to abdominal pain      No Known Allergies    This 57year old female presents for ED follow up on 4/7/2023 for LUQ pain. Pt states the LUQ pain has resolved. CT abdomen shows left nephrolithiasis, left renal stone, splenomegaly, diverticulosis, and esophageal thickening. Pt denies nausea and vomiting, and denies fever. ROS:  Review of Systems   Constitutional:  Positive for fatigue. Negative for activity change, appetite change, chills, diaphoresis, fever and unexpected weight change. HENT: Negative. Negative for congestion, dental problem, drooling, ear discharge, ear pain, facial swelling, hearing loss, mouth sores, nosebleeds, postnasal drip, rhinorrhea, sinus pressure, sinus pain, sneezing, sore throat, tinnitus, trouble swallowing and voice change. Eyes: Negative. Negative for photophobia, pain, discharge, redness, itching and visual disturbance. Respiratory:  Negative for cough, choking, chest tightness, shortness of breath, wheezing and stridor. Cardiovascular: Negative. Negative for chest pain, palpitations and leg swelling. Gastrointestinal:  Positive for abdominal pain. Negative for abdominal distention, anal bleeding, blood in stool, constipation, diarrhea, nausea, rectal pain and vomiting. Endocrine: Negative for cold intolerance, heat intolerance, polydipsia, polyphagia and polyuria. Genitourinary:  Positive for frequency and urgency. Negative for decreased urine volume, difficulty urinating, dysuria, flank pain, genital sores, hematuria, menstrual problem and pelvic pain. Musculoskeletal:  Positive for arthralgias, back pain and myalgias. Negative for gait problem, joint swelling, neck pain and neck stiffness. Skin: Negative. Negative for color change, pallor, rash and wound. Allergic/Immunologic: Negative.

## 2023-05-10 ENCOUNTER — HOSPITAL ENCOUNTER (OUTPATIENT)
Age: 63
Discharge: HOME | End: 2023-05-10
Payer: MEDICAID

## 2023-05-10 ENCOUNTER — HOSPITAL ENCOUNTER (OUTPATIENT)
Dept: ULTRASOUND IMAGING | Age: 63
Discharge: HOME | End: 2023-05-10
Payer: MEDICAID

## 2023-05-10 DIAGNOSIS — Z79.4 TYPE 2 DIABETES MELLITUS WITHOUT COMPLICATION, WITH LONG-TERM CURRENT USE OF INSULIN (HCC): ICD-10-CM

## 2023-05-10 DIAGNOSIS — I10 ESSENTIAL HYPERTENSION: ICD-10-CM

## 2023-05-10 DIAGNOSIS — R53.83 FATIGUE, UNSPECIFIED TYPE: ICD-10-CM

## 2023-05-10 DIAGNOSIS — E11.9 TYPE 2 DIABETES MELLITUS WITHOUT COMPLICATION, WITH LONG-TERM CURRENT USE OF INSULIN (HCC): ICD-10-CM

## 2023-05-10 DIAGNOSIS — R16.1 SPLENOMEGALY: ICD-10-CM

## 2023-05-10 DIAGNOSIS — E78.2 MIXED HYPERLIPIDEMIA: ICD-10-CM

## 2023-05-10 DIAGNOSIS — R74.8 ELEVATED LIVER ENZYMES: ICD-10-CM

## 2023-05-10 DIAGNOSIS — E03.9 HYPOTHYROIDISM, UNSPECIFIED TYPE: ICD-10-CM

## 2023-05-10 DIAGNOSIS — E55.9 VITAMIN D DEFICIENCY: ICD-10-CM

## 2023-05-10 LAB
ALBUMIN SERPL-MCNC: 4.4 G/DL (ref 3.5–5.2)
ALP SERPL-CCNC: 83 U/L (ref 35–104)
ALT SERPL-CCNC: 16 U/L (ref 0–32)
ANION GAP SERPL CALCULATED.3IONS-SCNC: 12 MMOL/L (ref 7–16)
AST SERPL-CCNC: 17 U/L (ref 0–31)
BASOPHILS # BLD: 0.04 E9/L (ref 0–0.2)
BASOPHILS NFR BLD: 0.6 % (ref 0–2)
BILIRUB SERPL-MCNC: 0.6 MG/DL (ref 0–1.2)
BUN SERPL-MCNC: 15 MG/DL (ref 6–23)
CALCIUM SERPL-MCNC: 9.3 MG/DL (ref 8.6–10.2)
CHLORIDE SERPL-SCNC: 103 MMOL/L (ref 98–107)
CHOLESTEROL, TOTAL: 172 MG/DL (ref 0–199)
CO2 SERPL-SCNC: 26 MMOL/L (ref 22–29)
CREAT SERPL-MCNC: 0.9 MG/DL (ref 0.5–1)
EOSINOPHIL # BLD: 0.1 E9/L (ref 0.05–0.5)
EOSINOPHIL NFR BLD: 1.4 % (ref 0–6)
ERYTHROCYTE [DISTWIDTH] IN BLOOD BY AUTOMATED COUNT: 15.4 FL (ref 11.5–15)
GLUCOSE SERPL-MCNC: 120 MG/DL (ref 74–99)
HCT VFR BLD AUTO: 41.5 % (ref 34–48)
HDLC SERPL-MCNC: 40 MG/DL
HGB BLD-MCNC: 13.2 G/DL (ref 11.5–15.5)
IMM GRANULOCYTES # BLD: 0.03 E9/L
IMM GRANULOCYTES NFR BLD: 0.4 % (ref 0–5)
LDLC SERPL CALC-MCNC: 97 MG/DL (ref 0–99)
LYMPHOCYTES # BLD: 2.1 E9/L (ref 1.5–4)
LYMPHOCYTES NFR BLD: 29.9 % (ref 20–42)
MCH RBC QN AUTO: 27.6 PG (ref 26–35)
MCHC RBC AUTO-ENTMCNC: 31.8 % (ref 32–34.5)
MCV RBC AUTO: 86.8 FL (ref 80–99.9)
MONOCYTES # BLD: 0.56 E9/L (ref 0.1–0.95)
MONOCYTES NFR BLD: 8 % (ref 2–12)
NEUTROPHILS # BLD: 4.2 E9/L (ref 1.8–7.3)
NEUTS SEG NFR BLD: 59.7 % (ref 43–80)
PLATELET # BLD AUTO: 197 E9/L (ref 130–450)
PMV BLD AUTO: 11.4 FL (ref 7–12)
POTASSIUM SERPL-SCNC: 3.8 MMOL/L (ref 3.5–5)
PROT SERPL-MCNC: 7.5 G/DL (ref 6.4–8.3)
RBC # BLD AUTO: 4.78 E12/L (ref 3.5–5.5)
SODIUM SERPL-SCNC: 141 MMOL/L (ref 132–146)
TRIGL SERPL-MCNC: 176 MG/DL (ref 0–149)
TSH SERPL-MCNC: 3.39 UIU/ML (ref 0.27–4.2)
VITAMIN D 25-HYDROXY: 43 NG/ML (ref 30–100)
VLDLC SERPL CALC-MCNC: 35 MG/DL
WBC # BLD: 7 E9/L (ref 4.5–11.5)

## 2023-05-10 PROCEDURE — 76705 ECHO EXAM OF ABDOMEN: CPT

## 2023-05-10 PROCEDURE — 80061 LIPID PANEL: CPT

## 2023-05-10 PROCEDURE — 80053 COMPREHEN METABOLIC PANEL: CPT

## 2023-05-10 PROCEDURE — 85025 COMPLETE CBC W/AUTO DIFF WBC: CPT

## 2023-05-10 PROCEDURE — 36415 COLL VENOUS BLD VENIPUNCTURE: CPT

## 2023-05-10 PROCEDURE — 84443 ASSAY THYROID STIM HORMONE: CPT

## 2023-05-10 PROCEDURE — 82306 VITAMIN D 25 HYDROXY: CPT

## 2023-05-12 ENCOUNTER — PATIENT MESSAGE (OUTPATIENT)
Dept: FAMILY MEDICINE CLINIC | Age: 63
End: 2023-05-12

## 2023-05-12 DIAGNOSIS — F32.A DEPRESSION, UNSPECIFIED DEPRESSION TYPE: ICD-10-CM

## 2023-05-12 DIAGNOSIS — I10 ESSENTIAL HYPERTENSION: ICD-10-CM

## 2023-05-12 DIAGNOSIS — R07.1 CHEST PAIN VARYING WITH BREATHING: ICD-10-CM

## 2023-05-12 DIAGNOSIS — R16.0 ENLARGED LIVER: Primary | ICD-10-CM

## 2023-05-12 DIAGNOSIS — E03.9 HYPOTHYROIDISM, UNSPECIFIED TYPE: ICD-10-CM

## 2023-05-15 RX ORDER — LEVOTHYROXINE SODIUM 0.07 MG/1
TABLET ORAL
Qty: 90 TABLET | Refills: 1 | Status: SHIPPED | OUTPATIENT
Start: 2023-05-15

## 2023-05-15 RX ORDER — METOPROLOL SUCCINATE 100 MG/1
TABLET, EXTENDED RELEASE ORAL
Qty: 90 TABLET | Refills: 1 | Status: SHIPPED | OUTPATIENT
Start: 2023-05-15

## 2023-05-15 RX ORDER — ERGOCALCIFEROL 1.25 MG/1
50000 CAPSULE ORAL WEEKLY
Qty: 12 CAPSULE | Refills: 1 | Status: SHIPPED | OUTPATIENT
Start: 2023-05-15

## 2023-05-15 RX ORDER — CITALOPRAM 20 MG/1
TABLET ORAL
Qty: 90 TABLET | Refills: 1 | Status: SHIPPED | OUTPATIENT
Start: 2023-05-15

## 2023-05-15 RX ORDER — LISINOPRIL 40 MG/1
TABLET ORAL
Qty: 90 TABLET | Refills: 1 | Status: SHIPPED | OUTPATIENT
Start: 2023-05-15

## 2023-05-15 RX ORDER — GLUCOSAMINE HCL/CHONDROITIN SU 500-400 MG
CAPSULE ORAL
Qty: 300 STRIP | Refills: 1 | Status: SHIPPED | OUTPATIENT
Start: 2023-05-15

## 2023-05-15 RX ORDER — PRAVASTATIN SODIUM 40 MG
40 TABLET ORAL DAILY
Qty: 90 TABLET | Refills: 1 | Status: SHIPPED | OUTPATIENT
Start: 2023-05-15 | End: 2023-11-11

## 2023-05-15 NOTE — TELEPHONE ENCOUNTER
Last seen 4/24/2023  Next appt Visit date not found    Electronically signed by Sharla Moura LPN on 3/71/51 at 8:08 AM EDT

## 2023-06-05 ENCOUNTER — TELEPHONE (OUTPATIENT)
Dept: FAMILY MEDICINE CLINIC | Age: 63
End: 2023-06-05

## 2023-06-05 DIAGNOSIS — N20.0 NEPHROLITHIASIS: Primary | ICD-10-CM

## 2023-06-05 DIAGNOSIS — N20.0 STONE IN RENAL PELVIS: ICD-10-CM

## 2023-06-13 DIAGNOSIS — R16.2 HEPATOMEGALY WITH SPLENOMEGALY, NOT ELSEWHERE CLASSIFIED: ICD-10-CM

## 2023-06-14 LAB
ANA SER QL: NEGATIVE
FERRITIN SERPL-MCNC: 168 NG/ML
IRON SATN MFR SERPL: 21 % (ref 15–50)
IRON SERPL-MCNC: 69 MCG/DL (ref 37–145)
SMOOTH MUSCLE ANTIBODY: NEGATIVE
TIBC SERPL-MCNC: 326 MCG/DL (ref 250–450)

## 2023-06-16 LAB — A1AT SERPL-MCNC: 148 MG/DL (ref 90–200)

## 2023-06-17 DIAGNOSIS — L30.9 DERMATITIS: ICD-10-CM

## 2023-06-19 NOTE — TELEPHONE ENCOUNTER
Last Appointment:  4/24/2023  Future Appointments   Date Time Provider Nely Lowei   6/30/2023 11:00 AM [facility] CT RM 3 SJWZ CT [facility]   4/4/2024 10:00 AM CESAR SMALL [facility] RM 1 CESAR CASTILLO [facility]   4/4/2024 11:00 AM [address], APRN - CNP [facility]

## 2023-06-30 ENCOUNTER — HOSPITAL ENCOUNTER (OUTPATIENT)
Dept: CT IMAGING | Age: 63
Discharge: HOME | End: 2023-06-30
Attending: FAMILY MEDICINE
Payer: MEDICAID

## 2023-06-30 DIAGNOSIS — R07.1 CHEST PAIN VARYING WITH BREATHING: ICD-10-CM

## 2023-06-30 PROCEDURE — 71250 CT THORAX DX C-: CPT

## 2023-07-21 DIAGNOSIS — J01.90 ACUTE BACTERIAL SINUSITIS: Primary | ICD-10-CM

## 2023-07-21 DIAGNOSIS — B96.89 ACUTE BACTERIAL SINUSITIS: Primary | ICD-10-CM

## 2023-07-21 RX ORDER — BENZONATATE 100 MG/1
100 CAPSULE ORAL 2 TIMES DAILY PRN
Qty: 20 CAPSULE | Refills: 0 | Status: SHIPPED | OUTPATIENT
Start: 2023-07-21 | End: 2023-07-31

## 2023-09-27 DIAGNOSIS — K76.0 FATTY LIVER: Primary | ICD-10-CM

## 2023-10-19 ENCOUNTER — TELEPHONE (OUTPATIENT)
Dept: HEMATOLOGY | Age: 63
End: 2023-10-19

## 2023-10-19 NOTE — TELEPHONE ENCOUNTER
The patient is scheduled for her 6 month US on 11/15/2023 at [facility] 8:30 am  Scan 9:00 am  Npo 8 hours prior    I called the patient this morning and left her a voicemail along with the office number for her to call and get the above info and to also schedule a follow up appt  Electronically signed by Stormy Kehr, MA on 10/19/2023 at 8:45 AM      I sent the patient a [employer] message with the above info along with the office number for her to call and schedule a follow up and to confirm she got my message    Called and LVM for patient to call and schedule a follow up appt for after her [address]  Electronically signed by Stormy Kehr, MA on 11/27/2023 at 1:43 PM    I spoke with the patient this afternoon and scheduled her for her follow up on 12/14/2023 at 12:45 pm [facility]. Directions to the office were given at the time of our call.  The patient confirmed all of the above and all questions were answered at this time  Electronically signed by Stormy Kehr, MA on 11/28/2023 at 1:08 PM

## 2023-11-17 ENCOUNTER — HOSPITAL ENCOUNTER (OUTPATIENT)
Dept: ULTRASOUND IMAGING | Age: 63
Discharge: HOME | End: 2023-11-17
Attending: TRANSPLANT SURGERY
Payer: MEDICAID

## 2023-11-17 DIAGNOSIS — K76.0 FATTY LIVER: ICD-10-CM

## 2023-11-17 PROCEDURE — 76705 ECHO EXAM OF ABDOMEN: CPT

## 2023-11-28 DIAGNOSIS — I10 ESSENTIAL HYPERTENSION: ICD-10-CM

## 2023-11-28 NOTE — TELEPHONE ENCOUNTER
----- Message from August Stanton sent at 11/28/2023 12:08 PM EST -----  Subject: Refill Request    QUESTIONS  Name of Medication? lisinopril (PRINIVIL;ZESTRIL) 40 MG tablet  Patient-reported dosage and instructions? 1 x daily  How many days do you have left? 0  [id number]  Pharmacy phone number (if available)? [phone]  ---------------------------------------------------------------------------  --------------,  Name of Medication? metoprolol succinate (TOPROL XL) 100 MG extended   release tablet  Patient-reported dosage and instructions? 1 x daily  How many days do you have left? 0  Preferred Pharmacy? [address] [place], [place] [address] #[id number]  Pharmacy phone number (if available)? [phone]  Additional Information for Provider? Patient is out of medication  ---------------------------------------------------------------------------  --------------  CALL BACK INFO  What is the best way for the office to contact you? OK to leave message on   voicemail  Preferred Call Back Phone Number? [phone]  ---------------------------------------------------------------------------  --------------  SCRIPT ANSWERS  Relationship to Patient?  Self

## 2023-11-29 ENCOUNTER — OFFICE VISIT (OUTPATIENT)
Dept: FAMILY MEDICINE CLINIC | Age: 63
End: 2023-11-29
Payer: MEDICAID

## 2023-11-29 VITALS
DIASTOLIC BLOOD PRESSURE: 102 MMHG | RESPIRATION RATE: 20 BRPM | HEIGHT: 61 IN | OXYGEN SATURATION: 92 % | WEIGHT: 293 LBS | SYSTOLIC BLOOD PRESSURE: 148 MMHG | HEART RATE: 85 BPM | BODY MASS INDEX: 55.32 KG/M2 | TEMPERATURE: 97.3 F

## 2023-11-29 DIAGNOSIS — Z79.4 TYPE 2 DIABETES MELLITUS WITHOUT COMPLICATION, WITH LONG-TERM CURRENT USE OF INSULIN (HCC): ICD-10-CM

## 2023-11-29 DIAGNOSIS — E11.9 TYPE 2 DIABETES MELLITUS WITHOUT COMPLICATION, WITH LONG-TERM CURRENT USE OF INSULIN (HCC): ICD-10-CM

## 2023-11-29 DIAGNOSIS — F32.A DEPRESSION, UNSPECIFIED DEPRESSION TYPE: ICD-10-CM

## 2023-11-29 DIAGNOSIS — I10 ESSENTIAL HYPERTENSION: ICD-10-CM

## 2023-11-29 DIAGNOSIS — E03.9 HYPOTHYROIDISM, UNSPECIFIED TYPE: ICD-10-CM

## 2023-11-29 DIAGNOSIS — E78.2 MIXED HYPERLIPIDEMIA: Primary | ICD-10-CM

## 2023-11-29 LAB
BILIRUBIN, POC: NORMAL
BLOOD URINE, POC: NORMAL
CHP ED QC CHECK: NORMAL
CLARITY, POC: NORMAL
COLOR, POC: NORMAL
CREATININE URINE POCT: 100
GLUCOSE BLD-MCNC: 134 MG/DL
GLUCOSE URINE, POC: NORMAL
HBA1C MFR BLD: 5.5 %
KETONES, POC: NORMAL
LEUKOCYTE EST, POC: NORMAL
MICROALBUMIN/CREAT 24H UR: 30 MG/G{CREAT}
MICROALBUMIN/CREAT UR-RTO: <30
NITRITE, POC: NORMAL
PH, POC: 5.5
PROTEIN, POC: NORMAL
SPECIFIC GRAVITY, POC: 1.01
UROBILINOGEN, POC: 0.2

## 2023-11-29 PROCEDURE — 3017F COLORECTAL CA SCREEN DOC REV: CPT | Performed by: FAMILY MEDICINE

## 2023-11-29 PROCEDURE — 1036F TOBACCO NON-USER: CPT | Performed by: FAMILY MEDICINE

## 2023-11-29 PROCEDURE — 99214 OFFICE O/P EST MOD 30 MIN: CPT | Performed by: FAMILY MEDICINE

## 2023-11-29 PROCEDURE — 81002 URINALYSIS NONAUTO W/O SCOPE: CPT | Performed by: FAMILY MEDICINE

## 2023-11-29 PROCEDURE — 3078F DIAST BP <80 MM HG: CPT | Performed by: FAMILY MEDICINE

## 2023-11-29 PROCEDURE — G8417 CALC BMI ABV UP PARAM F/U: HCPCS | Performed by: FAMILY MEDICINE

## 2023-11-29 PROCEDURE — 3074F SYST BP LT 130 MM HG: CPT | Performed by: FAMILY MEDICINE

## 2023-11-29 PROCEDURE — 82044 UR ALBUMIN SEMIQUANTITATIVE: CPT | Performed by: FAMILY MEDICINE

## 2023-11-29 PROCEDURE — 83036 HEMOGLOBIN GLYCOSYLATED A1C: CPT | Performed by: FAMILY MEDICINE

## 2023-11-29 PROCEDURE — G8427 DOCREV CUR MEDS BY ELIG CLIN: HCPCS | Performed by: FAMILY MEDICINE

## 2023-11-29 PROCEDURE — G8484 FLU IMMUNIZE NO ADMIN: HCPCS | Performed by: FAMILY MEDICINE

## 2023-11-29 PROCEDURE — 3044F HG A1C LEVEL LT 7.0%: CPT | Performed by: FAMILY MEDICINE

## 2023-11-29 PROCEDURE — 2022F DILAT RTA XM EVC RTNOPTHY: CPT | Performed by: FAMILY MEDICINE

## 2023-11-29 PROCEDURE — 82962 GLUCOSE BLOOD TEST: CPT | Performed by: FAMILY MEDICINE

## 2023-11-29 RX ORDER — LISINOPRIL 40 MG/1
TABLET ORAL
Qty: 90 TABLET | Refills: 1 | Status: SHIPPED
Start: 2023-11-29 | End: 2023-11-29 | Stop reason: SDUPTHER

## 2023-11-29 RX ORDER — PRAVASTATIN SODIUM 40 MG
40 TABLET ORAL DAILY
Qty: 90 TABLET | Refills: 1 | Status: SHIPPED | OUTPATIENT
Start: 2023-11-29 | End: 2024-05-27

## 2023-11-29 RX ORDER — GLUCOSAMINE HCL/CHONDROITIN SU 500-400 MG
CAPSULE ORAL
Qty: 300 STRIP | Refills: 1 | Status: SHIPPED | OUTPATIENT
Start: 2023-11-29

## 2023-11-29 RX ORDER — METOPROLOL SUCCINATE 100 MG/1
TABLET, EXTENDED RELEASE ORAL
Qty: 90 TABLET | Refills: 1 | Status: SHIPPED
Start: 2023-11-29 | End: 2023-11-29 | Stop reason: SDUPTHER

## 2023-11-29 RX ORDER — LISINOPRIL 40 MG/1
TABLET ORAL
Qty: 90 TABLET | Refills: 1 | Status: SHIPPED | OUTPATIENT
Start: 2023-11-29

## 2023-11-29 RX ORDER — METOPROLOL SUCCINATE 100 MG/1
TABLET, EXTENDED RELEASE ORAL
Qty: 90 TABLET | Refills: 1 | Status: SHIPPED | OUTPATIENT
Start: 2023-11-29

## 2023-11-29 RX ORDER — LEVOTHYROXINE SODIUM 0.07 MG/1
TABLET ORAL
Qty: 90 TABLET | Refills: 1 | Status: SHIPPED | OUTPATIENT
Start: 2023-11-29

## 2023-11-29 RX ORDER — INSULIN GLARGINE 100 [IU]/ML
INJECTION, SOLUTION SUBCUTANEOUS
Qty: 18 ML | Refills: 1 | Status: SHIPPED | OUTPATIENT
Start: 2023-11-29

## 2023-11-29 RX ORDER — CITALOPRAM 20 MG/1
TABLET ORAL
Qty: 90 TABLET | Refills: 1 | Status: SHIPPED | OUTPATIENT
Start: 2023-11-29

## 2023-12-02 PROBLEM — F32.A DEPRESSION: Status: ACTIVE | Noted: 2023-12-02

## 2023-12-02 ASSESSMENT — ENCOUNTER SYMPTOMS
SHORTNESS OF BREATH: 0
ABDOMINAL DISTENTION: 0
SORE THROAT: 0
RECTAL PAIN: 0
TROUBLE SWALLOWING: 0
NAUSEA: 0
COUGH: 0
ABDOMINAL PAIN: 0
FACIAL SWELLING: 0
BACK PAIN: 1
CONSTIPATION: 0
ANAL BLEEDING: 0
STRIDOR: 0
CHOKING: 0
SINUS PAIN: 0
COLOR CHANGE: 0
SINUS PRESSURE: 0
RHINORRHEA: 0
CHEST TIGHTNESS: 0
EYE PAIN: 0
BLOOD IN STOOL: 0
EYE DISCHARGE: 0
EYE REDNESS: 0
VOMITING: 0
EYES NEGATIVE: 1
VOICE CHANGE: 0
ALLERGIC/IMMUNOLOGIC NEGATIVE: 1
WHEEZING: 0
EYE ITCHING: 0
DIARRHEA: 0
PHOTOPHOBIA: 0

## 2023-12-12 ENCOUNTER — TELEPHONE (OUTPATIENT)
Dept: HEMATOLOGY | Age: 63
End: 2023-12-12

## 2023-12-12 NOTE — TELEPHONE ENCOUNTER
I called the patient to remind her of her appt on 12/14/2023, the patient stated that she would like to wait until after the new year to follow up due to having some insurance issues. While on the phone I rescheduled the patient for 01/04/2024 at 1:00 pm [facility].  She confirmed the new date and time   Electronically signed by Celedonio Brittle, MA on 12/12/2023 at 3:19 PM

## 2024-01-23 ENCOUNTER — OFFICE VISIT (OUTPATIENT)
Dept: SURGERY | Age: 64
End: 2024-01-23
Payer: MEDICARE

## 2024-01-23 VITALS
OXYGEN SATURATION: 95 % | DIASTOLIC BLOOD PRESSURE: 90 MMHG | BODY MASS INDEX: 57.33 KG/M2 | WEIGHT: 292 LBS | HEART RATE: 63 BPM | HEIGHT: 60 IN | SYSTOLIC BLOOD PRESSURE: 166 MMHG | TEMPERATURE: 97.7 F | RESPIRATION RATE: 16 BRPM

## 2024-01-23 DIAGNOSIS — K76.0 NONALCOHOLIC FATTY LIVER DISEASE: Primary | ICD-10-CM

## 2024-01-23 PROCEDURE — G8484 FLU IMMUNIZE NO ADMIN: HCPCS | Performed by: TRANSPLANT SURGERY

## 2024-01-23 PROCEDURE — 99213 OFFICE O/P EST LOW 20 MIN: CPT | Performed by: TRANSPLANT SURGERY

## 2024-01-23 PROCEDURE — 3017F COLORECTAL CA SCREEN DOC REV: CPT | Performed by: TRANSPLANT SURGERY

## 2024-01-23 PROCEDURE — G8417 CALC BMI ABV UP PARAM F/U: HCPCS | Performed by: TRANSPLANT SURGERY

## 2024-01-23 PROCEDURE — G8427 DOCREV CUR MEDS BY ELIG CLIN: HCPCS | Performed by: TRANSPLANT SURGERY

## 2024-01-23 PROCEDURE — 3077F SYST BP >= 140 MM HG: CPT | Performed by: TRANSPLANT SURGERY

## 2024-01-23 PROCEDURE — 1036F TOBACCO NON-USER: CPT | Performed by: TRANSPLANT SURGERY

## 2024-01-23 PROCEDURE — 1090F PRES/ABSN URINE INCON ASSESS: CPT | Performed by: TRANSPLANT SURGERY

## 2024-01-23 PROCEDURE — 3080F DIAST BP >= 90 MM HG: CPT | Performed by: TRANSPLANT SURGERY

## 2024-01-23 PROCEDURE — 1123F ACP DISCUSS/DSCN MKR DOCD: CPT | Performed by: TRANSPLANT SURGERY

## 2024-01-23 PROCEDURE — G8400 PT W/DXA NO RESULTS DOC: HCPCS | Performed by: TRANSPLANT SURGERY

## 2024-01-23 NOTE — PROGRESS NOTES
Hepatobiliary and Pancreatic Surgery Attending History and Physical    Patient's Name/Date of Birth: Sharla Mir /1960 (63 y.o.)    Date: January 23, 2024     CC:hepatomegaly    HPI:  Patient is a very pleasant 62 year old female with morbid obesity and a BMI of 52.9.  She was having left sided abdominal pain that has since resolved.  She has had DM for the past 2 years and for the last year her hemoglobin A1c has been less than 6. She is on lantus and controlling her sugars.  She underwent a cholecystectomy in 2020. She has a family history of liver disease in her sister but states that it is was from alcoholism.  Her liver function tests have remained normal.  Patient underwent a liver workup and her smooth muscle antibody, KATY, iron and TIBC, ferritin, alpha-1 antitrypsin were all negative.  She recently started trulicity.    Physical Exam:  BP (!) 166/90   Pulse 63   Temp 97.7 °F (36.5 °C) (Oral)   Resp 16   Ht 1.524 m (5')   Wt 132.5 kg (292 lb)   LMP  (LMP Unknown)   SpO2 95%   BMI 57.03 kg/m²     PSYCH: mood and affect normal, alert and oriented x 3: No apparent distress, comfortable  EYES: Sclera white, pupils equal round and reactive to light  ENMT:  Hearing normal, trachea midline, ears externally intact  LYMPH: no obvious lympadenopathy in neck.   RESP: Respiratory effort was normal with no retractions or use of accessory muscles.  CV:  No pedal edema  GI/ Abdomen: Soft, nondistended, nontender, no guarding, no peritoneal signs  MSK: no clubbing/ no cyanosis/ gaitnormal       Assessment/Plan:  Hepatomegaly , NAFLD in the setting of Morbid obesity with BMI 57, controlled diabetes mellitus type 2.  - I reviewed their images prior to our office visit and we also reviewed them together today.  - discussed continued tight control of her blood sugars that his will help her liver  -We discussed that due to her tight control of her diabetes is likely the reason why her transaminases have remained

## 2024-01-25 DIAGNOSIS — Z12.31 VISIT FOR SCREENING MAMMOGRAM: Primary | ICD-10-CM

## 2024-01-25 NOTE — TELEPHONE ENCOUNTER
Last Appointment:  11/29/2023  Future Appointments   Date Time Provider Department Center   4/4/2024 10:00 AM [facility] RM 1 [facility]ST[facility] SE Rad/Car   4/4/2024 11:00 AM Juju Pickering, APRN - CNP [facility]

## 2024-01-26 RX ORDER — AMLODIPINE BESYLATE 5 MG/1
5 TABLET ORAL DAILY
Qty: 30 TABLET | Refills: 0 | Status: SHIPPED | OUTPATIENT
Start: 2024-01-26

## 2024-01-31 RX ORDER — AMLODIPINE BESYLATE 5 MG/1
5 TABLET ORAL DAILY
Qty: 90 TABLET | Refills: 1 | Status: SHIPPED | OUTPATIENT
Start: 2024-01-31 | End: 2024-07-29

## 2024-03-14 ENCOUNTER — PATIENT MESSAGE (OUTPATIENT)
Dept: FAMILY MEDICINE CLINIC | Age: 64
End: 2024-03-14

## 2024-03-18 NOTE — PROGRESS NOTES
points:  06/14/2022 Clinically, she has bilateral mobile breast masses that are unchanged.  She has not noticed any change in her breast self-exam.  We reviewed a BI-RADS Category 3 and I reviewed that I cannot guarantee she does not have cancer nor that she will not develop cancer in the future.  However, her clinical exam and her imaging are stable.  We will plan to see her back in 6 months with bilateral limited breast ultrasounds.  She was reminded of the importance of breast self-examination as well as the importance of calling in the event she should notice any breast changes.  She is agreeable to the plan as outlined above.  All questions were answered to her apparent satisfaction.    03/30/2023 clinical follow up:  Her clinical exam remains stable with multiple, bilateral benign appearing densities bilaterally.  She has no breast related complaints on this visit and no new pertinent family history to report. She is working hard to make healthy lifestyle choices.  We reviewed her breast imaging today for educational (not interpretive) purposes on this visit.    We discussed breast anatomy, breast density as assigned by radiology, the bi-rads result, and reviewed the purpose of any biopsy or surgical clips (did not verify placement) noted on imaging.  It was clearly stated to Sharla that interpretation of imaging and the final result of imaging is at the discretion of the reading radiologist. She was advised to continue monthly BSE and call in the event she would notice any changes.  We will plan to see her in 1 year with a bilateral screening mammogram same day.  All questions were answered to  her apparent satisfaction.    04/04/2024 clinical follow-up: Is without secondary evidence of malignancy.  Her breast exam remains clinically challenging in the setting of her dense breast tissue as well as multiple, mobile breast masses bilaterally.  We reviewed the importance of breast self-exam and calling in the

## 2024-04-03 ENCOUNTER — PATIENT MESSAGE (OUTPATIENT)
Dept: FAMILY MEDICINE CLINIC | Age: 64
End: 2024-04-03

## 2024-04-03 DIAGNOSIS — Z79.4 TYPE 2 DIABETES MELLITUS WITHOUT COMPLICATION, WITH LONG-TERM CURRENT USE OF INSULIN (HCC): ICD-10-CM

## 2024-04-03 DIAGNOSIS — E11.9 TYPE 2 DIABETES MELLITUS WITHOUT COMPLICATION, WITH LONG-TERM CURRENT USE OF INSULIN (HCC): ICD-10-CM

## 2024-04-03 NOTE — TELEPHONE ENCOUNTER
From: Sharla Mir  To: Dr. Esperanza Joel  Sent: 4/3/2024 10:45 AM EDT  Subject: Trulicity     Hi Dr. Mata  I have no prescription left on trulicity and [facility] not have availability on it until May 30th I called [facility] in [place] they can fill it so can someone from your office call them today they only have 1 box left and their holding it for me.  The # is [phone] Thank you

## 2024-04-03 NOTE — TELEPHONE ENCOUNTER
Verbally called into the pharmacy, okay per Dolores as it is a maintenance med. Pt notified.     Electronically signed by JANESSA GARCIA MA on 4/3/24 at 4:06 PM EDT

## 2024-04-25 ENCOUNTER — HOSPITAL ENCOUNTER (OUTPATIENT)
Dept: GENERAL RADIOLOGY | Age: 64
Discharge: HOME | End: 2024-04-27
Attending: SURGERY
Payer: MEDICARE

## 2024-04-25 ENCOUNTER — OFFICE VISIT (OUTPATIENT)
Dept: BREAST CENTER | Age: 64
End: 2024-04-25
Payer: MEDICARE

## 2024-04-25 VITALS
HEART RATE: 60 BPM | TEMPERATURE: 97.2 F | RESPIRATION RATE: 16 BRPM | OXYGEN SATURATION: 97 % | SYSTOLIC BLOOD PRESSURE: 144 MMHG | DIASTOLIC BLOOD PRESSURE: 80 MMHG | HEIGHT: 63 IN | WEIGHT: 289 LBS | BODY MASS INDEX: 51.21 KG/M2

## 2024-04-25 VITALS — BODY MASS INDEX: 54.68 KG/M2 | WEIGHT: 280 LBS

## 2024-04-25 DIAGNOSIS — N60.02 BILATERAL BREAST CYSTS: ICD-10-CM

## 2024-04-25 DIAGNOSIS — R92.30 DENSE BREAST: Primary | ICD-10-CM

## 2024-04-25 DIAGNOSIS — N60.01 BILATERAL BREAST CYSTS: ICD-10-CM

## 2024-04-25 DIAGNOSIS — Z12.31 VISIT FOR SCREENING MAMMOGRAM: ICD-10-CM

## 2024-04-25 PROCEDURE — G8427 DOCREV CUR MEDS BY ELIG CLIN: HCPCS | Performed by: NURSE PRACTITIONER

## 2024-04-25 PROCEDURE — G8400 PT W/DXA NO RESULTS DOC: HCPCS | Performed by: NURSE PRACTITIONER

## 2024-04-25 PROCEDURE — 1090F PRES/ABSN URINE INCON ASSESS: CPT | Performed by: NURSE PRACTITIONER

## 2024-04-25 PROCEDURE — 99213 OFFICE O/P EST LOW 20 MIN: CPT | Performed by: NURSE PRACTITIONER

## 2024-04-25 PROCEDURE — 1123F ACP DISCUSS/DSCN MKR DOCD: CPT | Performed by: NURSE PRACTITIONER

## 2024-04-25 PROCEDURE — 77063 BREAST TOMOSYNTHESIS BI: CPT

## 2024-04-25 PROCEDURE — 3077F SYST BP >= 140 MM HG: CPT | Performed by: NURSE PRACTITIONER

## 2024-04-25 PROCEDURE — 1036F TOBACCO NON-USER: CPT | Performed by: NURSE PRACTITIONER

## 2024-04-25 PROCEDURE — 3079F DIAST BP 80-89 MM HG: CPT | Performed by: NURSE PRACTITIONER

## 2024-04-25 PROCEDURE — G8417 CALC BMI ABV UP PARAM F/U: HCPCS | Performed by: NURSE PRACTITIONER

## 2024-04-25 PROCEDURE — 3017F COLORECTAL CA SCREEN DOC REV: CPT | Performed by: NURSE PRACTITIONER

## 2024-04-25 ASSESSMENT — ENCOUNTER SYMPTOMS
VOMITING: 0
ANAL BLEEDING: 0
NAUSEA: 0
ABDOMINAL PAIN: 0
DIARRHEA: 0
ABDOMINAL DISTENTION: 0
CONSTIPATION: 0
SHORTNESS OF BREATH: 0
CHEST TIGHTNESS: 0

## 2024-05-29 ENCOUNTER — PATIENT MESSAGE (OUTPATIENT)
Dept: FAMILY MEDICINE CLINIC | Age: 64
End: 2024-05-29

## 2024-05-29 DIAGNOSIS — M54.50 LUMBAR SPINE PAIN: Primary | ICD-10-CM

## 2024-05-29 DIAGNOSIS — E11.9 TYPE 2 DIABETES MELLITUS WITHOUT COMPLICATION, WITH LONG-TERM CURRENT USE OF INSULIN (HCC): ICD-10-CM

## 2024-05-29 DIAGNOSIS — I10 ESSENTIAL HYPERTENSION: ICD-10-CM

## 2024-05-29 DIAGNOSIS — F32.A DEPRESSION, UNSPECIFIED DEPRESSION TYPE: ICD-10-CM

## 2024-05-29 DIAGNOSIS — Z79.4 TYPE 2 DIABETES MELLITUS WITHOUT COMPLICATION, WITH LONG-TERM CURRENT USE OF INSULIN (HCC): ICD-10-CM

## 2024-05-29 DIAGNOSIS — E03.9 HYPOTHYROIDISM, UNSPECIFIED TYPE: ICD-10-CM

## 2024-05-29 DIAGNOSIS — E78.2 MIXED HYPERLIPIDEMIA: ICD-10-CM

## 2024-05-29 RX ORDER — METOPROLOL SUCCINATE 100 MG/1
TABLET, EXTENDED RELEASE ORAL
Qty: 90 TABLET | Refills: 1 | Status: SHIPPED | OUTPATIENT
Start: 2024-05-29

## 2024-05-29 RX ORDER — AMLODIPINE BESYLATE 5 MG/1
5 TABLET ORAL DAILY
Qty: 90 TABLET | Refills: 1 | Status: SHIPPED | OUTPATIENT
Start: 2024-05-29 | End: 2024-11-25

## 2024-05-29 RX ORDER — LEVOTHYROXINE SODIUM 0.07 MG/1
TABLET ORAL
Qty: 90 TABLET | Refills: 1 | Status: SHIPPED | OUTPATIENT
Start: 2024-05-29

## 2024-05-29 RX ORDER — CITALOPRAM 20 MG/1
TABLET ORAL
Qty: 90 TABLET | Refills: 1 | Status: SHIPPED | OUTPATIENT
Start: 2024-05-29

## 2024-05-29 RX ORDER — PRAVASTATIN SODIUM 40 MG
40 TABLET ORAL DAILY
Qty: 90 TABLET | Refills: 1 | Status: SHIPPED | OUTPATIENT
Start: 2024-05-29 | End: 2024-11-25

## 2024-05-29 RX ORDER — GLUCOSAMINE HCL/CHONDROITIN SU 500-400 MG
CAPSULE ORAL
Qty: 300 STRIP | Refills: 1 | Status: SHIPPED | OUTPATIENT
Start: 2024-05-29

## 2024-05-29 RX ORDER — LISINOPRIL 40 MG/1
TABLET ORAL
Qty: 90 TABLET | Refills: 1 | Status: SHIPPED | OUTPATIENT
Start: 2024-05-29

## 2024-05-29 NOTE — TELEPHONE ENCOUNTER
Last seen 11/29/2023  Next appt 5/29/2024    Electronically signed by JANESSA GARCIA MA on 5/29/24 at 1:03 PM EDT

## 2024-05-29 NOTE — TELEPHONE ENCOUNTER
Please resign, med printed instead of going to pharmacy.     Electronically signed by JANESSA GARCIA MA on 5/29/24 at 2:54 PM EDT

## 2024-05-29 NOTE — TELEPHONE ENCOUNTER
From: Sharla Mir  To: Dr. Esperanza Joel  Sent: 5/29/2024 1:53 PM EDT  Subject: Back Referral     Dr. Mata  I need a referral for a back pain Dr. I did a surgical procedure in July of 22, I need again. The DrGustavo was Rony Figueroa. The DrGustavo I would like to see does the mild procedure that I'm interested in the number is [phone] [facility]. My appt. with you is not until June, I didn't want to wait that long to get it scheduled.  Thank you!

## 2024-05-29 NOTE — TELEPHONE ENCOUNTER
Last seen 11/29/2023  Next appt 6/21/2024    Electronically signed by JANESSA GARCIA MA on 5/29/24 at 1:04 PM EDT

## 2024-06-10 ENCOUNTER — PATIENT MESSAGE (OUTPATIENT)
Dept: FAMILY MEDICINE CLINIC | Age: 64
End: 2024-06-10

## 2024-06-10 DIAGNOSIS — Z79.4 TYPE 2 DIABETES MELLITUS WITHOUT COMPLICATION, WITH LONG-TERM CURRENT USE OF INSULIN (HCC): ICD-10-CM

## 2024-06-10 DIAGNOSIS — E11.9 TYPE 2 DIABETES MELLITUS WITHOUT COMPLICATION, WITH LONG-TERM CURRENT USE OF INSULIN (HCC): ICD-10-CM

## 2024-06-10 DIAGNOSIS — M54.50 LUMBAR SPINE PAIN: Primary | ICD-10-CM

## 2024-06-18 SDOH — ECONOMIC STABILITY: INCOME INSECURITY: HOW HARD IS IT FOR YOU TO PAY FOR THE VERY BASICS LIKE FOOD, HOUSING, MEDICAL CARE, AND HEATING?: NOT HARD AT ALL

## 2024-06-18 SDOH — ECONOMIC STABILITY: FOOD INSECURITY: WITHIN THE PAST 12 MONTHS, YOU WORRIED THAT YOUR FOOD WOULD RUN OUT BEFORE YOU GOT MONEY TO BUY MORE.: NEVER TRUE

## 2024-06-18 SDOH — ECONOMIC STABILITY: FOOD INSECURITY: WITHIN THE PAST 12 MONTHS, THE FOOD YOU BOUGHT JUST DIDN'T LAST AND YOU DIDN'T HAVE MONEY TO GET MORE.: NEVER TRUE

## 2024-06-18 SDOH — HEALTH STABILITY: PHYSICAL HEALTH: ON AVERAGE, HOW MANY MINUTES DO YOU ENGAGE IN EXERCISE AT THIS LEVEL?: 0 MIN

## 2024-06-18 SDOH — HEALTH STABILITY: PHYSICAL HEALTH: ON AVERAGE, HOW MANY DAYS PER WEEK DO YOU ENGAGE IN MODERATE TO STRENUOUS EXERCISE (LIKE A BRISK WALK)?: 0 DAYS

## 2024-06-18 ASSESSMENT — LIFESTYLE VARIABLES
HOW MANY STANDARD DRINKS CONTAINING ALCOHOL DO YOU HAVE ON A TYPICAL DAY: 1
HOW MANY STANDARD DRINKS CONTAINING ALCOHOL DO YOU HAVE ON A TYPICAL DAY: 1 OR 2
HOW OFTEN DO YOU HAVE SIX OR MORE DRINKS ON ONE OCCASION: 1
HOW OFTEN DO YOU HAVE A DRINK CONTAINING ALCOHOL: MONTHLY OR LESS
HOW OFTEN DO YOU HAVE A DRINK CONTAINING ALCOHOL: 2

## 2024-06-18 ASSESSMENT — PATIENT HEALTH QUESTIONNAIRE - PHQ9
SUM OF ALL RESPONSES TO PHQ QUESTIONS 1-9: 0
1. LITTLE INTEREST OR PLEASURE IN DOING THINGS: NOT AT ALL
2. FEELING DOWN, DEPRESSED OR HOPELESS: NOT AT ALL
SUM OF ALL RESPONSES TO PHQ9 QUESTIONS 1 & 2: 0
SUM OF ALL RESPONSES TO PHQ QUESTIONS 1-9: 0

## 2024-06-21 ENCOUNTER — OFFICE VISIT (OUTPATIENT)
Dept: FAMILY MEDICINE CLINIC | Age: 64
End: 2024-06-21
Payer: MEDICARE

## 2024-06-21 ENCOUNTER — PATIENT MESSAGE (OUTPATIENT)
Dept: FAMILY MEDICINE CLINIC | Age: 64
End: 2024-06-21

## 2024-06-21 VITALS
RESPIRATION RATE: 20 BRPM | TEMPERATURE: 97.3 F | DIASTOLIC BLOOD PRESSURE: 84 MMHG | OXYGEN SATURATION: 95 % | HEIGHT: 63 IN | WEIGHT: 292.4 LBS | BODY MASS INDEX: 51.81 KG/M2 | SYSTOLIC BLOOD PRESSURE: 134 MMHG | HEART RATE: 77 BPM

## 2024-06-21 DIAGNOSIS — E78.2 MIXED HYPERLIPIDEMIA: ICD-10-CM

## 2024-06-21 DIAGNOSIS — Z00.00 MEDICARE WELCOME EXAM: Primary | ICD-10-CM

## 2024-06-21 DIAGNOSIS — E11.9 TYPE 2 DIABETES MELLITUS WITHOUT COMPLICATION, WITH LONG-TERM CURRENT USE OF INSULIN (HCC): ICD-10-CM

## 2024-06-21 DIAGNOSIS — I10 ESSENTIAL HYPERTENSION: ICD-10-CM

## 2024-06-21 DIAGNOSIS — N18.32 STAGE 3B CHRONIC KIDNEY DISEASE (HCC): ICD-10-CM

## 2024-06-21 DIAGNOSIS — G47.33 OSA (OBSTRUCTIVE SLEEP APNEA): ICD-10-CM

## 2024-06-21 DIAGNOSIS — E03.9 ACQUIRED HYPOTHYROIDISM: ICD-10-CM

## 2024-06-21 DIAGNOSIS — Z79.4 TYPE 2 DIABETES MELLITUS WITHOUT COMPLICATION, WITH LONG-TERM CURRENT USE OF INSULIN (HCC): ICD-10-CM

## 2024-06-21 DIAGNOSIS — R53.83 FATIGUE, UNSPECIFIED TYPE: ICD-10-CM

## 2024-06-21 DIAGNOSIS — R73.01 IFG (IMPAIRED FASTING GLUCOSE): ICD-10-CM

## 2024-06-21 DIAGNOSIS — Z00.00 WELCOME TO MEDICARE PREVENTIVE VISIT: ICD-10-CM

## 2024-06-21 DIAGNOSIS — G25.81 RLS (RESTLESS LEGS SYNDROME): ICD-10-CM

## 2024-06-21 DIAGNOSIS — R73.09 ELEVATED HEMOGLOBIN A1C: ICD-10-CM

## 2024-06-21 DIAGNOSIS — E11.65 TYPE 2 DIABETES MELLITUS WITH HYPERGLYCEMIA, WITHOUT LONG-TERM CURRENT USE OF INSULIN (HCC): ICD-10-CM

## 2024-06-21 LAB
ALBUMIN: 4.4 G/DL (ref 3.5–5.2)
ALP BLD-CCNC: 105 U/L (ref 35–104)
ALT SERPL-CCNC: 21 U/L (ref 0–32)
ANION GAP SERPL CALCULATED.3IONS-SCNC: 10 MMOL/L (ref 7–16)
AST SERPL-CCNC: 22 U/L (ref 0–31)
BASOPHILS ABSOLUTE: 0.05 K/UL (ref 0–0.2)
BASOPHILS RELATIVE PERCENT: 1 % (ref 0–2)
BILIRUB SERPL-MCNC: 0.4 MG/DL (ref 0–1.2)
BUN BLDV-MCNC: 18 MG/DL (ref 6–23)
CALCIUM SERPL-MCNC: 9.5 MG/DL (ref 8.6–10.2)
CHLORIDE BLD-SCNC: 103 MMOL/L (ref 98–107)
CHOLESTEROL, TOTAL: 153 MG/DL
CHP ED QC CHECK: NORMAL
CO2: 27 MMOL/L (ref 22–29)
CREAT SERPL-MCNC: 0.8 MG/DL (ref 0.5–1)
EOSINOPHILS ABSOLUTE: 0.18 K/UL (ref 0.05–0.5)
EOSINOPHILS RELATIVE PERCENT: 2 % (ref 0–6)
GFR, ESTIMATED: 83 ML/MIN/1.73M2
GLUCOSE BLD-MCNC: 119 MG/DL (ref 74–99)
GLUCOSE BLD-MCNC: 144 MG/DL
HBA1C MFR BLD: 5.4 %
HCT VFR BLD CALC: 42.8 % (ref 34–48)
HDLC SERPL-MCNC: 39 MG/DL
HEMOGLOBIN: 13.2 G/DL (ref 11.5–15.5)
IMMATURE GRANULOCYTES %: 1 % (ref 0–5)
IMMATURE GRANULOCYTES ABSOLUTE: 0.06 K/UL (ref 0–0.58)
LDL CHOLESTEROL: 82 MG/DL
LYMPHOCYTES ABSOLUTE: 1.84 K/UL (ref 1.5–4)
LYMPHOCYTES RELATIVE PERCENT: 22 % (ref 20–42)
MCH RBC QN AUTO: 28.3 PG (ref 26–35)
MCHC RBC AUTO-ENTMCNC: 30.8 G/DL (ref 32–34.5)
MCV RBC AUTO: 91.8 FL (ref 80–99.9)
MONOCYTES ABSOLUTE: 0.64 K/UL (ref 0.1–0.95)
MONOCYTES RELATIVE PERCENT: 8 % (ref 2–12)
NEUTROPHILS ABSOLUTE: 5.54 K/UL (ref 1.8–7.3)
NEUTROPHILS RELATIVE PERCENT: 67 % (ref 43–80)
PDW BLD-RTO: 15.5 % (ref 11.5–15)
PLATELET # BLD: 203 K/UL (ref 130–450)
PMV BLD AUTO: 11.7 FL (ref 7–12)
POTASSIUM SERPL-SCNC: 4.6 MMOL/L (ref 3.5–5)
RBC # BLD: 4.66 M/UL (ref 3.5–5.5)
SODIUM BLD-SCNC: 140 MMOL/L (ref 132–146)
TOTAL PROTEIN: 7.5 G/DL (ref 6.4–8.3)
TRIGL SERPL-MCNC: 161 MG/DL
VLDLC SERPL CALC-MCNC: 32 MG/DL
WBC # BLD: 8.3 K/UL (ref 4.5–11.5)

## 2024-06-21 PROCEDURE — 3075F SYST BP GE 130 - 139MM HG: CPT | Performed by: FAMILY MEDICINE

## 2024-06-21 PROCEDURE — 1123F ACP DISCUSS/DSCN MKR DOCD: CPT | Performed by: FAMILY MEDICINE

## 2024-06-21 PROCEDURE — 3044F HG A1C LEVEL LT 7.0%: CPT | Performed by: FAMILY MEDICINE

## 2024-06-21 PROCEDURE — 82962 GLUCOSE BLOOD TEST: CPT | Performed by: FAMILY MEDICINE

## 2024-06-21 PROCEDURE — 83036 HEMOGLOBIN GLYCOSYLATED A1C: CPT | Performed by: FAMILY MEDICINE

## 2024-06-21 PROCEDURE — 3079F DIAST BP 80-89 MM HG: CPT | Performed by: FAMILY MEDICINE

## 2024-06-21 PROCEDURE — 3017F COLORECTAL CA SCREEN DOC REV: CPT | Performed by: FAMILY MEDICINE

## 2024-06-21 PROCEDURE — 36415 COLL VENOUS BLD VENIPUNCTURE: CPT | Performed by: FAMILY MEDICINE

## 2024-06-21 PROCEDURE — G0402 INITIAL PREVENTIVE EXAM: HCPCS | Performed by: FAMILY MEDICINE

## 2024-06-21 RX ORDER — DULAGLUTIDE 1.5 MG/.5ML
1.5 INJECTION, SOLUTION SUBCUTANEOUS WEEKLY
Qty: 4 ADJUSTABLE DOSE PRE-FILLED PEN SYRINGE | Refills: 2 | Status: SHIPPED
Start: 2024-06-21 | End: 2024-06-21 | Stop reason: SDUPTHER

## 2024-06-21 RX ORDER — PRAMIPEXOLE DIHYDROCHLORIDE 0.12 MG/1
0.12 TABLET ORAL NIGHTLY
Qty: 90 TABLET | Refills: 3 | Status: SHIPPED | OUTPATIENT
Start: 2024-06-21

## 2024-06-21 RX ORDER — DULAGLUTIDE 1.5 MG/.5ML
1.5 INJECTION, SOLUTION SUBCUTANEOUS WEEKLY
Qty: 4 ADJUSTABLE DOSE PRE-FILLED PEN SYRINGE | Refills: 2 | Status: SHIPPED | OUTPATIENT
Start: 2024-06-21

## 2024-06-21 RX ORDER — PANTOPRAZOLE SODIUM 40 MG/1
40 TABLET, DELAYED RELEASE ORAL EVERY MORNING
COMMUNITY
Start: 2024-05-31

## 2024-06-21 ASSESSMENT — PATIENT HEALTH QUESTIONNAIRE - PHQ9
SUM OF ALL RESPONSES TO PHQ QUESTIONS 1-9: 0
3. TROUBLE FALLING OR STAYING ASLEEP: NOT AT ALL
SUM OF ALL RESPONSES TO PHQ QUESTIONS 1-9: 0
SUM OF ALL RESPONSES TO PHQ9 QUESTIONS 1 & 2: 0
2. FEELING DOWN, DEPRESSED OR HOPELESS: NOT AT ALL
5. POOR APPETITE OR OVEREATING: NOT AT ALL
4. FEELING TIRED OR HAVING LITTLE ENERGY: NOT AT ALL
SUM OF ALL RESPONSES TO PHQ QUESTIONS 1-9: 0
SUM OF ALL RESPONSES TO PHQ QUESTIONS 1-9: 0
1. LITTLE INTEREST OR PLEASURE IN DOING THINGS: NOT AT ALL
6. FEELING BAD ABOUT YOURSELF - OR THAT YOU ARE A FAILURE OR HAVE LET YOURSELF OR YOUR FAMILY DOWN: NOT AT ALL
9. THOUGHTS THAT YOU WOULD BE BETTER OFF DEAD, OR OF HURTING YOURSELF: NOT AT ALL
8. MOVING OR SPEAKING SO SLOWLY THAT OTHER PEOPLE COULD HAVE NOTICED. OR THE OPPOSITE, BEING SO FIGETY OR RESTLESS THAT YOU HAVE BEEN MOVING AROUND A LOT MORE THAN USUAL: NOT AT ALL
7. TROUBLE CONCENTRATING ON THINGS, SUCH AS READING THE NEWSPAPER OR WATCHING TELEVISION: NOT AT ALL
10. IF YOU CHECKED OFF ANY PROBLEMS, HOW DIFFICULT HAVE THESE PROBLEMS MADE IT FOR YOU TO DO YOUR WORK, TAKE CARE OF THINGS AT HOME, OR GET ALONG WITH OTHER PEOPLE: NOT DIFFICULT AT ALL

## 2024-06-21 NOTE — PROGRESS NOTES
Venipuncture was obtained from right arm, with 2 attempts. Patient tolerated the procedure without complications or complaints.  Electronically signed by VIDHYA KIM LPN on 6/21/24 at 1:32 PM EDT   Last Appointment:  11/29/2023  Future Appointments   Date Time Provider Department Center   6/25/2024 11:00 AM Rony Figueroa MD [facility]   9/27/2024 11:00 AM SEYZ STACI US RM 3 SEYZ ABDU BC SE Rad/Car   12/20/2024 12:00 PM Esperanza Joel, DO [facility] PC [facility]   4/29/2025 10:00 AM SE[facility] ABDTAYL[facility] RM 1 SEYZ ABDU BC SE Rad/Car   4/29/2025 11:00 AM Juju Pickering, APRN - CNP [facility]

## 2024-06-24 PROBLEM — R73.01 IFG (IMPAIRED FASTING GLUCOSE): Status: ACTIVE | Noted: 2024-06-24

## 2024-06-24 PROBLEM — Z00.00 MEDICARE WELCOME EXAM: Status: ACTIVE | Noted: 2024-06-24

## 2024-06-24 PROBLEM — G25.81 RLS (RESTLESS LEGS SYNDROME): Status: ACTIVE | Noted: 2024-06-24

## 2024-06-24 NOTE — PATIENT INSTRUCTIONS
Learning About Being Active as an Older Adult  Why is being active important as you get older?     Being active is one of the best things you can do for your health. And it's never too late to start. Being active--or getting active, if you aren't already--has definite benefits. It can:  Give you more energy,  Keep your mind sharp.  Improve balance to reduce your risk of falls.  Help you manage chronic illness with fewer medicines.  No matter how old you are, how fit you are, or what health problems you have, there is a form of activity that will work for you. And the more physical activity you can do, the better your overall health will be.  What kinds of activity can help you stay healthy?  Being more active will make your daily activities easier. Physical activity includes planned exercise and things you do in daily life. There are four types of activity:  Aerobic.  Doing aerobic activity makes your heart and lungs strong.  Includes walking, dancing, and gardening.  Aim for at least 2½ hours spread throughout the week.  It improves your energy and can help you sleep better.  Muscle-strengthening.  This type of activity can help maintain muscle and strengthen bones.  Includes climbing stairs, using resistance bands, and lifting or carrying heavy loads.  Aim for at least twice a week.  It can help protect the knees and other joints.  Stretching.  Stretching gives you better range of motion in joints and muscles.  Includes upper arm stretches, calf stretches, and gentle yoga.  Aim for at least twice a week, preferably after your muscles are warmed up from other activities.  It can help you function better in daily life.  Balancing.  This helps you stay coordinated and have good posture.  Includes heel-to-toe walking, michelle chi, and certain types of yoga.  Aim for at least 3 days a week.  It can reduce your risk of falling.  Even if you have a hard time meeting the recommendations, it's better to be more active

## 2024-06-24 NOTE — TELEPHONE ENCOUNTER
From: Sharla Mir  To: Dr. Esperanza Joel  Sent: 6/21/2024 4:12 PM EDT  Subject: Prescription     I just called [facility] in [place] still don't have my script for higher dose of trulicity. Thank you

## 2024-06-24 NOTE — PROGRESS NOTES
Medicare Annual Wellness Visit    Sharla Mir is here for Medicare AWV (Pt here for an AWV.), Medication Refill (Pt wants to discuss the Trulicity, doesn't feel like it is working for her and would like to discuss trying something else. If Dr Mata wants her to stay on it, will need a refill.), and Diabetes ('s-130's. Today was 192.)    Assessment & Plan   Medicare welcome exam  -     CBC with Auto Differential; Future  -     Comprehensive Metabolic Panel; Future  Type 2 diabetes mellitus without complication, with long-term current use of insulin (HCC)  -     POCT glycosylated hemoglobin (Hb A1C)  -     POCT Glucose  -     CBC with Auto Differential; Future  -     Comprehensive Metabolic Panel; Future  Essential hypertension  -     CBC with Auto Differential; Future  -     Comprehensive Metabolic Panel; Future  JOSE DANIEL (obstructive sleep apnea)  -     CBC with Auto Differential; Future  -     Comprehensive Metabolic Panel; Future  Acquired hypothyroidism  -     CBC with Auto Differential; Future  -     Comprehensive Metabolic Panel; Future  Type 2 diabetes mellitus with hyperglycemia, without long-term current use of insulin (HCC)  -     CBC with Auto Differential; Future  -     Comprehensive Metabolic Panel; Future  RLS (restless legs syndrome)  -     pramipexole (MIRAPEX) 0.125 MG tablet; Take 1 tablet by mouth nightly, Disp-90 tablet, R-3Normal  -     CBC with Auto Differential; Future  -     Comprehensive Metabolic Panel; Future  Mixed hyperlipidemia  -     CBC with Auto Differential; Future  -     Comprehensive Metabolic Panel; Future  -     Lipid Panel; Future  IFG (impaired fasting glucose)  -     CBC with Auto Differential; Future  -     Comprehensive Metabolic Panel; Future  Fatigue, unspecified type  -     CBC with Auto Differential; Future  -     Comprehensive Metabolic Panel; Future  -     TSH; Future  Stage 3b chronic kidney disease (HCC)  Elevated hemoglobin A1c  NO NSAIDS.     Pt instructed if

## 2024-06-26 ENCOUNTER — PATIENT MESSAGE (OUTPATIENT)
Dept: FAMILY MEDICINE CLINIC | Age: 64
End: 2024-06-26

## 2024-07-02 ENCOUNTER — PATIENT MESSAGE (OUTPATIENT)
Dept: FAMILY MEDICINE CLINIC | Age: 64
End: 2024-07-02

## 2024-07-24 PROBLEM — Z00.00 MEDICARE WELCOME EXAM: Status: RESOLVED | Noted: 2024-06-24 | Resolved: 2024-07-24

## 2024-08-06 ENCOUNTER — PATIENT MESSAGE (OUTPATIENT)
Dept: FAMILY MEDICINE CLINIC | Age: 64
End: 2024-08-06

## 2024-08-06 DIAGNOSIS — G25.81 RLS (RESTLESS LEGS SYNDROME): ICD-10-CM

## 2024-08-07 RX ORDER — PRAMIPEXOLE DIHYDROCHLORIDE 0.12 MG/1
0.12 TABLET ORAL NIGHTLY
Qty: 90 TABLET | Refills: 3 | Status: SHIPPED | OUTPATIENT
Start: 2024-08-07

## 2024-08-07 NOTE — TELEPHONE ENCOUNTER
Last Appointment:  6/21/2024  Future Appointments   Date Time Provider Department Center   9/27/2024 11:00 AM [facility] BULL RM 3 YZ STACI BC SE Rad/Car   12/20/2024 12:00 PM Esperanza Joel DO [facility]   4/29/2025 10:00 AM [facility] SMALL BRITTANY RM 1 CESAR SMALL BC SE Rad/Car   4/29/2025 11:00 AM Juju Pickering, YEFRI - CNP [facility]

## 2024-09-03 ENCOUNTER — PATIENT MESSAGE (OUTPATIENT)
Dept: FAMILY MEDICINE CLINIC | Age: 64
End: 2024-09-03

## 2024-09-03 RX ORDER — CEPHALEXIN 500 MG/1
500 CAPSULE ORAL 2 TIMES DAILY
Qty: 14 CAPSULE | Refills: 0 | Status: SHIPPED | OUTPATIENT
Start: 2024-09-03 | End: 2024-09-10

## 2024-09-17 RX ORDER — CEFDINIR 300 MG/1
300 CAPSULE ORAL 2 TIMES DAILY
Qty: 14 CAPSULE | Refills: 0 | Status: SHIPPED | OUTPATIENT
Start: 2024-09-17 | End: 2024-09-24

## 2024-09-18 ENCOUNTER — HOSPITAL ENCOUNTER (EMERGENCY)
Age: 64
Discharge: HOME | End: 2024-09-18
Attending: EMERGENCY MEDICINE
Payer: MEDICARE

## 2024-09-18 ENCOUNTER — APPOINTMENT (OUTPATIENT)
Dept: GENERAL RADIOLOGY | Age: 64
End: 2024-09-18
Payer: MEDICARE

## 2024-09-18 VITALS
BODY MASS INDEX: 51.1 KG/M2 | WEIGHT: 288.4 LBS | DIASTOLIC BLOOD PRESSURE: 85 MMHG | SYSTOLIC BLOOD PRESSURE: 163 MMHG | TEMPERATURE: 97.3 F | HEART RATE: 80 BPM | OXYGEN SATURATION: 99 %

## 2024-09-18 DIAGNOSIS — R00.2 PALPITATIONS: Primary | ICD-10-CM

## 2024-09-18 DIAGNOSIS — N30.01 ACUTE CYSTITIS WITH HEMATURIA: ICD-10-CM

## 2024-09-18 LAB
ALBUMIN SERPL-MCNC: 4.3 G/DL (ref 3.5–5.2)
ALP SERPL-CCNC: 102 U/L (ref 35–104)
ALT SERPL-CCNC: 26 U/L (ref 0–32)
ANION GAP SERPL CALCULATED.3IONS-SCNC: 12 MMOL/L (ref 7–16)
AST SERPL-CCNC: 47 U/L (ref 0–31)
BACTERIA URNS QL MICRO: ABNORMAL
BASOPHILS # BLD: 0.05 K/UL (ref 0–0.2)
BASOPHILS NFR BLD: 1 % (ref 0–2)
BILIRUB SERPL-MCNC: 0.5 MG/DL (ref 0–1.2)
BILIRUB UR QL STRIP: NEGATIVE
BUN SERPL-MCNC: 12 MG/DL (ref 6–23)
CALCIUM SERPL-MCNC: 9.4 MG/DL (ref 8.6–10.2)
CHLORIDE SERPL-SCNC: 101 MMOL/L (ref 98–107)
CLARITY UR: ABNORMAL
CO2 SERPL-SCNC: 25 MMOL/L (ref 22–29)
COLOR UR: YELLOW
CREAT SERPL-MCNC: 0.9 MG/DL (ref 0.5–1)
EOSINOPHIL # BLD: 0.15 K/UL (ref 0.05–0.5)
EOSINOPHILS RELATIVE PERCENT: 1 % (ref 0–6)
EPI CELLS #/AREA URNS HPF: ABNORMAL /HPF
ERYTHROCYTE [DISTWIDTH] IN BLOOD BY AUTOMATED COUNT: 15.3 % (ref 11.5–15)
GFR, ESTIMATED: 73 ML/MIN/1.73M2
GLUCOSE SERPL-MCNC: 99 MG/DL (ref 74–99)
GLUCOSE UR STRIP-MCNC: NEGATIVE MG/DL
HCT VFR BLD AUTO: 42.5 % (ref 34–48)
HGB BLD-MCNC: 13.8 G/DL (ref 11.5–15.5)
HGB UR QL STRIP.AUTO: ABNORMAL
IMM GRANULOCYTES # BLD AUTO: 0.05 K/UL (ref 0–0.58)
IMM GRANULOCYTES NFR BLD: 1 % (ref 0–5)
KETONES UR STRIP-MCNC: NEGATIVE MG/DL
LEUKOCYTE ESTERASE UR QL STRIP: ABNORMAL
LYMPHOCYTES NFR BLD: 2.5 K/UL (ref 1.5–4)
LYMPHOCYTES RELATIVE PERCENT: 24 % (ref 20–42)
MCH RBC QN AUTO: 27.7 PG (ref 26–35)
MCHC RBC AUTO-ENTMCNC: 32.5 G/DL (ref 32–34.5)
MCV RBC AUTO: 85.2 FL (ref 80–99.9)
MONOCYTES NFR BLD: 0.72 K/UL (ref 0.1–0.95)
MONOCYTES NFR BLD: 7 % (ref 2–12)
NEUTROPHILS NFR BLD: 67 % (ref 43–80)
NEUTS SEG NFR BLD: 7.01 K/UL (ref 1.8–7.3)
NITRITE UR QL STRIP: POSITIVE
PH UR STRIP: 6 [PH] (ref 5–9)
PLATELET # BLD AUTO: 233 K/UL (ref 130–450)
PMV BLD AUTO: 10.7 FL (ref 7–12)
POTASSIUM SERPL-SCNC: 5.3 MMOL/L (ref 3.5–5)
PROT SERPL-MCNC: 8 G/DL (ref 6.4–8.3)
PROT UR STRIP-MCNC: ABNORMAL MG/DL
RBC # BLD AUTO: 4.99 M/UL (ref 3.5–5.5)
RBC #/AREA URNS HPF: ABNORMAL /HPF
SODIUM SERPL-SCNC: 138 MMOL/L (ref 132–146)
SP GR UR STRIP: >1.03 (ref 1–1.03)
T4 SERPL-MCNC: 10.2 UG/DL (ref 4.5–11.7)
TROPONIN I SERPL HS-MCNC: <6 NG/L (ref 0–9)
TROPONIN I SERPL HS-MCNC: <6 NG/L (ref 0–9)
TSH SERPL DL<=0.05 MIU/L-ACNC: 1.9 UIU/ML (ref 0.27–4.2)
UROBILINOGEN UR STRIP-ACNC: 0.2 EU/DL (ref 0–1)
WBC #/AREA URNS HPF: ABNORMAL /HPF
WBC OTHER # BLD: 10.5 K/UL (ref 4.5–11.5)

## 2024-09-18 PROCEDURE — 71046 X-RAY EXAM CHEST 2 VIEWS: CPT

## 2024-09-18 PROCEDURE — 99285 EMERGENCY DEPT VISIT HI MDM: CPT

## 2024-09-18 PROCEDURE — 84484 ASSAY OF TROPONIN QUANT: CPT

## 2024-09-18 PROCEDURE — 84443 ASSAY THYROID STIM HORMONE: CPT

## 2024-09-18 PROCEDURE — 81001 URINALYSIS AUTO W/SCOPE: CPT

## 2024-09-18 PROCEDURE — 87086 URINE CULTURE/COLONY COUNT: CPT

## 2024-09-18 PROCEDURE — 87088 URINE BACTERIA CULTURE: CPT

## 2024-09-18 PROCEDURE — 80053 COMPREHEN METABOLIC PANEL: CPT

## 2024-09-18 PROCEDURE — 2580000003 HC RX 258: Performed by: EMERGENCY MEDICINE

## 2024-09-18 PROCEDURE — 93005 ELECTROCARDIOGRAM TRACING: CPT | Performed by: EMERGENCY MEDICINE

## 2024-09-18 PROCEDURE — 6370000000 HC RX 637 (ALT 250 FOR IP): Performed by: EMERGENCY MEDICINE

## 2024-09-18 PROCEDURE — 85025 COMPLETE CBC W/AUTO DIFF WBC: CPT

## 2024-09-18 PROCEDURE — 84436 ASSAY OF TOTAL THYROXINE: CPT

## 2024-09-18 RX ORDER — 0.9 % SODIUM CHLORIDE 0.9 %
1000 INTRAVENOUS SOLUTION INTRAVENOUS ONCE
Status: COMPLETED | OUTPATIENT
Start: 2024-09-18 | End: 2024-09-18

## 2024-09-18 RX ORDER — CEFDINIR 300 MG/1
300 CAPSULE ORAL ONCE
Status: COMPLETED | OUTPATIENT
Start: 2024-09-18 | End: 2024-09-18

## 2024-09-18 RX ADMIN — CEFDINIR 300 MG: 300 CAPSULE ORAL at 21:55

## 2024-09-18 RX ADMIN — SODIUM CHLORIDE 1000 ML: 9 INJECTION, SOLUTION INTRAVENOUS at 18:41

## 2024-09-18 ASSESSMENT — ENCOUNTER SYMPTOMS
VOMITING: 0
NAUSEA: 0
ABDOMINAL PAIN: 0
SHORTNESS OF BREATH: 0
EYE REDNESS: 0

## 2024-09-18 ASSESSMENT — PAIN - FUNCTIONAL ASSESSMENT: PAIN_FUNCTIONAL_ASSESSMENT: NONE - DENIES PAIN

## 2024-09-19 LAB
EKG ATRIAL RATE: 64 BPM
EKG P AXIS: 58 DEGREES
EKG P-R INTERVAL: 186 MS
EKG Q-T INTERVAL: 414 MS
EKG QRS DURATION: 92 MS
EKG QTC CALCULATION (BAZETT): 427 MS
EKG R AXIS: -29 DEGREES
EKG T AXIS: 19 DEGREES
EKG VENTRICULAR RATE: 64 BPM

## 2024-09-19 PROCEDURE — 93010 ELECTROCARDIOGRAM REPORT: CPT | Performed by: INTERNAL MEDICINE

## 2024-09-21 LAB
MICROORGANISM SPEC CULT: ABNORMAL
SERVICE CMNT-IMP: ABNORMAL
SPECIMEN DESCRIPTION: ABNORMAL

## 2024-09-27 ENCOUNTER — TELEPHONE (OUTPATIENT)
Dept: CARDIOLOGY CLINIC | Age: 64
End: 2024-09-27

## 2024-09-27 ENCOUNTER — PATIENT MESSAGE (OUTPATIENT)
Dept: FAMILY MEDICINE CLINIC | Age: 64
End: 2024-09-27

## 2024-09-27 NOTE — TELEPHONE ENCOUNTER
Patient called to get her referral scheduled. She was in the ED, 09/18. No soon availability at this time due to pt care. She las seen Dr. Rudolph in 2021. Please contact patient.

## 2024-09-30 ENCOUNTER — PATIENT MESSAGE (OUTPATIENT)
Dept: FAMILY MEDICINE CLINIC | Age: 64
End: 2024-09-30

## 2024-09-30 ENCOUNTER — TELEPHONE (OUTPATIENT)
Dept: HEMATOLOGY | Age: 64
End: 2024-09-30

## 2024-09-30 DIAGNOSIS — N39.0 URINARY TRACT INFECTION WITHOUT HEMATURIA, SITE UNSPECIFIED: Primary | ICD-10-CM

## 2024-09-30 NOTE — TELEPHONE ENCOUNTER
Sharla is scheduled for 1 year US at [facility]. 11/7/24. Arrive at 9am, scan 930, NPO 8 hours. Called and LVM requesting a return call to clinic.

## 2024-10-02 ENCOUNTER — OFFICE VISIT (OUTPATIENT)
Dept: FAMILY MEDICINE CLINIC | Age: 64
End: 2024-10-02
Payer: MEDICARE

## 2024-10-02 VITALS
RESPIRATION RATE: 18 BRPM | HEIGHT: 63 IN | DIASTOLIC BLOOD PRESSURE: 72 MMHG | SYSTOLIC BLOOD PRESSURE: 131 MMHG | HEART RATE: 80 BPM | BODY MASS INDEX: 51.03 KG/M2 | TEMPERATURE: 97.4 F | WEIGHT: 288 LBS | OXYGEN SATURATION: 98 %

## 2024-10-02 DIAGNOSIS — R39.9 UTI SYMPTOMS: Primary | ICD-10-CM

## 2024-10-02 LAB
BILIRUBIN, POC: NORMAL
BLOOD URINE, POC: NORMAL
CLARITY, POC: CLEAR
COLOR, POC: YELLOW
GLUCOSE URINE, POC: NORMAL MG/DL
KETONES, POC: NORMAL MG/DL
LEUKOCYTE EST, POC: NORMAL
NITRITE, POC: POSITIVE
PH, POC: 5.5
PROTEIN, POC: NORMAL MG/DL
SPECIFIC GRAVITY, POC: 1.02
UROBILINOGEN, POC: 0.2 MG/DL

## 2024-10-02 PROCEDURE — 1090F PRES/ABSN URINE INCON ASSESS: CPT

## 2024-10-02 PROCEDURE — 3078F DIAST BP <80 MM HG: CPT

## 2024-10-02 PROCEDURE — 81002 URINALYSIS NONAUTO W/O SCOPE: CPT

## 2024-10-02 PROCEDURE — 99213 OFFICE O/P EST LOW 20 MIN: CPT

## 2024-10-02 PROCEDURE — G8484 FLU IMMUNIZE NO ADMIN: HCPCS

## 2024-10-02 PROCEDURE — 1036F TOBACCO NON-USER: CPT

## 2024-10-02 PROCEDURE — G8417 CALC BMI ABV UP PARAM F/U: HCPCS

## 2024-10-02 PROCEDURE — G8427 DOCREV CUR MEDS BY ELIG CLIN: HCPCS

## 2024-10-02 PROCEDURE — 1123F ACP DISCUSS/DSCN MKR DOCD: CPT

## 2024-10-02 PROCEDURE — 3075F SYST BP GE 130 - 139MM HG: CPT

## 2024-10-02 PROCEDURE — G8400 PT W/DXA NO RESULTS DOC: HCPCS

## 2024-10-02 PROCEDURE — 3017F COLORECTAL CA SCREEN DOC REV: CPT

## 2024-10-02 RX ORDER — CEFDINIR 300 MG/1
300 CAPSULE ORAL 2 TIMES DAILY
Qty: 14 CAPSULE | Refills: 0 | Status: SHIPPED | OUTPATIENT
Start: 2024-10-02 | End: 2024-10-09

## 2024-10-02 NOTE — PROGRESS NOTES
05/18/2020    LAPAROSCOPIC CHOLECYSTECTOMY WITH INTRAOPERATIVE CHOLANGIOGRAM performed by Hesham Acharya MD at [facility] OR    HERNIA REPAIR      mesh    HYSTERECTOMY (CERVIX STATUS UNKNOWN)      HYSTERECTOMY, VAGINAL      JOINT REPLACEMENT Right 2019    knee  [facility]    KIDNEY STONE SURGERY      incisional removal    OVARY REMOVAL      PAIN MANAGEMENT PROCEDURE Bilateral 07/19/2022    LUMBAR TRANSFORAMINAL EPIDURAL STEROID INJECTION RIGHT L3 AND LEFT L4 UNDER FLUOROSCOPIC GUIDANCE (CPT 00259) performed by Rony Figueroa MD at [facility] OR       Family History   Problem Relation Age of Onset    Lung Cancer Mother         lung    COPD Father     Alcohol Abuse Sister        Medications:     Current Outpatient Medications:     cefdinir (OMNICEF) 300 MG capsule, Take 1 capsule by mouth 2 times daily for 7 days, Disp: 14 capsule, Rfl: 0    pramipexole (MIRAPEX) 0.125 MG tablet, Take 1 tablet by mouth nightly, Disp: 90 tablet, Rfl: 3    pantoprazole (PROTONIX) 40 MG tablet, Take 1 tablet by mouth every morning, Disp: , Rfl:     dulaglutide (TRULICITY) 1.5 MG/0.5ML SC injection, Inject 0.5 mLs into the skin once a week, Disp: 4 Adjustable Dose Pre-filled Pen Syringe, Rfl: 2    lisinopril (PRINIVIL;ZESTRIL) 40 MG tablet, take 1 tablet by mouth once daily, Disp: 90 tablet, Rfl: 1    metoprolol succinate (TOPROL XL) 100 MG extended release tablet, take 1 tablet by mouth once daily, Disp: 90 tablet, Rfl: 1    levothyroxine (SYNTHROID) 75 MCG tablet, take 1 tablet by mouth once daily, Disp: 90 tablet, Rfl: 1    pravastatin (PRAVACHOL) 40 MG tablet, Take 1 tablet by mouth daily, Disp: 90 tablet, Rfl: 1    blood glucose monitor strips, Test 3 times a day & as needed for symptoms of irregular blood glucose. Dispense sufficient amount for indicated testing frequency plus additional to accommodate PRN testing needs. ONE TOUCH ULTRA., Disp: 300 strip, Rfl: 1    citalopram (CELEXA) 20 MG tablet, take 1 tablet by mouth once daily,

## 2024-10-02 NOTE — TELEPHONE ENCOUNTER
Okay to place referral?     Electronically signed by JANESSA GARCIA MA on 10/2/24 at 1:30 PM EDT

## 2024-10-04 ENCOUNTER — TELEPHONE (OUTPATIENT)
Dept: ADMINISTRATIVE | Age: 64
End: 2024-10-04

## 2024-10-04 NOTE — TELEPHONE ENCOUNTER
.Patient Appointment Form:      PCP: Esperanza Joel DO    Referring: Esperanza Joel DO      Has the Patient:    Seen a Cardiologist? yes    date:7/26/2021  Physician:Dr. Rudolph  location:[facility]     Had a heart catheterization? no    Had heart surgery? no    Had a stress test or nuclear stress test? yes   date: 12/13/2019   facility name:  [facility]    Had an echocardiogram? yes   date: 10/7/2021   facility name:  [facility]    Had a vascular ultrasound? no    Had a 24/48 heart monitor or extended cardiac event monitor? extended cardiac event/MCOT monitor    date: unknown       Who ordered: unknown     Had recent blood work in the last 6 months? yes    date: 9/18/2024    ordering physician: Akua Varghese DO    Had a pacemaker/ICD/ILR implant? no    Seen an Electrophysiologist? no        Will send records via: in Epic      Date & time of appointment:  10/15/2024 1:30P

## 2024-10-05 LAB
CULTURE: ABNORMAL
SPECIMEN DESCRIPTION: ABNORMAL

## 2024-10-14 ENCOUNTER — PATIENT MESSAGE (OUTPATIENT)
Dept: FAMILY MEDICINE CLINIC | Age: 64
End: 2024-10-14

## 2024-10-14 PROBLEM — R53.83 FATIGUE: Status: RESOLVED | Noted: 2017-11-28 | Resolved: 2024-10-14

## 2024-10-14 PROBLEM — R06.09 DYSPNEA ON EXERTION: Status: RESOLVED | Noted: 2020-11-12 | Resolved: 2024-10-14

## 2024-10-14 PROBLEM — L30.9 DERMATITIS: Status: RESOLVED | Noted: 2022-06-13 | Resolved: 2024-10-14

## 2024-10-14 PROBLEM — F32.A DEPRESSION: Status: RESOLVED | Noted: 2023-12-02 | Resolved: 2024-10-14

## 2024-10-14 PROBLEM — R10.9 FLANK PAIN: Status: RESOLVED | Noted: 2021-05-24 | Resolved: 2024-10-14

## 2024-10-14 PROBLEM — M19.90 OSTEOARTHRITIS: Status: RESOLVED | Noted: 2020-01-17 | Resolved: 2024-10-14

## 2024-10-14 PROBLEM — R73.09 ELEVATED HEMOGLOBIN A1C: Status: RESOLVED | Noted: 2019-10-10 | Resolved: 2024-10-14

## 2024-10-14 PROBLEM — R74.8 ELEVATED LIVER ENZYMES: Status: RESOLVED | Noted: 2020-01-17 | Resolved: 2024-10-14

## 2024-10-14 PROBLEM — M47.817 LUMBOSACRAL SPONDYLOSIS WITHOUT MYELOPATHY: Status: RESOLVED | Noted: 2022-07-01 | Resolved: 2024-10-14

## 2024-10-14 PROBLEM — R00.2 PALPITATIONS: Status: ACTIVE | Noted: 2024-10-14

## 2024-10-14 PROBLEM — N18.2 STAGE 2 CHRONIC KIDNEY DISEASE: Status: RESOLVED | Noted: 2021-02-25 | Resolved: 2024-10-14

## 2024-10-14 PROBLEM — K22.89 ESOPHAGEAL THICKENING: Status: RESOLVED | Noted: 2023-04-27 | Resolved: 2024-10-14

## 2024-10-14 PROBLEM — R06.02 SHORTNESS OF BREATH: Status: RESOLVED | Noted: 2021-03-20 | Resolved: 2024-10-14

## 2024-10-14 PROBLEM — M25.50 ARTHRALGIA: Status: RESOLVED | Noted: 2020-11-12 | Resolved: 2024-10-14

## 2024-10-14 PROBLEM — M51.369 DDD (DEGENERATIVE DISC DISEASE), LUMBAR: Status: RESOLVED | Noted: 2022-07-01 | Resolved: 2024-10-14

## 2024-10-14 PROBLEM — R73.01 IFG (IMPAIRED FASTING GLUCOSE): Status: RESOLVED | Noted: 2024-06-24 | Resolved: 2024-10-14

## 2024-10-14 PROBLEM — R06.83 SNORES: Status: RESOLVED | Noted: 2021-06-25 | Resolved: 2024-10-14

## 2024-10-17 ENCOUNTER — NURSE ONLY (OUTPATIENT)
Dept: FAMILY MEDICINE CLINIC | Age: 64
End: 2024-10-17
Payer: MEDICARE

## 2024-10-17 DIAGNOSIS — R39.9 UTI SYMPTOMS: Primary | ICD-10-CM

## 2024-10-17 LAB
BILIRUBIN, POC: NEGATIVE
BLOOD URINE, POC: NORMAL
CLARITY, POC: NORMAL
COLOR, POC: YELLOW
GLUCOSE URINE, POC: NEGATIVE MG/DL
KETONES, POC: NEGATIVE MG/DL
LEUKOCYTE EST, POC: NORMAL
NITRITE, POC: POSITIVE
PH, POC: 5.5
PROTEIN, POC: NEGATIVE MG/DL
SPECIFIC GRAVITY, POC: 1.02
UROBILINOGEN, POC: 0.2 MG/DL

## 2024-10-17 PROCEDURE — 81002 URINALYSIS NONAUTO W/O SCOPE: CPT | Performed by: FAMILY MEDICINE

## 2024-10-19 LAB
CULTURE: ABNORMAL
SPECIMEN DESCRIPTION: ABNORMAL

## 2024-10-19 RX ORDER — NITROFURANTOIN 25; 75 MG/1; MG/1
100 CAPSULE ORAL 2 TIMES DAILY
Qty: 20 CAPSULE | Refills: 0 | Status: SHIPPED
Start: 2024-10-19 | End: 2024-10-25

## 2024-10-25 ENCOUNTER — OFFICE VISIT (OUTPATIENT)
Dept: CARDIOLOGY CLINIC | Age: 64
End: 2024-10-25
Payer: MEDICARE

## 2024-10-25 ENCOUNTER — TELEPHONE (OUTPATIENT)
Dept: CARDIOLOGY | Age: 64
End: 2024-10-25

## 2024-10-25 VITALS
WEIGHT: 290 LBS | BODY MASS INDEX: 51.38 KG/M2 | HEART RATE: 71 BPM | SYSTOLIC BLOOD PRESSURE: 130 MMHG | HEIGHT: 63 IN | DIASTOLIC BLOOD PRESSURE: 64 MMHG | RESPIRATION RATE: 18 BRPM

## 2024-10-25 DIAGNOSIS — I10 ESSENTIAL HYPERTENSION: ICD-10-CM

## 2024-10-25 DIAGNOSIS — E66.813 CLASS 3 SEVERE OBESITY DUE TO EXCESS CALORIES WITH SERIOUS COMORBIDITY AND BODY MASS INDEX (BMI) OF 50.0 TO 59.9 IN ADULT: ICD-10-CM

## 2024-10-25 DIAGNOSIS — R00.2 PALPITATIONS: ICD-10-CM

## 2024-10-25 DIAGNOSIS — R06.02 SHORTNESS OF BREATH: ICD-10-CM

## 2024-10-25 DIAGNOSIS — I10 PRIMARY HYPERTENSION: Primary | ICD-10-CM

## 2024-10-25 DIAGNOSIS — E66.01 CLASS 3 SEVERE OBESITY DUE TO EXCESS CALORIES WITH SERIOUS COMORBIDITY AND BODY MASS INDEX (BMI) OF 50.0 TO 59.9 IN ADULT: ICD-10-CM

## 2024-10-25 DIAGNOSIS — E78.49 OTHER HYPERLIPIDEMIA: ICD-10-CM

## 2024-10-25 PROCEDURE — G8400 PT W/DXA NO RESULTS DOC: HCPCS | Performed by: INTERNAL MEDICINE

## 2024-10-25 PROCEDURE — 1036F TOBACCO NON-USER: CPT | Performed by: INTERNAL MEDICINE

## 2024-10-25 PROCEDURE — 3078F DIAST BP <80 MM HG: CPT | Performed by: INTERNAL MEDICINE

## 2024-10-25 PROCEDURE — 93000 ELECTROCARDIOGRAM COMPLETE: CPT | Performed by: INTERNAL MEDICINE

## 2024-10-25 PROCEDURE — 99204 OFFICE O/P NEW MOD 45 MIN: CPT | Performed by: INTERNAL MEDICINE

## 2024-10-25 PROCEDURE — G8427 DOCREV CUR MEDS BY ELIG CLIN: HCPCS | Performed by: INTERNAL MEDICINE

## 2024-10-25 PROCEDURE — 3075F SYST BP GE 130 - 139MM HG: CPT | Performed by: INTERNAL MEDICINE

## 2024-10-25 PROCEDURE — 1123F ACP DISCUSS/DSCN MKR DOCD: CPT | Performed by: INTERNAL MEDICINE

## 2024-10-25 PROCEDURE — 3017F COLORECTAL CA SCREEN DOC REV: CPT | Performed by: INTERNAL MEDICINE

## 2024-10-25 PROCEDURE — G8417 CALC BMI ABV UP PARAM F/U: HCPCS | Performed by: INTERNAL MEDICINE

## 2024-10-25 PROCEDURE — G8484 FLU IMMUNIZE NO ADMIN: HCPCS | Performed by: INTERNAL MEDICINE

## 2024-10-25 PROCEDURE — 1090F PRES/ABSN URINE INCON ASSESS: CPT | Performed by: INTERNAL MEDICINE

## 2024-10-25 RX ORDER — FUROSEMIDE 20 MG/1
20 TABLET ORAL DAILY
Qty: 30 TABLET | Refills: 1 | Status: SHIPPED | OUTPATIENT
Start: 2024-10-25

## 2024-10-25 RX ORDER — AMLODIPINE BESYLATE 5 MG/1
5 TABLET ORAL DAILY
COMMUNITY

## 2024-10-25 RX ORDER — DULOXETIN HYDROCHLORIDE 30 MG/1
30 CAPSULE, DELAYED RELEASE ORAL DAILY
COMMUNITY

## 2024-10-25 RX ORDER — FUROSEMIDE 20 MG/1
20 TABLET ORAL DAILY
Qty: 30 TABLET | Refills: 1 | Status: SHIPPED
Start: 2024-10-25 | End: 2024-10-25

## 2024-10-25 NOTE — PROGRESS NOTES
Patient was seen today for a heart monitor placement ordered by Dr. clark    Monitor type: zio xt  Duration: 7 days  Serial number: [id number]    The monitor was applied and instructions were given.    Eva George MA    
Tobacco comments:     None   Vaping Use    Vaping status: Never Used   Substance and Sexual Activity    Alcohol use: Not Currently     Comment: No caffeine    Drug use: No    Sexual activity: Not Currently     Partners: Male   Other Topics Concern    Not on file   Social History Narrative    Not on file     Social Determinants of Health     Financial Resource Strain: Low Risk  (6/18/2024)    Overall Financial Resource Strain (CARDIA)     Difficulty of Paying Living Expenses: Not hard at all   Food Insecurity: No Food Insecurity (6/18/2024)    Hunger Vital Sign     Worried About Running Out of Food in the Last Year: Never true     Ran Out of Food in the Last Year: Never true   Transportation Needs: Unknown (6/18/2024)    PRAPARE - Transportation     Lack of Transportation (Medical): Not on file     Lack of Transportation (Non-Medical): No   Physical Activity: Inactive (6/18/2024)    Exercise Vital Sign     Days of Exercise per Week: 0 days     Minutes of Exercise per Session: 0 min   Stress: Not on file   Social Connections: Not on file   Intimate Partner Violence: Not on file   Housing Stability: Unknown (6/18/2024)    Housing Stability Vital Sign     Unable to Pay for Housing in the Last Year: Not on file     Number of Places Lived in the Last Year: Not on file     Unstable Housing in the Last Year: No       Family History   Problem Relation Age of Onset    Lung Cancer Mother         lung    COPD Father     Alcohol Abuse Sister        Review of Systems  Constitutional: Negative for fever, malaise/fatigue and weight loss.  HENT: Negative for sore throat and tinnitus.    Eyes: Negative for blurred vision and double vision.  Respiratory: Negative for shortness of breath. Negative for cough and wheezing.    Cardiovascular: As mentioned in HPI.    Gastrointestinal: Negative for abdominal pain, heartburn, nausea and vomiting.  Genitourinary: Negative.    Musculoskeletal: Negative for back pain, joint pain and

## 2024-10-25 NOTE — PATIENT INSTRUCTIONS
Continue all your medications at current doses.   Take lasix 20 mg daily for 5 days. Use as needed thereafter. Call me with an update next week.   Check echo.   Restrict sodium intake to less than 2-2.5 g/day. Restrict fluid intake to less than 2.2 L/day. Goal BP is less than 130/80.   If your weight increases by > 2 lbs in a day or >5 lbs in more than a day, take an extra lasix pill.   Please try to exercise for 150 minutes a week.   I will see you back in the office in 6 months. Please call the office at ([phone], option [phone]) if you have any questions.

## 2024-10-25 NOTE — TELEPHONE ENCOUNTER
Called pt primary decision maker and left message to schedule echo for patient.    Electronically signed by Collette Soares on 10/25/2024 at 2:21 PM

## 2024-10-25 NOTE — TELEPHONE ENCOUNTER
Called patient to schedule echo, was unable to leave message.  Electronically signed by Collette Soares on 10/25/2024 at 2:20 PM

## 2024-10-29 ENCOUNTER — TELEPHONE (OUTPATIENT)
Dept: CARDIOLOGY | Age: 64
End: 2024-10-29

## 2024-10-29 NOTE — TELEPHONE ENCOUNTER
Called patients primary and it goes to [employer]? Called patients primary decision maker, sibling Jose, and LM to schedule patients echo.    Electronically signed by Malini Hickey on 10/29/2024 at 8:46 AM

## 2024-11-14 ENCOUNTER — PATIENT MESSAGE (OUTPATIENT)
Dept: CARDIOLOGY CLINIC | Age: 64
End: 2024-11-14

## 2024-11-14 ENCOUNTER — HOSPITAL ENCOUNTER (OUTPATIENT)
Dept: ULTRASOUND IMAGING | Age: 64
Discharge: HOME | End: 2024-11-14
Attending: TRANSPLANT SURGERY
Payer: COMMERCIAL

## 2024-11-14 DIAGNOSIS — K76.0 NONALCOHOLIC FATTY LIVER DISEASE: ICD-10-CM

## 2024-11-14 PROCEDURE — 76705 ECHO EXAM OF ABDOMEN: CPT

## 2024-11-15 NOTE — TELEPHONE ENCOUNTER
Daniel Magdaleno the Preliminary Findings have now been  received and pending Dr. Chavarria's review

## 2024-11-19 ENCOUNTER — TELEPHONE (OUTPATIENT)
Dept: PHARMACY | Facility: CLINIC | Age: 64
End: 2024-11-19

## 2024-11-21 ENCOUNTER — TELEPHONE (OUTPATIENT)
Dept: CARDIOLOGY CLINIC | Age: 64
End: 2024-11-21

## 2024-11-21 ENCOUNTER — PATIENT MESSAGE (OUTPATIENT)
Dept: FAMILY MEDICINE CLINIC | Age: 64
End: 2024-11-21

## 2024-11-21 DIAGNOSIS — R00.2 PALPITATIONS: ICD-10-CM

## 2024-11-21 NOTE — TELEPHONE ENCOUNTER
----- Message from Dr. Med Chavarria MD sent at 11/21/2024  1:15 PM EST -----  I believe I sent a message yesterday. Since she is still having symptoms, stop norvasc and start cardizem 120 mg daily.   Did lasix help?  Has the trulicity dose been uptitrated by PCP?

## 2024-11-26 ENCOUNTER — PATIENT MESSAGE (OUTPATIENT)
Dept: FAMILY MEDICINE CLINIC | Age: 64
End: 2024-11-26

## 2024-11-26 ENCOUNTER — OFFICE VISIT (OUTPATIENT)
Dept: SURGERY | Age: 64
End: 2024-11-26
Payer: COMMERCIAL

## 2024-11-26 VITALS
OXYGEN SATURATION: 94 % | HEIGHT: 63 IN | RESPIRATION RATE: 16 BRPM | BODY MASS INDEX: 51.38 KG/M2 | HEART RATE: 63 BPM | TEMPERATURE: 97.5 F | WEIGHT: 290 LBS

## 2024-11-26 DIAGNOSIS — E78.2 MIXED HYPERLIPIDEMIA: ICD-10-CM

## 2024-11-26 DIAGNOSIS — I10 ESSENTIAL HYPERTENSION: ICD-10-CM

## 2024-11-26 DIAGNOSIS — E03.9 HYPOTHYROIDISM, UNSPECIFIED TYPE: ICD-10-CM

## 2024-11-26 DIAGNOSIS — K76.0 NAFLD (NONALCOHOLIC FATTY LIVER DISEASE): Primary | ICD-10-CM

## 2024-11-26 PROCEDURE — 1123F ACP DISCUSS/DSCN MKR DOCD: CPT | Performed by: TRANSPLANT SURGERY

## 2024-11-26 PROCEDURE — 99213 OFFICE O/P EST LOW 20 MIN: CPT | Performed by: TRANSPLANT SURGERY

## 2024-11-26 PROCEDURE — 1090F PRES/ABSN URINE INCON ASSESS: CPT | Performed by: TRANSPLANT SURGERY

## 2024-11-26 PROCEDURE — G8400 PT W/DXA NO RESULTS DOC: HCPCS | Performed by: TRANSPLANT SURGERY

## 2024-11-26 PROCEDURE — 3017F COLORECTAL CA SCREEN DOC REV: CPT | Performed by: TRANSPLANT SURGERY

## 2024-11-26 PROCEDURE — 1036F TOBACCO NON-USER: CPT | Performed by: TRANSPLANT SURGERY

## 2024-11-26 PROCEDURE — G8417 CALC BMI ABV UP PARAM F/U: HCPCS | Performed by: TRANSPLANT SURGERY

## 2024-11-26 PROCEDURE — G8484 FLU IMMUNIZE NO ADMIN: HCPCS | Performed by: TRANSPLANT SURGERY

## 2024-11-26 PROCEDURE — G8427 DOCREV CUR MEDS BY ELIG CLIN: HCPCS | Performed by: TRANSPLANT SURGERY

## 2024-11-26 RX ORDER — METOPROLOL SUCCINATE 100 MG/1
TABLET, EXTENDED RELEASE ORAL
Qty: 90 TABLET | Refills: 1 | Status: SHIPPED | OUTPATIENT
Start: 2024-11-26

## 2024-11-26 RX ORDER — LISINOPRIL 40 MG/1
TABLET ORAL
Qty: 90 TABLET | Refills: 1 | Status: SHIPPED | OUTPATIENT
Start: 2024-11-26

## 2024-11-26 RX ORDER — LEVOTHYROXINE SODIUM 75 UG/1
TABLET ORAL
Qty: 90 TABLET | Refills: 1 | Status: SHIPPED | OUTPATIENT
Start: 2024-11-26

## 2024-11-26 RX ORDER — PRAVASTATIN SODIUM 40 MG
40 TABLET ORAL DAILY
Qty: 90 TABLET | Refills: 1 | Status: SHIPPED | OUTPATIENT
Start: 2024-11-26 | End: 2025-05-25

## 2024-11-26 RX ORDER — AMLODIPINE BESYLATE 5 MG/1
5 TABLET ORAL DAILY
Qty: 90 TABLET | Refills: 1 | Status: SHIPPED | OUTPATIENT
Start: 2024-11-26 | End: 2025-05-25

## 2024-11-26 NOTE — TELEPHONE ENCOUNTER
Last Appointment:  6/21/2024  Future Appointments   Date Time Provider Department Center   12/2/2024 10:30 AM [facility]   12/5/2024 12:30 PM SEY WAR ECHO 1 SEYZ YVAN SE Rad/Car   12/18/2024  8:00 AM SEYZ ABDU US RM 3 SEYZ ABDU BC [facility] Rad/Car   12/20/2024 12:00 PM Esperanza Joel, DO [facility] PC [facility]   4/29/2025 10:00 AM SEYZ ABDU [facility] RM 1 SEYZ ABDU BC SE Rad/Car   4/29/2025 11:00 AM Juju Pickering, APRN - CNP [facility]

## 2024-11-26 NOTE — TELEPHONE ENCOUNTER
Last Appointment:  6/21/2024  Future Appointments   Date Time Provider Department Center   12/2/2024 10:30 AM [facility]   12/5/2024 12:30 PM SEY WAR ECHO 1 SEYZ YVAN SE Rad/Car   12/18/2024  8:00 AM SEYZ ABDU US RM 3 SEYZ ABDU BC [facility] Rad/Car   12/20/2024 12:00 PM Esperanza Joel, DO MINERAL PC [facility]   4/29/2025 10:00 AM SEYZ ABDU [facility] RM 1 SEYZ ABDU BC SE Rad/Car   4/29/2025 11:00 AM Juju Pickering, APRN - CNP [facility]

## 2024-11-26 NOTE — PROGRESS NOTES
Hepatobiliary and Pancreatic Surgery Attending History and Physical    Patient's Name/Date of Birth: Sharla Mir /1960 (63 y.o.)    Date: November 26, 2024     CC:hepatomegaly    HPI:  Patient is a very pleasant 63 year old female with morbid obesity and a starting BMI of 53.   She has had DM for the past 2 years and for the last year her hemoglobin A1c has been less than 6. She underwent a cholecystectomy in 2020. She has a family history of liver disease in her sister but states that it is was from alcoholism.  Her liver function tests have remained normal.  Patient underwent a liver workup and her smooth muscle antibody, KATY, iron and TIBC, ferritin, alpha-1 antitrypsin were all negative.  She recently started trulicity and her HbA1c remains 5.4.  She is following with     Physical Exam:  Pulse 63   Temp 97.5 °F (36.4 °C) (Oral)   Resp 16   Ht 1.6 m (5' 3\")   Wt 131.5 kg (290 lb)   LMP  (LMP Unknown)   SpO2 94%   BMI 51.37 kg/m²       PSYCH: mood and affect normal, alert and oriented x 3: No apparent distress, comfortable  EYES: Sclera white, pupils equal round and reactive to light  ENMT:  Hearing normal, trachea midline, ears externally intact  LYMPH: no obvious lympadenopathy in neck.   RESP: Respiratory effort was normal with no retractions or use of accessory muscles.  CV:  No pedal edema  GI/ Abdomen: Soft, nondistended, nontender, no guarding, no peritoneal signs  MSK: no clubbing/ no cyanosis/ gaitnormal    Assessment & Plan   Hepatomegaly , NAFLD in the setting of Morbid obesity with BMI 51, controlled diabetes mellitus type 2.  - I reviewed their images prior to our office visit and we also reviewed them together today.  - discussed continued tight control of her blood sugars that his will help her liver  -We discussed that due to her tight control of her diabetes her transaminases are normal indicating fatty liver disease and not OSORIO.  - US in 1 year  - recommend continuing with

## 2024-11-27 RX ORDER — FUROSEMIDE 20 MG/1
20 TABLET ORAL DAILY
Qty: 30 TABLET | Refills: 3 | Status: SHIPPED | OUTPATIENT
Start: 2024-11-27

## 2024-12-05 ENCOUNTER — HOSPITAL ENCOUNTER (OUTPATIENT)
Dept: CARDIOLOGY | Age: 64
Discharge: HOME | End: 2024-12-07
Attending: INTERNAL MEDICINE
Payer: COMMERCIAL

## 2024-12-05 VITALS
DIASTOLIC BLOOD PRESSURE: 64 MMHG | HEIGHT: 63 IN | SYSTOLIC BLOOD PRESSURE: 130 MMHG | WEIGHT: 280 LBS | BODY MASS INDEX: 49.61 KG/M2

## 2024-12-05 DIAGNOSIS — R06.02 SHORTNESS OF BREATH: ICD-10-CM

## 2024-12-05 PROCEDURE — 93306 TTE W/DOPPLER COMPLETE: CPT

## 2024-12-06 LAB
ECHO AV AREA PEAK VELOCITY: 2.2 CM2
ECHO AV AREA VTI: 2.5 CM2
ECHO AV AREA/BSA PEAK VELOCITY: 1 CM2/M2
ECHO AV AREA/BSA VTI: 1.1 CM2/M2
ECHO AV CUSP MM: 2 CM
ECHO AV MEAN GRADIENT: 7 MMHG
ECHO AV MEAN VELOCITY: 1.2 M/S
ECHO AV PEAK GRADIENT: 14 MMHG
ECHO AV PEAK VELOCITY: 1.8 M/S
ECHO AV VELOCITY RATIO: 0.61
ECHO AV VTI: 38.1 CM
ECHO BSA: 2.38 M2
ECHO EST RA PRESSURE: 3 MMHG
ECHO LA DIAMETER INDEX: 1.88 CM/M2
ECHO LA DIAMETER: 4.2 CM
ECHO LA VOL A-L A2C: 49 ML (ref 22–52)
ECHO LA VOL A-L A4C: 50 ML (ref 22–52)
ECHO LA VOL MOD A2C: 47 ML (ref 22–52)
ECHO LA VOL MOD A4C: 48 ML (ref 22–52)
ECHO LA VOLUME AREA LENGTH: 50 ML
ECHO LA VOLUME INDEX A-L A2C: 22 ML/M2 (ref 16–34)
ECHO LA VOLUME INDEX A-L A4C: 22 ML/M2 (ref 16–34)
ECHO LA VOLUME INDEX AREA LENGTH: 22 ML/M2 (ref 16–34)
ECHO LA VOLUME INDEX MOD A2C: 21 ML/M2 (ref 16–34)
ECHO LA VOLUME INDEX MOD A4C: 22 ML/M2 (ref 16–34)
ECHO LV EF PHYSICIAN: 55 %
ECHO LV FRACTIONAL SHORTENING: 41 % (ref 28–44)
ECHO LV INTERNAL DIMENSION DIASTOLE INDEX: 2.51 CM/M2
ECHO LV INTERNAL DIMENSION DIASTOLIC: 5.6 CM (ref 3.9–5.3)
ECHO LV INTERNAL DIMENSION SYSTOLIC INDEX: 1.48 CM/M2
ECHO LV INTERNAL DIMENSION SYSTOLIC: 3.3 CM
ECHO LV IVSD: 1.3 CM (ref 0.6–0.9)
ECHO LV MASS 2D: 313.2 G (ref 67–162)
ECHO LV MASS INDEX 2D: 140.5 G/M2 (ref 43–95)
ECHO LV POSTERIOR WALL DIASTOLIC: 1.3 CM (ref 0.6–0.9)
ECHO LV RELATIVE WALL THICKNESS RATIO: 0.46
ECHO LVOT AREA: 3.8 CM2
ECHO LVOT AV VTI INDEX: 0.65
ECHO LVOT DIAM: 2.2 CM
ECHO LVOT MEAN GRADIENT: 2 MMHG
ECHO LVOT PEAK GRADIENT: 4 MMHG
ECHO LVOT PEAK VELOCITY: 1.1 M/S
ECHO LVOT STROKE VOLUME INDEX: 42.3 ML/M2
ECHO LVOT SV: 94.2 ML
ECHO LVOT VTI: 24.8 CM
ECHO MV A VELOCITY: 1.05 M/S
ECHO MV AREA PHT: 3.6 CM2
ECHO MV AREA VTI: 3 CM2
ECHO MV E DECELERATION TIME (DT): 248 MS
ECHO MV E VELOCITY: 0.9 M/S
ECHO MV E/A RATIO: 0.86
ECHO MV LVOT VTI INDEX: 1.27
ECHO MV MAX VELOCITY: 1.1 M/S
ECHO MV MEAN GRADIENT: 2 MMHG
ECHO MV MEAN VELOCITY: 0.6 M/S
ECHO MV PEAK GRADIENT: 5 MMHG
ECHO MV PRESSURE HALF TIME (PHT): 61.6 MS
ECHO MV VTI: 31.5 CM
ECHO PV MAX VELOCITY: 1.2 M/S
ECHO PV MEAN GRADIENT: 2 MMHG
ECHO PV MEAN VELOCITY: 0.7 M/S
ECHO PV PEAK GRADIENT: 6 MMHG
ECHO PV VTI: 23.2 CM
ECHO RIGHT VENTRICULAR SYSTOLIC PRESSURE (RVSP): 28 MMHG
ECHO RV INTERNAL DIMENSION: 2.9 CM
ECHO TV REGURGITANT MAX VELOCITY: 2.51 M/S
ECHO TV REGURGITANT PEAK GRADIENT: 25 MMHG

## 2024-12-11 RX ORDER — DULAGLUTIDE 1.5 MG/.5ML
1.5 INJECTION, SOLUTION SUBCUTANEOUS WEEKLY
Qty: 4 ADJUSTABLE DOSE PRE-FILLED PEN SYRINGE | Refills: 2 | Status: SHIPPED | OUTPATIENT
Start: 2024-12-11

## 2024-12-30 ENCOUNTER — TELEPHONE (OUTPATIENT)
Dept: CARDIOLOGY CLINIC | Age: 64
End: 2024-12-30

## 2024-12-30 NOTE — TELEPHONE ENCOUNTER
----- Message from Dr. Med Chavarria MD sent at 12/30/2024  9:21 AM EST -----  Unremarkable echocardiogram findings.

## 2025-01-20 RX ORDER — DULAGLUTIDE 1.5 MG/.5ML
1.5 INJECTION, SOLUTION SUBCUTANEOUS WEEKLY
Qty: 4 ADJUSTABLE DOSE PRE-FILLED PEN SYRINGE | Refills: 2 | Status: SHIPPED | OUTPATIENT
Start: 2025-01-20

## 2025-01-20 NOTE — TELEPHONE ENCOUNTER
Last seen 6/21/2024  Next appt 1/24/2025    Requested Prescriptions     Pending Prescriptions Disp Refills    dulaglutide (TRULICITY) 1.5 MG/0.5ML SC injection 4 Adjustable Dose Pre-filled Pen Syringe 2     Sig: Inject 0.5 mLs into the skin once a week    Electronically signed by JANESSA GARCIA MA on 1/20/25 at 2:45 PM EST

## 2025-01-22 SDOH — HEALTH STABILITY: PHYSICAL HEALTH: ON AVERAGE, HOW MANY MINUTES DO YOU ENGAGE IN EXERCISE AT THIS LEVEL?: 20 MIN

## 2025-01-22 SDOH — ECONOMIC STABILITY: FOOD INSECURITY: WITHIN THE PAST 12 MONTHS, YOU WORRIED THAT YOUR FOOD WOULD RUN OUT BEFORE YOU GOT MONEY TO BUY MORE.: NEVER TRUE

## 2025-01-22 SDOH — ECONOMIC STABILITY: INCOME INSECURITY: IN THE LAST 12 MONTHS, WAS THERE A TIME WHEN YOU WERE NOT ABLE TO PAY THE MORTGAGE OR RENT ON TIME?: NO

## 2025-01-22 SDOH — ECONOMIC STABILITY: FOOD INSECURITY: WITHIN THE PAST 12 MONTHS, THE FOOD YOU BOUGHT JUST DIDN'T LAST AND YOU DIDN'T HAVE MONEY TO GET MORE.: NEVER TRUE

## 2025-01-22 SDOH — HEALTH STABILITY: PHYSICAL HEALTH: ON AVERAGE, HOW MANY DAYS PER WEEK DO YOU ENGAGE IN MODERATE TO STRENUOUS EXERCISE (LIKE A BRISK WALK)?: 3 DAYS

## 2025-01-22 ASSESSMENT — PATIENT HEALTH QUESTIONNAIRE - PHQ9
SUM OF ALL RESPONSES TO PHQ9 QUESTIONS 1 & 2: 0
2. FEELING DOWN, DEPRESSED OR HOPELESS: NOT AT ALL
SUM OF ALL RESPONSES TO PHQ QUESTIONS 1-9: 0
SUM OF ALL RESPONSES TO PHQ QUESTIONS 1-9: 0
1. LITTLE INTEREST OR PLEASURE IN DOING THINGS: NOT AT ALL
SUM OF ALL RESPONSES TO PHQ QUESTIONS 1-9: 0
SUM OF ALL RESPONSES TO PHQ QUESTIONS 1-9: 0

## 2025-01-22 ASSESSMENT — LIFESTYLE VARIABLES
HOW OFTEN DO YOU HAVE A DRINK CONTAINING ALCOHOL: MONTHLY OR LESS
HOW MANY STANDARD DRINKS CONTAINING ALCOHOL DO YOU HAVE ON A TYPICAL DAY: 1 OR 2

## 2025-02-04 SDOH — HEALTH STABILITY: PHYSICAL HEALTH: ON AVERAGE, HOW MANY DAYS PER WEEK DO YOU ENGAGE IN MODERATE TO STRENUOUS EXERCISE (LIKE A BRISK WALK)?: 3 DAYS

## 2025-02-04 SDOH — HEALTH STABILITY: PHYSICAL HEALTH: ON AVERAGE, HOW MANY MINUTES DO YOU ENGAGE IN EXERCISE AT THIS LEVEL?: 20 MIN

## 2025-02-04 ASSESSMENT — PATIENT HEALTH QUESTIONNAIRE - PHQ9
SUM OF ALL RESPONSES TO PHQ QUESTIONS 1-9: 0
SUM OF ALL RESPONSES TO PHQ9 QUESTIONS 1 & 2: 0
SUM OF ALL RESPONSES TO PHQ QUESTIONS 1-9: 0
2. FEELING DOWN, DEPRESSED OR HOPELESS: NOT AT ALL
1. LITTLE INTEREST OR PLEASURE IN DOING THINGS: NOT AT ALL

## 2025-02-04 ASSESSMENT — LIFESTYLE VARIABLES
HOW OFTEN DO YOU HAVE SIX OR MORE DRINKS ON ONE OCCASION: 1
HOW MANY STANDARD DRINKS CONTAINING ALCOHOL DO YOU HAVE ON A TYPICAL DAY: 1 OR 2
HOW OFTEN DO YOU HAVE A DRINK CONTAINING ALCOHOL: MONTHLY OR LESS
HOW OFTEN DO YOU HAVE A DRINK CONTAINING ALCOHOL: 2
HOW MANY STANDARD DRINKS CONTAINING ALCOHOL DO YOU HAVE ON A TYPICAL DAY: 1

## 2025-02-07 ENCOUNTER — OFFICE VISIT (OUTPATIENT)
Dept: FAMILY MEDICINE CLINIC | Age: 65
End: 2025-02-07
Payer: MEDICARE

## 2025-02-07 VITALS
WEIGHT: 285 LBS | BODY MASS INDEX: 50.5 KG/M2 | DIASTOLIC BLOOD PRESSURE: 84 MMHG | RESPIRATION RATE: 18 BRPM | TEMPERATURE: 97.5 F | OXYGEN SATURATION: 94 % | SYSTOLIC BLOOD PRESSURE: 138 MMHG | HEART RATE: 88 BPM | HEIGHT: 63 IN

## 2025-02-07 DIAGNOSIS — E03.9 HYPOTHYROIDISM, UNSPECIFIED TYPE: ICD-10-CM

## 2025-02-07 DIAGNOSIS — I10 ESSENTIAL HYPERTENSION: ICD-10-CM

## 2025-02-07 DIAGNOSIS — R73.01 IFG (IMPAIRED FASTING GLUCOSE): ICD-10-CM

## 2025-02-07 DIAGNOSIS — E11.65 TYPE 2 DIABETES MELLITUS WITH HYPERGLYCEMIA, WITHOUT LONG-TERM CURRENT USE OF INSULIN (HCC): ICD-10-CM

## 2025-02-07 DIAGNOSIS — E78.2 MIXED HYPERLIPIDEMIA: ICD-10-CM

## 2025-02-07 DIAGNOSIS — R53.83 FATIGUE, UNSPECIFIED TYPE: ICD-10-CM

## 2025-02-07 DIAGNOSIS — N20.0 RECURRENT KIDNEY STONES: ICD-10-CM

## 2025-02-07 DIAGNOSIS — Z00.00 INITIAL MEDICARE ANNUAL WELLNESS VISIT: ICD-10-CM

## 2025-02-07 DIAGNOSIS — N18.32 STAGE 3B CHRONIC KIDNEY DISEASE (HCC): ICD-10-CM

## 2025-02-07 DIAGNOSIS — E55.9 VITAMIN D DEFICIENCY: ICD-10-CM

## 2025-02-07 DIAGNOSIS — Z00.00 MEDICARE ANNUAL WELLNESS VISIT, INITIAL: Primary | ICD-10-CM

## 2025-02-07 LAB — HBA1C MFR BLD: 5.8 %

## 2025-02-07 PROCEDURE — G0438 PPPS, INITIAL VISIT: HCPCS | Performed by: FAMILY MEDICINE

## 2025-02-07 PROCEDURE — 1160F RVW MEDS BY RX/DR IN RCRD: CPT | Performed by: FAMILY MEDICINE

## 2025-02-07 PROCEDURE — 3079F DIAST BP 80-89 MM HG: CPT | Performed by: FAMILY MEDICINE

## 2025-02-07 PROCEDURE — 83036 HEMOGLOBIN GLYCOSYLATED A1C: CPT | Performed by: FAMILY MEDICINE

## 2025-02-07 PROCEDURE — 1159F MED LIST DOCD IN RCRD: CPT | Performed by: FAMILY MEDICINE

## 2025-02-07 PROCEDURE — 3017F COLORECTAL CA SCREEN DOC REV: CPT | Performed by: FAMILY MEDICINE

## 2025-02-07 PROCEDURE — 3075F SYST BP GE 130 - 139MM HG: CPT | Performed by: FAMILY MEDICINE

## 2025-02-07 PROCEDURE — 3044F HG A1C LEVEL LT 7.0%: CPT | Performed by: FAMILY MEDICINE

## 2025-02-07 PROCEDURE — 1123F ACP DISCUSS/DSCN MKR DOCD: CPT | Performed by: FAMILY MEDICINE

## 2025-02-07 RX ORDER — GABAPENTIN 300 MG/1
1 CAPSULE ORAL EVERY 12 HOURS
COMMUNITY
Start: 2024-12-12

## 2025-02-07 SDOH — ECONOMIC STABILITY: FOOD INSECURITY: WITHIN THE PAST 12 MONTHS, YOU WORRIED THAT YOUR FOOD WOULD RUN OUT BEFORE YOU GOT MONEY TO BUY MORE.: NEVER TRUE

## 2025-02-07 SDOH — ECONOMIC STABILITY: FOOD INSECURITY: WITHIN THE PAST 12 MONTHS, THE FOOD YOU BOUGHT JUST DIDN'T LAST AND YOU DIDN'T HAVE MONEY TO GET MORE.: NEVER TRUE

## 2025-02-07 ASSESSMENT — PATIENT HEALTH QUESTIONNAIRE - PHQ9
4. FEELING TIRED OR HAVING LITTLE ENERGY: SEVERAL DAYS
9. THOUGHTS THAT YOU WOULD BE BETTER OFF DEAD, OR OF HURTING YOURSELF: NOT AT ALL
6. FEELING BAD ABOUT YOURSELF - OR THAT YOU ARE A FAILURE OR HAVE LET YOURSELF OR YOUR FAMILY DOWN: NOT AT ALL
SUM OF ALL RESPONSES TO PHQ9 QUESTIONS 1 & 2: 0
5. POOR APPETITE OR OVEREATING: NOT AT ALL
1. LITTLE INTEREST OR PLEASURE IN DOING THINGS: NOT AT ALL
10. IF YOU CHECKED OFF ANY PROBLEMS, HOW DIFFICULT HAVE THESE PROBLEMS MADE IT FOR YOU TO DO YOUR WORK, TAKE CARE OF THINGS AT HOME, OR GET ALONG WITH OTHER PEOPLE: NOT DIFFICULT AT ALL
7. TROUBLE CONCENTRATING ON THINGS, SUCH AS READING THE NEWSPAPER OR WATCHING TELEVISION: NOT AT ALL
SUM OF ALL RESPONSES TO PHQ QUESTIONS 1-9: 1
2. FEELING DOWN, DEPRESSED OR HOPELESS: NOT AT ALL
SUM OF ALL RESPONSES TO PHQ QUESTIONS 1-9: 1
3. TROUBLE FALLING OR STAYING ASLEEP: NOT AT ALL
8. MOVING OR SPEAKING SO SLOWLY THAT OTHER PEOPLE COULD HAVE NOTICED. OR THE OPPOSITE, BEING SO FIGETY OR RESTLESS THAT YOU HAVE BEEN MOVING AROUND A LOT MORE THAN USUAL: NOT AT ALL

## 2025-02-11 PROBLEM — E78.2 MIXED HYPERLIPIDEMIA: Status: ACTIVE | Noted: 2025-02-11

## 2025-02-11 PROBLEM — R53.83 FATIGUE: Status: ACTIVE | Noted: 2025-02-11

## 2025-02-11 NOTE — PATIENT INSTRUCTIONS
you want.  There are two main types of advance directives. You can change them any time your wishes change.  Living will.  This form tells your family and your doctor your wishes about life support and other treatment. The form is also called a declaration.  Medical power of .  This form lets you name a person to make treatment decisions for you when you can't speak for yourself. This person is called a health care agent (health care proxy, health care surrogate). The form is also called a durable power of  for health care.  If you do not have an advance directive, decisions about your medical care may be made by a family member, or by a doctor or a  who doesn't know you.  It may help to think of an advance directive as a gift to the people who care for you. If you have one, they won't have to make tough decisions by themselves.  For more information, including forms for your state, see the [employer] website (www.caringinfo.org/planning/advance-directives/).  Follow-up care is a key part of your treatment and safety. Be sure to make and go to all appointments, and call your doctor if you are having problems. It's also a good idea to know your test results and keep a list of the medicines you take.  What should you include in an advance directive?  Many states have a unique advance directive form. (It may ask you to address specific issues.) Or you might use a universal form that's approved by many states.  If your form doesn't tell you what to address, it may be hard to know what to include in your advance directive. Use the questions below to help you get started.  Who do you want to make decisions about your medical care if you are not able to?  What life-support measures do you want if you have a serious illness that gets worse over time or can't be cured?  What are you most afraid of that might happen? (Maybe you're afraid of having pain, losing your independence, or being kept alive by

## 2025-02-11 NOTE — PROGRESS NOTES
Recommendations for Preventive Services Due: see orders and patient instructions/AVS.  Recommended screening schedule for the next 5-10 years is provided to the patient in written form: see Patient Instructions/AVS.     Reviewed and updated this visit:  Tobacco  Allergies  Meds  Problems  Sexual Hx

## 2025-02-19 ENCOUNTER — HOSPITAL ENCOUNTER (OUTPATIENT)
Age: 65
Discharge: HOME | End: 2025-02-19
Payer: MEDICARE

## 2025-02-19 DIAGNOSIS — E03.9 HYPOTHYROIDISM, UNSPECIFIED TYPE: ICD-10-CM

## 2025-02-19 DIAGNOSIS — E55.9 VITAMIN D DEFICIENCY: ICD-10-CM

## 2025-02-19 DIAGNOSIS — I10 ESSENTIAL HYPERTENSION: ICD-10-CM

## 2025-02-19 DIAGNOSIS — E78.2 MIXED HYPERLIPIDEMIA: ICD-10-CM

## 2025-02-19 DIAGNOSIS — E11.65 TYPE 2 DIABETES MELLITUS WITH HYPERGLYCEMIA, WITHOUT LONG-TERM CURRENT USE OF INSULIN (HCC): ICD-10-CM

## 2025-02-19 DIAGNOSIS — R53.83 FATIGUE, UNSPECIFIED TYPE: ICD-10-CM

## 2025-02-19 LAB
25(OH)D3 SERPL-MCNC: 20 NG/ML (ref 30–100)
ALBUMIN SERPL-MCNC: 4.3 G/DL (ref 3.5–5.2)
ALP SERPL-CCNC: 115 U/L (ref 35–104)
ALT SERPL-CCNC: 21 U/L (ref 0–32)
ANION GAP SERPL CALCULATED.3IONS-SCNC: 12 MMOL/L (ref 7–16)
AST SERPL-CCNC: 19 U/L (ref 0–31)
BASOPHILS # BLD: 0.05 K/UL (ref 0–0.2)
BASOPHILS NFR BLD: 1 % (ref 0–2)
BILIRUB SERPL-MCNC: 0.7 MG/DL (ref 0–1.2)
BUN SERPL-MCNC: 17 MG/DL (ref 6–23)
CALCIUM SERPL-MCNC: 9.7 MG/DL (ref 8.6–10.2)
CHLORIDE SERPL-SCNC: 105 MMOL/L (ref 98–107)
CHOLEST SERPL-MCNC: 136 MG/DL
CO2 SERPL-SCNC: 25 MMOL/L (ref 22–29)
CREAT SERPL-MCNC: 0.9 MG/DL (ref 0.5–1)
CREAT UR-MCNC: 221.3 MG/DL (ref 29–226)
EOSINOPHIL # BLD: 0.14 K/UL (ref 0.05–0.5)
EOSINOPHILS RELATIVE PERCENT: 1 % (ref 0–6)
ERYTHROCYTE [DISTWIDTH] IN BLOOD BY AUTOMATED COUNT: 15.6 % (ref 11.5–15)
GFR, ESTIMATED: 74 ML/MIN/1.73M2
GLUCOSE SERPL-MCNC: 121 MG/DL (ref 74–99)
HCT VFR BLD AUTO: 43.5 % (ref 34–48)
HDLC SERPL-MCNC: 33 MG/DL
HGB BLD-MCNC: 13.8 G/DL (ref 11.5–15.5)
IMM GRANULOCYTES # BLD AUTO: 0.09 K/UL (ref 0–0.58)
IMM GRANULOCYTES NFR BLD: 1 % (ref 0–5)
LDLC SERPL CALC-MCNC: 58 MG/DL
LYMPHOCYTES NFR BLD: 2.42 K/UL (ref 1.5–4)
LYMPHOCYTES RELATIVE PERCENT: 25 % (ref 20–42)
MAGNESIUM SERPL-MCNC: 2.1 MG/DL (ref 1.6–2.6)
MCH RBC QN AUTO: 27.1 PG (ref 26–35)
MCHC RBC AUTO-ENTMCNC: 31.7 G/DL (ref 32–34.5)
MCV RBC AUTO: 85.5 FL (ref 80–99.9)
MICROALBUMIN UR-MCNC: 161 MG/L (ref 0–19)
MICROALBUMIN/CREAT UR-RTO: 73 MCG/MG CREAT (ref 0–30)
MONOCYTES NFR BLD: 0.73 K/UL (ref 0.1–0.95)
MONOCYTES NFR BLD: 8 % (ref 2–12)
NEUTROPHILS NFR BLD: 65 % (ref 43–80)
NEUTS SEG NFR BLD: 6.31 K/UL (ref 1.8–7.3)
PLATELET # BLD AUTO: 215 K/UL (ref 130–450)
PMV BLD AUTO: 10.7 FL (ref 7–12)
POTASSIUM SERPL-SCNC: 3.8 MMOL/L (ref 3.5–5)
PROT SERPL-MCNC: 7.7 G/DL (ref 6.4–8.3)
RBC # BLD AUTO: 5.09 M/UL (ref 3.5–5.5)
SODIUM SERPL-SCNC: 142 MMOL/L (ref 132–146)
TRIGL SERPL-MCNC: 225 MG/DL
TSH SERPL DL<=0.05 MIU/L-ACNC: 2.58 UIU/ML (ref 0.27–4.2)
VIT B12 SERPL-MCNC: 303 PG/ML (ref 211–946)
VLDLC SERPL CALC-MCNC: 45 MG/DL
WBC OTHER # BLD: 9.7 K/UL (ref 4.5–11.5)

## 2025-02-19 PROCEDURE — 82570 ASSAY OF URINE CREATININE: CPT

## 2025-02-19 PROCEDURE — 85025 COMPLETE CBC W/AUTO DIFF WBC: CPT

## 2025-02-19 PROCEDURE — 83735 ASSAY OF MAGNESIUM: CPT

## 2025-02-19 PROCEDURE — 82607 VITAMIN B-12: CPT

## 2025-02-19 PROCEDURE — 84443 ASSAY THYROID STIM HORMONE: CPT

## 2025-02-19 PROCEDURE — 82306 VITAMIN D 25 HYDROXY: CPT

## 2025-02-19 PROCEDURE — 82043 UR ALBUMIN QUANTITATIVE: CPT

## 2025-02-19 PROCEDURE — 36415 COLL VENOUS BLD VENIPUNCTURE: CPT

## 2025-02-19 PROCEDURE — 80053 COMPREHEN METABOLIC PANEL: CPT

## 2025-02-19 PROCEDURE — 80061 LIPID PANEL: CPT

## 2025-02-26 DIAGNOSIS — Z12.31 ENCOUNTER FOR SCREENING MAMMOGRAM FOR BREAST CANCER: Primary | ICD-10-CM

## 2025-02-28 ENCOUNTER — APPOINTMENT (OUTPATIENT)
Dept: GENERAL RADIOLOGY | Age: 65
End: 2025-02-28
Payer: MEDICARE

## 2025-02-28 ENCOUNTER — HOSPITAL ENCOUNTER (EMERGENCY)
Age: 65
Discharge: HOME | End: 2025-02-28
Payer: MEDICARE

## 2025-02-28 VITALS
RESPIRATION RATE: 18 BRPM | OXYGEN SATURATION: 97 % | SYSTOLIC BLOOD PRESSURE: 131 MMHG | HEART RATE: 79 BPM | DIASTOLIC BLOOD PRESSURE: 83 MMHG | TEMPERATURE: 97.8 F

## 2025-02-28 DIAGNOSIS — M10.9 ACUTE GOUT OF RIGHT FOOT, UNSPECIFIED CAUSE: Primary | ICD-10-CM

## 2025-02-28 LAB
ANION GAP SERPL CALCULATED.3IONS-SCNC: 12 MMOL/L (ref 7–16)
BASOPHILS # BLD: 0.05 K/UL (ref 0–0.2)
BASOPHILS NFR BLD: 0 % (ref 0–2)
BUN SERPL-MCNC: 9 MG/DL (ref 6–23)
CALCIUM SERPL-MCNC: 9.7 MG/DL (ref 8.6–10.2)
CHLORIDE SERPL-SCNC: 102 MMOL/L (ref 98–107)
CO2 SERPL-SCNC: 29 MMOL/L (ref 22–29)
CREAT SERPL-MCNC: 1 MG/DL (ref 0.5–1)
EOSINOPHIL # BLD: 0.13 K/UL (ref 0.05–0.5)
EOSINOPHILS RELATIVE PERCENT: 1 % (ref 0–6)
ERYTHROCYTE [DISTWIDTH] IN BLOOD BY AUTOMATED COUNT: 15.8 % (ref 11.5–15)
GFR, ESTIMATED: 66 ML/MIN/1.73M2
GLUCOSE SERPL-MCNC: 125 MG/DL (ref 74–99)
HCT VFR BLD AUTO: 42.7 % (ref 34–48)
HGB BLD-MCNC: 13.8 G/DL (ref 11.5–15.5)
IMM GRANULOCYTES # BLD AUTO: 0.06 K/UL (ref 0–0.58)
IMM GRANULOCYTES NFR BLD: 1 % (ref 0–5)
LYMPHOCYTES NFR BLD: 2.46 K/UL (ref 1.5–4)
LYMPHOCYTES RELATIVE PERCENT: 20 % (ref 20–42)
MCH RBC QN AUTO: 27.3 PG (ref 26–35)
MCHC RBC AUTO-ENTMCNC: 32.3 G/DL (ref 32–34.5)
MCV RBC AUTO: 84.6 FL (ref 80–99.9)
MONOCYTES NFR BLD: 0.86 K/UL (ref 0.1–0.95)
MONOCYTES NFR BLD: 7 % (ref 2–12)
NEUTROPHILS NFR BLD: 71 % (ref 43–80)
NEUTS SEG NFR BLD: 8.58 K/UL (ref 1.8–7.3)
PLATELET # BLD AUTO: 244 K/UL (ref 130–450)
PMV BLD AUTO: 10.8 FL (ref 7–12)
POTASSIUM SERPL-SCNC: 3.4 MMOL/L (ref 3.5–5)
RBC # BLD AUTO: 5.05 M/UL (ref 3.5–5.5)
SODIUM SERPL-SCNC: 143 MMOL/L (ref 132–146)
URATE SERPL-MCNC: 7.6 MG/DL (ref 2.4–5.7)
WBC OTHER # BLD: 12.1 K/UL (ref 4.5–11.5)

## 2025-02-28 PROCEDURE — 85025 COMPLETE CBC W/AUTO DIFF WBC: CPT

## 2025-02-28 PROCEDURE — 99284 EMERGENCY DEPT VISIT MOD MDM: CPT

## 2025-02-28 PROCEDURE — 84550 ASSAY OF BLOOD/URIC ACID: CPT

## 2025-02-28 PROCEDURE — 73630 X-RAY EXAM OF FOOT: CPT

## 2025-02-28 PROCEDURE — 80048 BASIC METABOLIC PNL TOTAL CA: CPT

## 2025-02-28 PROCEDURE — 6370000000 HC RX 637 (ALT 250 FOR IP): Performed by: PHYSICIAN ASSISTANT

## 2025-02-28 RX ORDER — INDOMETHACIN 50 MG/1
50 CAPSULE ORAL 3 TIMES DAILY PRN
Qty: 20 CAPSULE | Refills: 0 | Status: SHIPPED | OUTPATIENT
Start: 2025-02-28

## 2025-02-28 RX ORDER — ACETAMINOPHEN 325 MG/1
650 TABLET ORAL ONCE
Status: COMPLETED | OUTPATIENT
Start: 2025-02-28 | End: 2025-02-28

## 2025-02-28 RX ORDER — PREDNISONE 20 MG/1
60 TABLET ORAL DAILY
Qty: 15 TABLET | Refills: 0 | Status: SHIPPED | OUTPATIENT
Start: 2025-02-28 | End: 2025-02-28 | Stop reason: ALTCHOICE

## 2025-02-28 RX ADMIN — ACETAMINOPHEN 650 MG: 325 TABLET ORAL at 17:56

## 2025-02-28 ASSESSMENT — PAIN DESCRIPTION - LOCATION: LOCATION: FOOT

## 2025-02-28 ASSESSMENT — PAIN DESCRIPTION - ORIENTATION: ORIENTATION: RIGHT

## 2025-02-28 ASSESSMENT — LIFESTYLE VARIABLES
HOW OFTEN DO YOU HAVE A DRINK CONTAINING ALCOHOL: NEVER
HOW MANY STANDARD DRINKS CONTAINING ALCOHOL DO YOU HAVE ON A TYPICAL DAY: PATIENT DOES NOT DRINK

## 2025-02-28 ASSESSMENT — PAIN DESCRIPTION - DESCRIPTORS: DESCRIPTORS: DISCOMFORT

## 2025-02-28 ASSESSMENT — PAIN SCALES - GENERAL: PAINLEVEL_OUTOF10: 4

## 2025-02-28 NOTE — ED PROVIDER NOTES
Temp 97.8 °F (36.6 °C)   Resp 18   LMP  (LMP Unknown)   SpO2 97% .       ED Course as of 02/28/25 1915 Fri Feb 28, 2025 1902 Patient is a 64-year-old female presenting emergency department with right-sided foot pain for the last 2 days.  No injury.  Minimal leukocytosis with a white blood cell count of 12.1.  No evidence of anemia.  Renal function within normal limits.  Uric acid is elevated at 7.6.  X-ray does not reveal any evidence of acute fracture or osteomyelitis.  Overall she is nontoxic-appearing, afebrile, no acute distress.  Neurovascular intact stable vitals.  At this time we will plan outpatient management with NSAIDs as she reports that her blood sugar is significantly affected by steroids.  Advised follow very close with PCP for recheck return the emergency department with any new or worsening symptoms.  Patient voiced understanding and is agreeable to the above treatment plan. [MS]   1902 Differential diagnosis include but not limited to gout, fracture, osteomyelitis [MS]      ED Course User Index  [MS] Yanni Robert PA         Discharge Instructions:   Patient referred to  Uzair Brito Jr., DPM  [address] [facility] [address]  [phone]    Call in 3 days      Esperanza Joel, DO  [address] [facility] [id number] [phone]    In 3 days        MEDICATIONS:   DISCHARGE MEDICATIONS:  New Prescriptions    INDOMETHACIN (INDOCIN) 50 MG CAPSULE    Take 1 capsule by mouth 3 times daily as needed for Pain Take with food.       DISCONTINUED MEDICATIONS:  Discontinued Medications    No medications on file       Record Review:  [facility] Reviewed : [facility]        Disposition Considerations:  This patient's ED course included: a personal history and physicial examination and re-evaluation prior to disposition  This patient has remained hemodynamically stable during their ED course.       I emphasized the importance of follow-up with the  physician I referred them to in the timeframe recommended.  I discussed with the patient emergent symptoms and the need to immediately return to the ER. Written information was included in their discharge instructions. Additional verbal discharge instructions were also given and discussed with the patient to supplement those generated by the EMR. We also discussed medications that were prescribed  (if any) including common side effects and interactions. The patient was advised to abstain from driving, operating heavy machinery or making significant decisions while taking medications such as opiates and muscle relaxers that may impair this. All questions were addressed.  They understand return precautions and discharge instructions. The patient  expressed understanding. Vitals were stable and they were in no distress at discharge.     Counseling:   The emergency provider has spoken with the patient and discussed today’s results, in addition to providing specific details for the plan of care and counseling regarding the diagnosis and prognosis.  Questions are answered at this time and they are agreeable with the plan.      --------------------------------- IMPRESSION AND DISPOSITION ---------------------------------    IMPRESSION  1. Acute gout of right foot, unspecified cause        DISPOSITION  Disposition: Discharge to home  Patient condition is stable      NOTE: This report was transcribed using voice recognition software. Every effort was made to ensure accuracy; however, inadvertent computerized transcription errors may be present      Yanni Robert PA  02/28/25 1915

## 2025-03-01 NOTE — ED NOTES
Patient was given discharge instructions,. All questions answered, no distress noted at the time of discharge.

## 2025-03-07 RX ORDER — ALLOPURINOL 100 MG/1
100 TABLET ORAL DAILY
Qty: 90 TABLET | Refills: 1 | Status: SHIPPED | OUTPATIENT
Start: 2025-03-07

## 2025-03-11 ENCOUNTER — HOSPITAL ENCOUNTER (OUTPATIENT)
Dept: GENERAL RADIOLOGY | Age: 65
Discharge: HOME | End: 2025-03-13
Payer: MEDICARE

## 2025-03-11 DIAGNOSIS — N60.01 BILATERAL BREAST CYSTS: ICD-10-CM

## 2025-03-11 DIAGNOSIS — N60.02 BILATERAL BREAST CYSTS: ICD-10-CM

## 2025-03-11 PROCEDURE — 76641 ULTRASOUND BREAST COMPLETE: CPT

## 2025-03-24 DIAGNOSIS — M54.2 CERVICAL PAIN: Primary | ICD-10-CM

## 2025-04-03 ENCOUNTER — HOSPITAL ENCOUNTER (OUTPATIENT)
Age: 65
Discharge: HOME | End: 2025-04-05
Payer: MEDICARE

## 2025-04-03 ENCOUNTER — HOSPITAL ENCOUNTER (OUTPATIENT)
Dept: GENERAL RADIOLOGY | Age: 65
Discharge: HOME | End: 2025-04-05
Payer: MEDICARE

## 2025-04-03 DIAGNOSIS — M54.2 CERVICAL PAIN: ICD-10-CM

## 2025-04-03 PROCEDURE — 72050 X-RAY EXAM NECK SPINE 4/5VWS: CPT

## 2025-04-07 ENCOUNTER — RESULTS FOLLOW-UP (OUTPATIENT)
Dept: FAMILY MEDICINE CLINIC | Age: 65
End: 2025-04-07

## 2025-04-07 DIAGNOSIS — M47.812 OSTEOARTHRITIS OF CERVICAL SPINE, UNSPECIFIED SPINAL OSTEOARTHRITIS COMPLICATION STATUS: Primary | ICD-10-CM

## 2025-04-07 NOTE — PROGRESS NOTES
Subjective:    This note was copied forward from the last encounter.  Essential components for this patient record were reviewed and verified on this visit including:  recent hospitalizations, recent imaging, PMH, PSH, FH, SOC HX, Allergies, and Medications were reviewed and updated as appropriate.  In addition, the assessment and plan were copied from prior office note and updated accordingly.     Patient ID: Sharla Mir is a 64 y.o. female.    HPI  Sharla Mir presents for evaluation of a Bilateral breast lesion    PCP: Esperanza Joel DO. Gynecologist:none.    Sharla is a pleasant 64 y.o. female with a complex past medical history that includes severe/class III obesity, hypertension, hyperlipidemia, fatigue, chronic pain of the knees bilaterally, diabetes type 2,  chronic kidney disease, fatty liver, and vitamin D deficiency.  She presents today for follow up of bilateral breast masses.  She has no personal history of breast cancer.  Cancer risk factors include gender and elevated BMI.  Menarche age 11.  Menopause age 47.  Denies HRT.  G3, P3.  First live birth age 23 she did not breast-feed.  Ashkenazi [place] ancestry.  No history of prior thoracic radiation therapy.  Breast size 36C.  Non-smoker.  Animal caffeine intake.    Breast Imaging to date @ [facility]:  Grade C breast tissue density.  TC IBS risk score 6.7% Lifetime.  02/23/2021 bilateral well-circumscribed masses, most likely cysts BI-RADS 0.  03/04/2021 bilateral breast US: Multiple bilateral cysts, including mildly complex cyst at the 12 o'clock position of the right breast.  Probably benign, BI-RADS 3  06/17/2021 bilateral diagnostic mammogram/bilateral breast ultrasounds: Bilateral oval masses; Probably benign, BI-RADS 3.  12/21/2021 bilateral breast ultrasounds stable bilateral complicated cysts and masses are probably benign.  BI-RADS 3.  03/30/2023 bilateral diagnostic mammogram/bilateral limited breast ultrasound: Bilateral circumscribed

## 2025-04-29 ENCOUNTER — HOSPITAL ENCOUNTER (OUTPATIENT)
Dept: GENERAL RADIOLOGY | Age: 65
Discharge: HOME | End: 2025-05-01
Payer: MEDICARE

## 2025-04-29 ENCOUNTER — OFFICE VISIT (OUTPATIENT)
Dept: BREAST CENTER | Age: 65
End: 2025-04-29
Payer: MEDICARE

## 2025-04-29 VITALS
TEMPERATURE: 98.4 F | OXYGEN SATURATION: 96 % | RESPIRATION RATE: 20 BRPM | DIASTOLIC BLOOD PRESSURE: 84 MMHG | BODY MASS INDEX: 49.79 KG/M2 | HEIGHT: 63 IN | HEART RATE: 75 BPM | SYSTOLIC BLOOD PRESSURE: 126 MMHG | WEIGHT: 281 LBS

## 2025-04-29 VITALS — BODY MASS INDEX: 48.9 KG/M2 | WEIGHT: 276 LBS

## 2025-04-29 DIAGNOSIS — Z12.31 ENCOUNTER FOR SCREENING MAMMOGRAM FOR BREAST CANCER: ICD-10-CM

## 2025-04-29 DIAGNOSIS — Z87.2 HX OF CYST OF BREAST: Primary | ICD-10-CM

## 2025-04-29 DIAGNOSIS — L57.8 SUN-DAMAGED SKIN: ICD-10-CM

## 2025-04-29 PROCEDURE — 1036F TOBACCO NON-USER: CPT | Performed by: NURSE PRACTITIONER

## 2025-04-29 PROCEDURE — 1123F ACP DISCUSS/DSCN MKR DOCD: CPT | Performed by: NURSE PRACTITIONER

## 2025-04-29 PROCEDURE — 1090F PRES/ABSN URINE INCON ASSESS: CPT | Performed by: NURSE PRACTITIONER

## 2025-04-29 PROCEDURE — 3017F COLORECTAL CA SCREEN DOC REV: CPT | Performed by: NURSE PRACTITIONER

## 2025-04-29 PROCEDURE — G8417 CALC BMI ABV UP PARAM F/U: HCPCS | Performed by: NURSE PRACTITIONER

## 2025-04-29 PROCEDURE — G8400 PT W/DXA NO RESULTS DOC: HCPCS | Performed by: NURSE PRACTITIONER

## 2025-04-29 PROCEDURE — 99213 OFFICE O/P EST LOW 20 MIN: CPT | Performed by: NURSE PRACTITIONER

## 2025-04-29 PROCEDURE — 3079F DIAST BP 80-89 MM HG: CPT | Performed by: NURSE PRACTITIONER

## 2025-04-29 PROCEDURE — 3074F SYST BP LT 130 MM HG: CPT | Performed by: NURSE PRACTITIONER

## 2025-04-29 PROCEDURE — 1159F MED LIST DOCD IN RCRD: CPT | Performed by: NURSE PRACTITIONER

## 2025-04-29 PROCEDURE — 77063 BREAST TOMOSYNTHESIS BI: CPT

## 2025-04-29 PROCEDURE — G8427 DOCREV CUR MEDS BY ELIG CLIN: HCPCS | Performed by: NURSE PRACTITIONER

## 2025-04-29 ASSESSMENT — ENCOUNTER SYMPTOMS
STRIDOR: 0
BLOOD IN STOOL: 0
RECTAL PAIN: 0

## 2025-05-27 DIAGNOSIS — I10 ESSENTIAL HYPERTENSION: ICD-10-CM

## 2025-05-27 DIAGNOSIS — E03.9 HYPOTHYROIDISM, UNSPECIFIED TYPE: ICD-10-CM

## 2025-05-27 DIAGNOSIS — E78.2 MIXED HYPERLIPIDEMIA: ICD-10-CM

## 2025-05-28 RX ORDER — LEVOTHYROXINE SODIUM 75 UG/1
75 TABLET ORAL DAILY
Qty: 90 TABLET | Refills: 1 | Status: SHIPPED | OUTPATIENT
Start: 2025-05-28 | End: 2025-05-30 | Stop reason: SDUPTHER

## 2025-05-28 RX ORDER — PRAVASTATIN SODIUM 40 MG
40 TABLET ORAL DAILY
Qty: 90 TABLET | Refills: 1 | Status: SHIPPED | OUTPATIENT
Start: 2025-05-28 | End: 2025-05-30 | Stop reason: SDUPTHER

## 2025-05-28 RX ORDER — METOPROLOL SUCCINATE 100 MG/1
100 TABLET, EXTENDED RELEASE ORAL DAILY
Qty: 90 TABLET | Refills: 1 | Status: SHIPPED | OUTPATIENT
Start: 2025-05-28 | End: 2025-05-30 | Stop reason: SDUPTHER

## 2025-05-28 RX ORDER — LISINOPRIL 40 MG/1
40 TABLET ORAL DAILY
Qty: 90 TABLET | Refills: 1 | Status: SHIPPED | OUTPATIENT
Start: 2025-05-28 | End: 2025-05-30 | Stop reason: SDUPTHER

## 2025-05-28 RX ORDER — AMLODIPINE BESYLATE 5 MG/1
5 TABLET ORAL DAILY
Qty: 90 TABLET | Refills: 1 | Status: SHIPPED | OUTPATIENT
Start: 2025-05-28 | End: 2025-05-30 | Stop reason: SDUPTHER

## 2025-05-28 NOTE — TELEPHONE ENCOUNTER
Last Appointment:  2/7/2025  Future Appointments   Date Time Provider Department Center   6/13/2025  2:00 PM Med Chavarria MD [facility]   11/18/2025  1:30 PM [facility]   2/13/2026 10:00 AM Esperanza Joel DO [facility]   4/29/2026 10:15 AM [facility] RM 1 [facility]   4/29/2026 11:00 AM Juju Pickering, APRN - CNP [facility]

## 2025-05-30 DIAGNOSIS — I10 ESSENTIAL HYPERTENSION: ICD-10-CM

## 2025-05-30 DIAGNOSIS — E78.2 MIXED HYPERLIPIDEMIA: ICD-10-CM

## 2025-05-30 DIAGNOSIS — E03.9 HYPOTHYROIDISM, UNSPECIFIED TYPE: ICD-10-CM

## 2025-05-30 RX ORDER — METOPROLOL SUCCINATE 100 MG/1
100 TABLET, EXTENDED RELEASE ORAL DAILY
Qty: 90 TABLET | Refills: 1 | Status: SHIPPED | OUTPATIENT
Start: 2025-05-30

## 2025-05-30 RX ORDER — AMLODIPINE BESYLATE 5 MG/1
5 TABLET ORAL DAILY
Qty: 90 TABLET | Refills: 1 | Status: SHIPPED | OUTPATIENT
Start: 2025-05-30 | End: 2025-11-26

## 2025-05-30 RX ORDER — PRAVASTATIN SODIUM 40 MG
40 TABLET ORAL DAILY
Qty: 90 TABLET | Refills: 1 | Status: SHIPPED | OUTPATIENT
Start: 2025-05-30

## 2025-05-30 RX ORDER — LEVOTHYROXINE SODIUM 75 UG/1
75 TABLET ORAL DAILY
Qty: 90 TABLET | Refills: 1 | Status: SHIPPED | OUTPATIENT
Start: 2025-05-30

## 2025-05-30 RX ORDER — LISINOPRIL 40 MG/1
40 TABLET ORAL DAILY
Qty: 90 TABLET | Refills: 1 | Status: SHIPPED | OUTPATIENT
Start: 2025-05-30

## 2025-05-30 NOTE — TELEPHONE ENCOUNTER
Last Appointment:  2/7/2025  Future Appointments   Date Time Provider Department Center   6/13/2025  2:00 PM Med Chavarria MD [facility]   11/18/2025  1:30 PM [facility]   2/13/2026 10:00 AM Esperanza Joel DO [facility] DEP   4/29/2026 10:15 AM [facility] RM 1 [facility]   4/29/2026 11:00 AM Juju Pickering, APRN - CNP [facility]

## 2025-06-13 ENCOUNTER — OFFICE VISIT (OUTPATIENT)
Dept: CARDIOLOGY CLINIC | Age: 65
End: 2025-06-13
Payer: MEDICARE

## 2025-06-13 VITALS
SYSTOLIC BLOOD PRESSURE: 124 MMHG | OXYGEN SATURATION: 94 % | RESPIRATION RATE: 18 BRPM | BODY MASS INDEX: 50.46 KG/M2 | TEMPERATURE: 97.5 F | HEIGHT: 63 IN | HEART RATE: 66 BPM | DIASTOLIC BLOOD PRESSURE: 80 MMHG | WEIGHT: 284.8 LBS

## 2025-06-13 DIAGNOSIS — I10 ESSENTIAL HYPERTENSION: ICD-10-CM

## 2025-06-13 DIAGNOSIS — I10 PRIMARY HYPERTENSION: ICD-10-CM

## 2025-06-13 DIAGNOSIS — G47.33 OSA (OBSTRUCTIVE SLEEP APNEA): ICD-10-CM

## 2025-06-13 DIAGNOSIS — I50.9 ACUTE DECOMPENSATED HEART FAILURE (HCC): Primary | ICD-10-CM

## 2025-06-13 DIAGNOSIS — E11.65 TYPE 2 DIABETES MELLITUS WITH HYPERGLYCEMIA, WITHOUT LONG-TERM CURRENT USE OF INSULIN (HCC): ICD-10-CM

## 2025-06-13 PROCEDURE — G8417 CALC BMI ABV UP PARAM F/U: HCPCS | Performed by: INTERNAL MEDICINE

## 2025-06-13 PROCEDURE — 1036F TOBACCO NON-USER: CPT | Performed by: INTERNAL MEDICINE

## 2025-06-13 PROCEDURE — 1090F PRES/ABSN URINE INCON ASSESS: CPT | Performed by: INTERNAL MEDICINE

## 2025-06-13 PROCEDURE — 2022F DILAT RTA XM EVC RTNOPTHY: CPT | Performed by: INTERNAL MEDICINE

## 2025-06-13 PROCEDURE — G8427 DOCREV CUR MEDS BY ELIG CLIN: HCPCS | Performed by: INTERNAL MEDICINE

## 2025-06-13 PROCEDURE — G2211 COMPLEX E/M VISIT ADD ON: HCPCS | Performed by: INTERNAL MEDICINE

## 2025-06-13 PROCEDURE — 1123F ACP DISCUSS/DSCN MKR DOCD: CPT | Performed by: INTERNAL MEDICINE

## 2025-06-13 PROCEDURE — 93000 ELECTROCARDIOGRAM COMPLETE: CPT | Performed by: INTERNAL MEDICINE

## 2025-06-13 PROCEDURE — 3074F SYST BP LT 130 MM HG: CPT | Performed by: INTERNAL MEDICINE

## 2025-06-13 PROCEDURE — 99214 OFFICE O/P EST MOD 30 MIN: CPT | Performed by: INTERNAL MEDICINE

## 2025-06-13 PROCEDURE — 1159F MED LIST DOCD IN RCRD: CPT | Performed by: INTERNAL MEDICINE

## 2025-06-13 PROCEDURE — 3079F DIAST BP 80-89 MM HG: CPT | Performed by: INTERNAL MEDICINE

## 2025-06-13 PROCEDURE — G8400 PT W/DXA NO RESULTS DOC: HCPCS | Performed by: INTERNAL MEDICINE

## 2025-06-13 PROCEDURE — 3017F COLORECTAL CA SCREEN DOC REV: CPT | Performed by: INTERNAL MEDICINE

## 2025-06-13 PROCEDURE — 3044F HG A1C LEVEL LT 7.0%: CPT | Performed by: INTERNAL MEDICINE

## 2025-06-13 RX ORDER — TORSEMIDE 20 MG/1
20 TABLET ORAL DAILY
Qty: 30 TABLET | Refills: 3 | Status: SHIPPED | OUTPATIENT
Start: 2025-06-13

## 2025-06-13 NOTE — PROGRESS NOTES
Tirzepatide (MOUNJARO) 2.5 MG/0.5ML SOAJ pen      5. Essential hypertension        6. Body mass index [BMI] 50.0-59.9, adult (Z68.43)             Plan:  1.  Palpitations: Symptoms doing better overall.  Echocardiogram as above.  Holter monitor with no concerning findings.  Majority of symptoms associated with sinus rhythm.  Some symptoms associated with sinus and rare PVCs.  Continue metoprolol at 100 mg daily  2.  Heart failure preserved ejection fraction: Hypervolemic on examination.  Has been using Lasix on a daily basis with good response.  Remains hypervolemic.  Switch to Demadex 20 mg daily.  Heart failure education as well as salt restriction counseling provided.  Echocardiogram shows preserved biventricular systolic function.  Indeterminate diastolic function.  No significant valvular disease.  Unable to estimate PA pressure   3.  Morbid obesity: On Trulicity.  Not much change in terms of weight.  Agreeable for switch to Mounjaro.  Start 2.5 mg weekly.  Uptitrated every 4 weeks once prior authorization goes through.  Dietary and lifestyle modification counseling provided.  4.  Hypertension: Pressure is at goal in the office today.  On Toprol-XL as above.  On lisinopril 40 mg daily, initiating diuretics as above.  Also on Norvasc 5 mg daily  5.  Hyperlipidemia: Continue pravastatin 40 mg daily.    Follow-up with me in 6 months      Med Chavarria MD  [facility]     NOTE: This report was transcribed using voice recognition software. Every effort was made to ensure accuracy; however, inadvertent computerized transcription errors may be present.

## 2025-06-13 NOTE — PATIENT INSTRUCTIONS
Continue all your medications at current doses.   Stop trulicity. Stop lasix. Start demadex.   Start Mounjaro 2.5 mg weekly. Dose to be increased every 4 weeks.   Restrict sodium intake to less than 2-2.5 g/day. Restrict fluid intake to less than 2.2 L/day. Goal BP is less than 130/80.   If your weight increases by > 2 lbs in a day or >5 lbs in more than a day, take an extra lasix pill.   Please try to exercise for 150 minutes a week.   I will see you back in the office in 12 months. Please call the office at [phone] [phone] for [place]) if you have any questions.

## 2025-07-14 DIAGNOSIS — E78.2 MIXED HYPERLIPIDEMIA: ICD-10-CM

## 2025-07-14 DIAGNOSIS — E11.65 TYPE 2 DIABETES MELLITUS WITH HYPERGLYCEMIA, WITHOUT LONG-TERM CURRENT USE OF INSULIN (HCC): ICD-10-CM

## 2025-07-14 RX ORDER — TIRZEPATIDE 5 MG/.5ML
0.5 INJECTION, SOLUTION SUBCUTANEOUS WEEKLY
Qty: 0.5 ML | Refills: 0 | Status: CANCELLED | OUTPATIENT
Start: 2025-07-14

## 2025-07-14 RX ORDER — TIRZEPATIDE 7.5 MG/.5ML
0.5 INJECTION, SOLUTION SUBCUTANEOUS WEEKLY
COMMUNITY
End: 2025-07-14 | Stop reason: SDUPTHER

## 2025-07-14 RX ORDER — TIRZEPATIDE 5 MG/.5ML
0.5 INJECTION, SOLUTION SUBCUTANEOUS WEEKLY
COMMUNITY
End: 2025-07-14

## 2025-07-14 RX ORDER — TIRZEPATIDE 7.5 MG/.5ML
0.5 INJECTION, SOLUTION SUBCUTANEOUS WEEKLY
Qty: 0.5 ML | Refills: 0 | Status: SHIPPED | OUTPATIENT
Start: 2025-07-14

## 2025-08-13 DIAGNOSIS — I10 ESSENTIAL HYPERTENSION: ICD-10-CM

## 2025-08-14 ENCOUNTER — TELEPHONE (OUTPATIENT)
Dept: CARDIOLOGY CLINIC | Age: 65
End: 2025-08-14

## (undated) DEVICE — PUMP SUC IRR TBNG L10FT W/ HNDPC ASSEMB STRYKEFLOW 2

## (undated) DEVICE — PATIENT RETURN ELECTRODE, SINGLE-USE, CONTACT QUALITY MONITORING, ADULT, WITH 9FT CORD, FOR PATIENTS WEIGING OVER 33LBS. (15KG): Brand: MEGADYNE

## (undated) DEVICE — TROCAR: Brand: KII SLEEVE

## (undated) DEVICE — MARKER,SKIN,WI/RULER AND LABELS: Brand: MEDLINE

## (undated) DEVICE — SYRINGE MED 10ML TRNSLUC BRL PLUNG BLK MRK POLYPR CTRL

## (undated) DEVICE — DOUBLE BASIN SET: Brand: MEDLINE INDUSTRIES, INC.

## (undated) DEVICE — APPLICATOR MEDICATED 10.5 CC SOLUTION HI LT ORNG CHLORAPREP

## (undated) DEVICE — NEEDLE HYPO 25GA L1.5IN BLU POLYPR HUB S STL REG BVL STR

## (undated) DEVICE — 6 ML SYRINGE LUER-LOCK TIP: Brand: MONOJECT

## (undated) DEVICE — SKIN AFFIX SURG ADHESIVE 72/CS 0.55ML: Brand: MEDLINE

## (undated) DEVICE — DRAPE 64X41IN RADIOLOGY C ARM EQUIP STER

## (undated) DEVICE — APPLIER CLP M L L11.4IN DIA10MM ENDOSCP ROT MULT FOR LIG

## (undated) DEVICE — PROGRASP FORCEPS: Brand: ENDOWRIST

## (undated) DEVICE — 3M™ RED DOT™ MONITORING ELECTRODE WITH FOAM TAPE AND STICKY GEL 2560, 50/BAG, 20/CASE, 72/PLT: Brand: RED DOT™

## (undated) DEVICE — 20 ML SYRINGE REGULAR TIP: Brand: MONOJECT

## (undated) DEVICE — Z INACTIVE USE 2660664 SOLUTION IRRIG 3000ML 0.9% SOD CHL USP UROMATIC PLAS CONT

## (undated) DEVICE — BLADELESS OBTURATOR: Brand: WECK VISTA

## (undated) DEVICE — SYRINGE, LUER LOCK, 10ML: Brand: MEDLINE

## (undated) DEVICE — SET ENDO INSTR RED YEL LAPAROSCOPIC

## (undated) DEVICE — SYRINGE MED 20ML STD CLR PLAS LUERLOCK TIP N CTRL DISP

## (undated) DEVICE — PMI PTFE COATED LAPAROSCOPIC WIRE L-HOOK 33 CM: Brand: PMI

## (undated) DEVICE — SET ENDO INSTR LAPAROSCOPIC INCISIONAL

## (undated) DEVICE — Z INACTIVE USE 2641839 CLIP INT M L POLYMER LOK LIG HEM O LOK

## (undated) DEVICE — GAUZE,SPONGE,4"X4",12PLY,STERILE,LF,2'S: Brand: MEDLINE

## (undated) DEVICE — TUBING, SUCTION, 1/4" X 10', STRAIGHT: Brand: MEDLINE

## (undated) DEVICE — Device: Brand: INSTRUSAFE

## (undated) DEVICE — TOWEL,OR,DSP,ST,BLUE,STD,6/PK,12PK/CS: Brand: MEDLINE

## (undated) DEVICE — CANNULA SEAL

## (undated) DEVICE — COUNTER NDL 10 COUNT HLD 20 FOAM BLK SGL MAG

## (undated) DEVICE — TOWEL OR BLUEE 16X26IN ST 8 PACK ORB08 16X26ORTWL

## (undated) DEVICE — GLOVE ORANGE PI 7   MSG9070

## (undated) DEVICE — [HIGH FLOW INSUFFLATOR,  DO NOT USE IF PACKAGE IS DAMAGED,  KEEP DRY,  KEEP AWAY FROM SUNLIGHT,  PROTECT FROM HEAT AND RADIOACTIVE SOURCES.]: Brand: PNEUMOSURE

## (undated) DEVICE — GOWN,SIRUS,NONRNF,SETINSLV,XL,20/CS: Brand: MEDLINE

## (undated) DEVICE — TROCAR: Brand: KII FIOS FIRST ENTRY

## (undated) DEVICE — LAPAROSCOPIC SCISSORS: Brand: EPIX LAPAROSCOPIC SCISSORS

## (undated) DEVICE — SCISSORS SURG DIA8MM MPLR CRV ENDOWRIST

## (undated) DEVICE — TIP COVER ACCESSORY

## (undated) DEVICE — NON-DEHP CATHETER EXTENSION SET, MALE LUER LOCK ADAPTER

## (undated) DEVICE — 22GX5" WHITACRE SPINAL NEEDLE: Brand: MEDLINE

## (undated) DEVICE — SHEET,DRAPE,40X58,STERILE: Brand: MEDLINE

## (undated) DEVICE — APPLICATOR MEDICATED 26 CC SOLUTION HI LT ORNG CHLORAPREP

## (undated) DEVICE — NEEDLE HYPO 18GA L1.5IN PNK POLYPR HUB S STL THN WALL FILL

## (undated) DEVICE — PLUMEPORT LAPAROSCOPIC SMOKE FILTRATION DEVICE: Brand: PLUMEPORT ACTIV

## (undated) DEVICE — MEDIA CONTRAST ISOVUE GLASS VIALS 250 50ML

## (undated) DEVICE — GLOVE ORANGE PI 7 1/2   MSG9075

## (undated) DEVICE — CHLORAPREP 26ML ORANGE

## (undated) DEVICE — CAMERA STRYKER 1488 HD GEN

## (undated) DEVICE — 12 ML SYRINGE,LUER-LOCK TIP: Brand: MONOJECT

## (undated) DEVICE — PMI PTFE COATED LAPAROSCOPIC WIRE L-HOOK 44 CM: Brand: PMI

## (undated) DEVICE — WARMER SCP LAP

## (undated) DEVICE — SET INST DAVINCI XI ACCESSORIES

## (undated) DEVICE — COLUMN DRAPE

## (undated) DEVICE — ARM DRAPE

## (undated) DEVICE — SCOPE DAVINCI XI 30 DEG W/CORD

## (undated) DEVICE — ELECTRODE PT RET AD L9FT HI MOIST COND ADH HYDRGEL CORDED

## (undated) DEVICE — GARMENT,MEDLINE,DVT,INT,CALF,MED, GEN2: Brand: MEDLINE

## (undated) DEVICE — COVER,LIGHT HANDLE,FLX,1/PK: Brand: MEDLINE INDUSTRIES, INC.

## (undated) DEVICE — MICRO TIP WIPE: Brand: DEVON

## (undated) DEVICE — COOK ENDOSCOPIC CHOLANGIOGRAPHY SET: Brand: COOK

## (undated) DEVICE — PACK SURG LAP CHOLE CUSTOM

## (undated) DEVICE — NEEDLE, QUINCKE, 22GX5": Brand: MEDLINE

## (undated) DEVICE — Z DISCONTINUED NO SUB IDED BAG SPEC RETRV M C240ML MOUTH 7.3MM L17CM SHFT 10MM NYL EZEE

## (undated) DEVICE — MEDIUM-LARGE CLIP APPLIER: Brand: ENDOWRIST

## (undated) DEVICE — 3 ML SYRINGE LUER-LOCK TIP: Brand: MONOJECT

## (undated) DEVICE — INSUFFLATION NEEDLE TO ESTABLISH PNEUMOPERITONEUM.: Brand: INSUFFLATION NEEDLE

## (undated) DEVICE — BANDAGE ADH W1XL3IN NAT FAB WVN FLX DURABLE N ADH PD SEAL

## (undated) DEVICE — ELECTRO LUBE IS A SINGLE PATIENT USE DEVICE THAT IS INTENDED TO BE USED ON ELECTROSURGICAL ELECTRODES TO REDUCE STICKING.: Brand: KEY SURGICAL ELECTRO LUBE